# Patient Record
Sex: FEMALE | Race: WHITE | NOT HISPANIC OR LATINO
[De-identification: names, ages, dates, MRNs, and addresses within clinical notes are randomized per-mention and may not be internally consistent; named-entity substitution may affect disease eponyms.]

---

## 2017-02-27 ENCOUNTER — APPOINTMENT (OUTPATIENT)
Age: 47
End: 2017-02-27

## 2017-02-27 VITALS
SYSTOLIC BLOOD PRESSURE: 140 MMHG | DIASTOLIC BLOOD PRESSURE: 94 MMHG | RESPIRATION RATE: 14 BRPM | TEMPERATURE: 97.3 F | BODY MASS INDEX: 38.61 KG/M2 | HEIGHT: 67 IN | HEART RATE: 60 BPM | WEIGHT: 246 LBS

## 2017-02-27 LAB
ALBUMIN SERPL ELPH-MCNC: 3.2 G/DL
ALP BLD-CCNC: 36 U/L
ALT SERPL-CCNC: 17 U/L
ANION GAP SERPL CALC-SCNC: 17 MMOL/L
AST SERPL-CCNC: 20 U/L
BASOPHILS # BLD AUTO: 0.03 K/UL
BASOPHILS NFR BLD AUTO: 0.4 %
BILIRUB SERPL-MCNC: 0.4 MG/DL
BUN SERPL-MCNC: 26 MG/DL
CALCIUM SERPL-MCNC: 9.4 MG/DL
CHLORIDE SERPL-SCNC: 105 MMOL/L
CO2 SERPL-SCNC: 20 MMOL/L
CREAT SERPL-MCNC: 1.7 MG/DL
EOSINOPHIL # BLD AUTO: 0.45 K/UL
EOSINOPHIL NFR BLD AUTO: 5.5 %
HCT VFR BLD CALC: 38.5 %
HGB BLD-MCNC: 12.3 G/DL
IMM GRANULOCYTES NFR BLD AUTO: 0.2 %
LYMPHOCYTES # BLD AUTO: 2.22 K/UL
LYMPHOCYTES NFR BLD AUTO: 27 %
MAN DIFF?: NORMAL
MCHC RBC-ENTMCNC: 29.9 PG
MCHC RBC-ENTMCNC: 31.9 GM/DL
MCV RBC AUTO: 93.7 FL
MONOCYTES # BLD AUTO: 0.5 K/UL
MONOCYTES NFR BLD AUTO: 6.1 %
NEUTROPHILS # BLD AUTO: 5 K/UL
NEUTROPHILS NFR BLD AUTO: 60.8 %
PLATELET # BLD AUTO: 274 K/UL
POTASSIUM SERPL-SCNC: 4.6 MMOL/L
PROT SERPL-MCNC: 6 G/DL
RBC # BLD: 4.11 M/UL
RBC # FLD: 13.4 %
SODIUM SERPL-SCNC: 142 MMOL/L
WBC # FLD AUTO: 8.22 K/UL

## 2017-02-28 LAB
AFP-TM SERPL-MCNC: 2.5 NG/ML
HBV SURFACE AB SER QL: NONREACTIVE
HBV SURFACE AG SER QL: REACTIVE
HCV AB SER QL: NONREACTIVE
HCV S/CO RATIO: 0.18 S/CO

## 2017-03-01 LAB
HAV IGG+IGM SER QL: REACTIVE
HBV DNA # SERPL NAA+PROBE: NOT DETECTED
HBV E AB SER QL: POSITIVE
HBV E AG SER QL: NEGATIVE
HEPB DNA PCR LOG: NOT DETECTED LOGIU/ML

## 2017-03-03 LAB — HEV AB SER QL: NEGATIVE

## 2017-03-13 ENCOUNTER — APPOINTMENT (OUTPATIENT)
Age: 47
End: 2017-03-13

## 2017-03-15 ENCOUNTER — TRANSCRIPTION ENCOUNTER (OUTPATIENT)
Age: 47
End: 2017-03-15

## 2017-03-22 ENCOUNTER — FORM ENCOUNTER (OUTPATIENT)
Age: 47
End: 2017-03-22

## 2017-03-23 ENCOUNTER — APPOINTMENT (OUTPATIENT)
Dept: ULTRASOUND IMAGING | Facility: IMAGING CENTER | Age: 47
End: 2017-03-23

## 2017-03-23 ENCOUNTER — OUTPATIENT (OUTPATIENT)
Dept: OUTPATIENT SERVICES | Facility: HOSPITAL | Age: 47
LOS: 1 days | End: 2017-03-23
Payer: COMMERCIAL

## 2017-03-23 DIAGNOSIS — B18.1 CHRONIC VIRAL HEPATITIS B WITHOUT DELTA-AGENT: ICD-10-CM

## 2017-03-23 PROCEDURE — 76700 US EXAM ABDOM COMPLETE: CPT

## 2017-04-19 ENCOUNTER — APPOINTMENT (OUTPATIENT)
Dept: RADIOLOGY | Facility: IMAGING CENTER | Age: 47
End: 2017-04-19

## 2017-04-19 ENCOUNTER — OUTPATIENT (OUTPATIENT)
Dept: OUTPATIENT SERVICES | Facility: HOSPITAL | Age: 47
LOS: 1 days | End: 2017-04-19
Payer: COMMERCIAL

## 2017-04-19 ENCOUNTER — APPOINTMENT (OUTPATIENT)
Dept: MRI IMAGING | Facility: CLINIC | Age: 47
End: 2017-04-19

## 2017-04-19 DIAGNOSIS — Z00.8 ENCOUNTER FOR OTHER GENERAL EXAMINATION: ICD-10-CM

## 2017-04-19 PROCEDURE — 73721 MRI JNT OF LWR EXTRE W/O DYE: CPT

## 2017-04-19 PROCEDURE — 77080 DXA BONE DENSITY AXIAL: CPT

## 2017-06-10 ENCOUNTER — EMERGENCY (EMERGENCY)
Facility: HOSPITAL | Age: 47
LOS: 1 days | Discharge: LEFT BEFORE DISCHARGE | End: 2017-06-10
Attending: EMERGENCY MEDICINE | Admitting: EMERGENCY MEDICINE
Payer: SELF-PAY

## 2017-06-10 VITALS
RESPIRATION RATE: 17 BRPM | DIASTOLIC BLOOD PRESSURE: 87 MMHG | HEART RATE: 76 BPM | SYSTOLIC BLOOD PRESSURE: 147 MMHG | OXYGEN SATURATION: 100 % | TEMPERATURE: 99 F

## 2017-06-10 VITALS
HEART RATE: 70 BPM | SYSTOLIC BLOOD PRESSURE: 171 MMHG | DIASTOLIC BLOOD PRESSURE: 94 MMHG | TEMPERATURE: 98 F | OXYGEN SATURATION: 100 % | RESPIRATION RATE: 20 BRPM

## 2017-06-10 DIAGNOSIS — Z91.040 LATEX ALLERGY STATUS: ICD-10-CM

## 2017-06-10 DIAGNOSIS — R51 HEADACHE: ICD-10-CM

## 2017-06-10 DIAGNOSIS — I10 ESSENTIAL (PRIMARY) HYPERTENSION: ICD-10-CM

## 2017-06-10 DIAGNOSIS — K21.9 GASTRO-ESOPHAGEAL REFLUX DISEASE WITHOUT ESOPHAGITIS: ICD-10-CM

## 2017-06-10 DIAGNOSIS — Z88.0 ALLERGY STATUS TO PENICILLIN: ICD-10-CM

## 2017-06-10 DIAGNOSIS — Z79.899 OTHER LONG TERM (CURRENT) DRUG THERAPY: ICD-10-CM

## 2017-06-10 DIAGNOSIS — R06.02 SHORTNESS OF BREATH: ICD-10-CM

## 2017-06-10 DIAGNOSIS — Z98.890 OTHER SPECIFIED POSTPROCEDURAL STATES: ICD-10-CM

## 2017-06-10 LAB
ALBUMIN SERPL ELPH-MCNC: 3 G/DL — LOW (ref 3.3–5)
ALP SERPL-CCNC: 42 U/L — SIGNIFICANT CHANGE UP (ref 40–120)
ALT FLD-CCNC: 20 U/L RC — SIGNIFICANT CHANGE UP (ref 10–45)
ANION GAP SERPL CALC-SCNC: 14 MMOL/L — SIGNIFICANT CHANGE UP (ref 5–17)
APPEARANCE UR: ABNORMAL
AST SERPL-CCNC: 12 U/L — SIGNIFICANT CHANGE UP (ref 10–40)
BACTERIA # UR AUTO: ABNORMAL /HPF
BASOPHILS # BLD AUTO: 0.1 K/UL — SIGNIFICANT CHANGE UP (ref 0–0.2)
BASOPHILS NFR BLD AUTO: 0.6 % — SIGNIFICANT CHANGE UP (ref 0–2)
BILIRUB SERPL-MCNC: 0.1 MG/DL — LOW (ref 0.2–1.2)
BILIRUB UR-MCNC: NEGATIVE — SIGNIFICANT CHANGE UP
BUN SERPL-MCNC: 36 MG/DL — HIGH (ref 7–23)
CALCIUM SERPL-MCNC: 8.8 MG/DL — SIGNIFICANT CHANGE UP (ref 8.4–10.5)
CHLORIDE SERPL-SCNC: 106 MMOL/L — SIGNIFICANT CHANGE UP (ref 96–108)
CO2 SERPL-SCNC: 23 MMOL/L — SIGNIFICANT CHANGE UP (ref 22–31)
COLOR SPEC: SIGNIFICANT CHANGE UP
CREAT SERPL-MCNC: 2.1 MG/DL — HIGH (ref 0.5–1.3)
DIFF PNL FLD: ABNORMAL
EOSINOPHIL # BLD AUTO: 0.5 K/UL — SIGNIFICANT CHANGE UP (ref 0–0.5)
EOSINOPHIL NFR BLD AUTO: 4.7 % — SIGNIFICANT CHANGE UP (ref 0–6)
EPI CELLS # UR: SIGNIFICANT CHANGE UP /HPF
GLUCOSE SERPL-MCNC: 90 MG/DL — SIGNIFICANT CHANGE UP (ref 70–99)
GLUCOSE UR QL: NEGATIVE — SIGNIFICANT CHANGE UP
HCT VFR BLD CALC: 34.1 % — LOW (ref 34.5–45)
HGB BLD-MCNC: 11.7 G/DL — SIGNIFICANT CHANGE UP (ref 11.5–15.5)
KETONES UR-MCNC: NEGATIVE — SIGNIFICANT CHANGE UP
LEUKOCYTE ESTERASE UR-ACNC: NEGATIVE — SIGNIFICANT CHANGE UP
LYMPHOCYTES # BLD AUTO: 28.6 % — SIGNIFICANT CHANGE UP (ref 13–44)
LYMPHOCYTES # BLD AUTO: 3 K/UL — SIGNIFICANT CHANGE UP (ref 1–3.3)
MCHC RBC-ENTMCNC: 31.8 PG — SIGNIFICANT CHANGE UP (ref 27–34)
MCHC RBC-ENTMCNC: 34.3 GM/DL — SIGNIFICANT CHANGE UP (ref 32–36)
MCV RBC AUTO: 92.7 FL — SIGNIFICANT CHANGE UP (ref 80–100)
MONOCYTES # BLD AUTO: 0.8 K/UL — SIGNIFICANT CHANGE UP (ref 0–0.9)
MONOCYTES NFR BLD AUTO: 7.4 % — SIGNIFICANT CHANGE UP (ref 2–14)
NEUTROPHILS # BLD AUTO: 6.3 K/UL — SIGNIFICANT CHANGE UP (ref 1.8–7.4)
NEUTROPHILS NFR BLD AUTO: 58.7 % — SIGNIFICANT CHANGE UP (ref 43–77)
NITRITE UR-MCNC: NEGATIVE — SIGNIFICANT CHANGE UP
PH UR: 6 — SIGNIFICANT CHANGE UP (ref 5–8)
PLATELET # BLD AUTO: 287 K/UL — SIGNIFICANT CHANGE UP (ref 150–400)
POTASSIUM SERPL-MCNC: 3.9 MMOL/L — SIGNIFICANT CHANGE UP (ref 3.5–5.3)
POTASSIUM SERPL-SCNC: 3.9 MMOL/L — SIGNIFICANT CHANGE UP (ref 3.5–5.3)
PROT SERPL-MCNC: 5.5 G/DL — LOW (ref 6–8.3)
PROT UR-MCNC: >600 MG/DL
RBC # BLD: 3.68 M/UL — LOW (ref 3.8–5.2)
RBC # FLD: 11.2 % — SIGNIFICANT CHANGE UP (ref 10.3–14.5)
RBC CASTS # UR COMP ASSIST: ABNORMAL /HPF (ref 0–2)
SODIUM SERPL-SCNC: 143 MMOL/L — SIGNIFICANT CHANGE UP (ref 135–145)
SP GR SPEC: 1.01 — SIGNIFICANT CHANGE UP (ref 1.01–1.02)
UROBILINOGEN FLD QL: NEGATIVE — SIGNIFICANT CHANGE UP
WBC # BLD: 10.7 K/UL — HIGH (ref 3.8–10.5)
WBC # FLD AUTO: 10.7 K/UL — HIGH (ref 3.8–10.5)
WBC UR QL: ABNORMAL /HPF (ref 0–5)

## 2017-06-10 PROCEDURE — 81001 URINALYSIS AUTO W/SCOPE: CPT

## 2017-06-10 PROCEDURE — 85027 COMPLETE CBC AUTOMATED: CPT

## 2017-06-10 PROCEDURE — 80053 COMPREHEN METABOLIC PANEL: CPT

## 2017-06-10 PROCEDURE — 93005 ELECTROCARDIOGRAM TRACING: CPT

## 2017-06-10 PROCEDURE — 99284 EMERGENCY DEPT VISIT MOD MDM: CPT | Mod: 25

## 2017-06-10 PROCEDURE — 93010 ELECTROCARDIOGRAM REPORT: CPT

## 2017-06-10 PROCEDURE — 99283 EMERGENCY DEPT VISIT LOW MDM: CPT | Mod: 25

## 2017-06-10 NOTE — ED ADULT NURSE REASSESSMENT NOTE - NS ED NURSE REASSESS COMMENT FT1
Pt states that she feels as if her headache is fully gone, she is ambulating to the bathroom with a steady gait.
Pts VS stable pt denies any complaints at this time. Pt signed out MD OZIEL discussed the risks associated with leave, spouse at bedside.

## 2017-06-10 NOTE — ED PROVIDER NOTE - MEDICAL DECISION MAKING DETAILS
46 y old f morbidly obese with history of hypertension ,Nephrotic syndrome came in with headache ,SOB and headache since yesterday ,Will obtain blood work ecg ct scan othe head and on reevaluation: ZR

## 2017-06-10 NOTE — ED ADULT NURSE NOTE - OBJECTIVE STATEMENT
Pt is a 45 yo female with the co HTN at home associated with headaches x 2 days. Pt states that she took an extra dose of her BP medication and drank lemon use hoping the BP would come down. Pt states her BP at home was 180/100s, she is concerned because she is supposed to be flying Pt is a 45 yo female with the co HTN at home associated with headaches x 2 days. Pt states that she took an extra dose of her BP medication and drank lemon use hoping the BP would come down. Pt reports she feels like she is having trouble taking a full breath. She denies any CP. Pt states her BP at home was 180/100s, she is concerned because she is supposed to be flying on vacation tomorrow morning. Pt denies any N/V/D no cough fever or chills. Pt has a pmh of HTN and nephrotic syndrome.

## 2017-06-10 NOTE — ED PROVIDER NOTE - OBJECTIVE STATEMENT
46 y old [place] citizen speaking f hs of hypertension ,nephrotic syndrome diagnosed 7 y ago ,Sp hysterectomy for endometriosis ,and cholecystectomy came in complains of headache and elevated /100 Since yesterday ,She is flying tomorrow morning on vacation and wants to have some BP meds more when she gets ,Takes Losartan ,Lasix She also complains of SOB since yesterday ,no chest pain 46 y old [place] speaking f hs of hypertension ,nephrotic syndrome diagnosed 7 y ago ,Sp hysterectomy for endometriosis ,and cholecystectomy came in complains of headache and elevated /100 Since yesterday ,She is flying tomorrow morning on vacation and wants to have some BP meds more when she gets ,Takes Losartan ,Lasix She also complains of SOB since yesterday ,no chest pain.    Neris Oconnell, DO: Agree with above. Pt took 975 mg Tylenol at 7 PM. 50 mg in AM & 50 mg at 6 PM. Rx 50 mg q daily. Diffuse HA that came on gradually consistent with previous episodes of HA. +photophobia.

## 2017-06-10 NOTE — ED ADULT NURSE NOTE - PMH
Chronic Hepatitis B    Dyslipidemia    Endometriosis    GERD (gastroesophageal reflux disease)    History of Nephrotic Syndrome    HTN (hypertension)    Latex allergy    Membranous Glomerulonephropathy    Obesity, Class III, BMI 40-49.9 (morbid obesity)

## 2017-06-10 NOTE — ED PROVIDER NOTE - PROGRESS NOTE DETAILS
Neris Oconnell DO: Pt's BP now 139/88 & HA improved. Neris Oconnell DO: The patient is leaving against medical advice. The patient has the capacity to refuse further medical evaluation and care. I had a lengthy discussion with the patient with  at bedside in which appropriate further evaluation and treatment was offered to the patient, and they declined. The patient understands that as a consequence of this decision they may be at risk for clinical deterioration including permanent disability and death and verbalized this understanding. Pt states her most recent creatinine was 2.4, so Cr is improved & has hx of protein in urine given Nephrotic syndrome so UA will be unlikely to demonstrate evidence of end organ damage. Recent Hep B medications d/c ~1 week ago by nephrologist as cleared by GI & pt has had improved Cr since then but pt has been very stressed per . Clear return precautions were discussed.  The patient was urged to seek follow-up care with nephrologist - urged to call nephrologist for recommended HTN medication recommendations.

## 2017-06-10 NOTE — ED PROVIDER NOTE - CONSTITUTIONAL, MLM
normal... morbidly obese , awake, alert, oriented to person, place, time/situation and in no apparent distress.

## 2017-09-11 ENCOUNTER — APPOINTMENT (OUTPATIENT)
Age: 47
End: 2017-09-11
Payer: COMMERCIAL

## 2017-09-11 VITALS
SYSTOLIC BLOOD PRESSURE: 162 MMHG | TEMPERATURE: 98.5 F | HEIGHT: 67 IN | WEIGHT: 244 LBS | HEART RATE: 79 BPM | DIASTOLIC BLOOD PRESSURE: 117 MMHG | RESPIRATION RATE: 17 BRPM | BODY MASS INDEX: 38.3 KG/M2

## 2017-09-11 PROCEDURE — 99214 OFFICE O/P EST MOD 30 MIN: CPT

## 2017-09-11 RX ORDER — AZITHROMYCIN 250 MG/1
250 TABLET, FILM COATED ORAL
Qty: 6 | Refills: 0 | Status: DISCONTINUED | COMMUNITY
Start: 2016-10-27

## 2017-09-11 RX ORDER — ONDANSETRON 4 MG/1
4 TABLET, ORALLY DISINTEGRATING ORAL
Qty: 30 | Refills: 0 | Status: DISCONTINUED | COMMUNITY
Start: 2016-10-27

## 2017-09-11 RX ORDER — METHYLPREDNISOLONE 125 MG/2ML
125 INJECTION, POWDER, LYOPHILIZED, FOR SOLUTION INTRAMUSCULAR; INTRAVENOUS
Qty: 2 | Refills: 0 | Status: DISCONTINUED | COMMUNITY
Start: 2017-04-10

## 2017-09-12 ENCOUNTER — OTHER (OUTPATIENT)
Age: 47
End: 2017-09-12

## 2017-09-12 LAB
ALBUMIN SERPL ELPH-MCNC: 3.3 G/DL
ALP BLD-CCNC: 38 U/L
ALT SERPL-CCNC: 16 U/L
ANION GAP SERPL CALC-SCNC: 13 MMOL/L
AST SERPL-CCNC: 17 U/L
BILIRUB SERPL-MCNC: 0.2 MG/DL
BUN SERPL-MCNC: 32 MG/DL
CALCIUM SERPL-MCNC: 9.7 MG/DL
CHLORIDE SERPL-SCNC: 106 MMOL/L
CO2 SERPL-SCNC: 21 MMOL/L
CREAT SERPL-MCNC: 2.59 MG/DL
HBV SURFACE AB SER QL: NONREACTIVE
HBV SURFACE AG SER QL: REACTIVE
POTASSIUM SERPL-SCNC: 4 MMOL/L
PROT SERPL-MCNC: 6.2 G/DL
SODIUM SERPL-SCNC: 140 MMOL/L

## 2017-09-13 LAB
HBV DNA # SERPL NAA+PROBE: 189 IU/ML
HEPB DNA PCR LOG: 2.28 LOGIU/ML

## 2017-09-14 ENCOUNTER — OTHER (OUTPATIENT)
Age: 47
End: 2017-09-14

## 2017-09-25 ENCOUNTER — FORM ENCOUNTER (OUTPATIENT)
Age: 47
End: 2017-09-25

## 2017-09-26 ENCOUNTER — OUTPATIENT (OUTPATIENT)
Dept: OUTPATIENT SERVICES | Facility: HOSPITAL | Age: 47
LOS: 1 days | End: 2017-09-26
Payer: COMMERCIAL

## 2017-09-26 ENCOUNTER — APPOINTMENT (OUTPATIENT)
Dept: ULTRASOUND IMAGING | Facility: IMAGING CENTER | Age: 47
End: 2017-09-26
Payer: COMMERCIAL

## 2017-09-26 DIAGNOSIS — Z00.8 ENCOUNTER FOR OTHER GENERAL EXAMINATION: ICD-10-CM

## 2017-09-26 DIAGNOSIS — B18.1 CHRONIC VIRAL HEPATITIS B WITHOUT DELTA-AGENT: ICD-10-CM

## 2017-09-26 PROCEDURE — 76700 US EXAM ABDOM COMPLETE: CPT | Mod: 26

## 2017-09-26 PROCEDURE — 76700 US EXAM ABDOM COMPLETE: CPT

## 2017-10-24 ENCOUNTER — OTHER (OUTPATIENT)
Age: 47
End: 2017-10-24

## 2017-11-08 ENCOUNTER — EMERGENCY (EMERGENCY)
Facility: HOSPITAL | Age: 47
LOS: 1 days | Discharge: HOME | End: 2017-11-08
Attending: EMERGENCY MEDICINE
Payer: SELF-PAY

## 2017-11-08 VITALS
TEMPERATURE: 98 F | RESPIRATION RATE: 18 BRPM | SYSTOLIC BLOOD PRESSURE: 141 MMHG | DIASTOLIC BLOOD PRESSURE: 88 MMHG | HEART RATE: 66 BPM | OXYGEN SATURATION: 99 %

## 2017-11-08 VITALS
HEART RATE: 74 BPM | WEIGHT: 199.96 LBS | SYSTOLIC BLOOD PRESSURE: 154 MMHG | HEIGHT: 68 IN | TEMPERATURE: 98 F | RESPIRATION RATE: 20 BRPM | OXYGEN SATURATION: 99 % | DIASTOLIC BLOOD PRESSURE: 97 MMHG

## 2017-11-08 DIAGNOSIS — S83.92XA SPRAIN OF UNSPECIFIED SITE OF LEFT KNEE, INITIAL ENCOUNTER: ICD-10-CM

## 2017-11-08 DIAGNOSIS — Z90.49 ACQUIRED ABSENCE OF OTHER SPECIFIED PARTS OF DIGESTIVE TRACT: ICD-10-CM
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
## 2017-11-08 DIAGNOSIS — S39.012A STRAIN OF MUSCLE, FASCIA AND TENDON OF LOWER BACK, INITIAL ENCOUNTER: ICD-10-CM

## 2017-11-08 DIAGNOSIS — S16.1XXA STRAIN OF MUSCLE, FASCIA AND TENDON AT NECK LEVEL, INITIAL ENCOUNTER: ICD-10-CM

## 2017-11-08 DIAGNOSIS — E78.5 HYPERLIPIDEMIA, UNSPECIFIED: ICD-10-CM

## 2017-11-08 DIAGNOSIS — Z88.0 ALLERGY STATUS TO PENICILLIN: ICD-10-CM

## 2017-11-08 DIAGNOSIS — I10 ESSENTIAL (PRIMARY) HYPERTENSION: ICD-10-CM

## 2017-11-08 DIAGNOSIS — Z91.040 LATEX ALLERGY STATUS: ICD-10-CM

## 2017-11-08 DIAGNOSIS — M54.5 LOW BACK PAIN: ICD-10-CM

## 2017-11-08 PROCEDURE — 72100 X-RAY EXAM L-S SPINE 2/3 VWS: CPT | Mod: 26

## 2017-11-08 PROCEDURE — 70450 CT HEAD/BRAIN W/O DYE: CPT | Mod: 26

## 2017-11-08 PROCEDURE — 72125 CT NECK SPINE W/O DYE: CPT | Mod: 26

## 2017-11-08 PROCEDURE — 73590 X-RAY EXAM OF LOWER LEG: CPT

## 2017-11-08 PROCEDURE — 99284 EMERGENCY DEPT VISIT MOD MDM: CPT

## 2017-11-08 PROCEDURE — 72125 CT NECK SPINE W/O DYE: CPT

## 2017-11-08 PROCEDURE — 73590 X-RAY EXAM OF LOWER LEG: CPT | Mod: 26,LT

## 2017-11-08 PROCEDURE — 99284 EMERGENCY DEPT VISIT MOD MDM: CPT | Mod: 25

## 2017-11-08 PROCEDURE — 73562 X-RAY EXAM OF KNEE 3: CPT | Mod: 26,LT

## 2017-11-08 PROCEDURE — 70450 CT HEAD/BRAIN W/O DYE: CPT

## 2017-11-08 PROCEDURE — 73562 X-RAY EXAM OF KNEE 3: CPT

## 2017-11-08 PROCEDURE — 72100 X-RAY EXAM L-S SPINE 2/3 VWS: CPT

## 2017-11-08 RX ORDER — ACETAMINOPHEN 500 MG
650 TABLET ORAL ONCE
Qty: 0 | Refills: 0 | Status: COMPLETED | OUTPATIENT
Start: 2017-11-08 | End: 2017-11-08

## 2017-11-08 RX ADMIN — Medication 650 MILLIGRAM(S): at 11:00
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
## 2017-11-08 NOTE — ED PROVIDER NOTE - CARE PLAN
Principal Discharge DX:	Knee sprain  Secondary Diagnosis:	Back strain, initial encounter  Secondary Diagnosis:	Neck strain, initial encounter
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
## 2017-11-08 NOTE — ED PROVIDER NOTE - PROGRESS NOTE DETAILS
Py and family made aware of nonemergent Ct spine findings needing f/u, immobilizer placed to left knee, educated on care and f/u pt ortho for possible need for MRI.

## 2017-12-01 ENCOUNTER — EMERGENCY (EMERGENCY)
Facility: HOSPITAL | Age: 47
LOS: 1 days | Discharge: HOME | End: 2017-12-01
Attending: EMERGENCY MEDICINE | Admitting: EMERGENCY MEDICINE
Payer: COMMERCIAL

## 2017-12-01 VITALS
DIASTOLIC BLOOD PRESSURE: 98 MMHG | HEART RATE: 72 BPM | TEMPERATURE: 98 F | SYSTOLIC BLOOD PRESSURE: 165 MMHG | OXYGEN SATURATION: 99 % | RESPIRATION RATE: 22 BRPM

## 2017-12-01 VITALS
RESPIRATION RATE: 18 BRPM | HEART RATE: 66 BPM | SYSTOLIC BLOOD PRESSURE: 143 MMHG | OXYGEN SATURATION: 98 % | DIASTOLIC BLOOD PRESSURE: 88 MMHG

## 2017-12-01 LAB
ALBUMIN SERPL ELPH-MCNC: 3.3 G/DL — SIGNIFICANT CHANGE UP (ref 3.3–5)
ALP SERPL-CCNC: 41 U/L — SIGNIFICANT CHANGE UP (ref 40–120)
ALT FLD-CCNC: 17 U/L RC — SIGNIFICANT CHANGE UP (ref 10–45)
ANION GAP SERPL CALC-SCNC: 16 MMOL/L — SIGNIFICANT CHANGE UP (ref 5–17)
APPEARANCE UR: CLEAR — SIGNIFICANT CHANGE UP
AST SERPL-CCNC: 17 U/L — SIGNIFICANT CHANGE UP (ref 10–40)
BACTERIA # UR AUTO: ABNORMAL /HPF
BASE EXCESS BLDV CALC-SCNC: -5.2 MMOL/L — LOW (ref -2–2)
BASOPHILS # BLD AUTO: 0.1 K/UL — SIGNIFICANT CHANGE UP (ref 0–0.2)
BASOPHILS NFR BLD AUTO: 0.9 % — SIGNIFICANT CHANGE UP (ref 0–2)
BILIRUB SERPL-MCNC: 0.3 MG/DL — SIGNIFICANT CHANGE UP (ref 0.2–1.2)
BILIRUB UR-MCNC: NEGATIVE — SIGNIFICANT CHANGE UP
BUN SERPL-MCNC: 30 MG/DL — HIGH (ref 7–23)
CA-I SERPL-SCNC: 1.22 MMOL/L — SIGNIFICANT CHANGE UP (ref 1.12–1.3)
CALCIUM SERPL-MCNC: 9 MG/DL — SIGNIFICANT CHANGE UP (ref 8.4–10.5)
CHLORIDE BLDV-SCNC: 112 MMOL/L — HIGH (ref 96–108)
CHLORIDE SERPL-SCNC: 104 MMOL/L — SIGNIFICANT CHANGE UP (ref 96–108)
CO2 BLDV-SCNC: 24 MMOL/L — SIGNIFICANT CHANGE UP (ref 22–30)
CO2 SERPL-SCNC: 20 MMOL/L — LOW (ref 22–31)
COLOR SPEC: COLORLESS — SIGNIFICANT CHANGE UP
CREAT SERPL-MCNC: 3.01 MG/DL — HIGH (ref 0.5–1.3)
DIFF PNL FLD: ABNORMAL
EOSINOPHIL # BLD AUTO: 0.5 K/UL — SIGNIFICANT CHANGE UP (ref 0–0.5)
EOSINOPHIL NFR BLD AUTO: 5.4 % — SIGNIFICANT CHANGE UP (ref 0–6)
EPI CELLS # UR: ABNORMAL /HPF
GAS PNL BLDV: 135 MMOL/L — LOW (ref 136–145)
GAS PNL BLDV: SIGNIFICANT CHANGE UP
GAS PNL BLDV: SIGNIFICANT CHANGE UP
GLUCOSE BLDV-MCNC: 88 MG/DL — SIGNIFICANT CHANGE UP (ref 70–99)
GLUCOSE SERPL-MCNC: 84 MG/DL — SIGNIFICANT CHANGE UP (ref 70–99)
GLUCOSE UR QL: NEGATIVE — SIGNIFICANT CHANGE UP
HCO3 BLDV-SCNC: 22 MMOL/L — SIGNIFICANT CHANGE UP (ref 21–29)
HCT VFR BLD CALC: 35.4 % — SIGNIFICANT CHANGE UP (ref 34.5–45)
HCT VFR BLDA CALC: 61 % — HIGH (ref 39–50)
HGB BLD CALC-MCNC: 20 G/DL — CRITICAL HIGH (ref 11.5–15.5)
HGB BLD-MCNC: 12.2 G/DL — SIGNIFICANT CHANGE UP (ref 11.5–15.5)
KETONES UR-MCNC: NEGATIVE — SIGNIFICANT CHANGE UP
LACTATE BLDV-MCNC: 2.3 MMOL/L — HIGH (ref 0.7–2)
LEUKOCYTE ESTERASE UR-ACNC: NEGATIVE — SIGNIFICANT CHANGE UP
LYMPHOCYTES # BLD AUTO: 2 K/UL — SIGNIFICANT CHANGE UP (ref 1–3.3)
LYMPHOCYTES # BLD AUTO: 23.1 % — SIGNIFICANT CHANGE UP (ref 13–44)
MAGNESIUM SERPL-MCNC: 1.7 MG/DL — SIGNIFICANT CHANGE UP (ref 1.6–2.6)
MCHC RBC-ENTMCNC: 31.8 PG — SIGNIFICANT CHANGE UP (ref 27–34)
MCHC RBC-ENTMCNC: 34.5 GM/DL — SIGNIFICANT CHANGE UP (ref 32–36)
MCV RBC AUTO: 92.1 FL — SIGNIFICANT CHANGE UP (ref 80–100)
MONOCYTES # BLD AUTO: 0.5 K/UL — SIGNIFICANT CHANGE UP (ref 0–0.9)
MONOCYTES NFR BLD AUTO: 6.1 % — SIGNIFICANT CHANGE UP (ref 2–14)
NEUTROPHILS # BLD AUTO: 5.5 K/UL — SIGNIFICANT CHANGE UP (ref 1.8–7.4)
NEUTROPHILS NFR BLD AUTO: 64.5 % — SIGNIFICANT CHANGE UP (ref 43–77)
NITRITE UR-MCNC: NEGATIVE — SIGNIFICANT CHANGE UP
PCO2 BLDV: 51 MMHG — HIGH (ref 35–50)
PH BLDV: 7.27 — LOW (ref 7.35–7.45)
PH UR: 6.5 — SIGNIFICANT CHANGE UP (ref 5–8)
PHOSPHATE SERPL-MCNC: 3.5 MG/DL — SIGNIFICANT CHANGE UP (ref 2.5–4.5)
PLATELET # BLD AUTO: 269 K/UL — SIGNIFICANT CHANGE UP (ref 150–400)
PO2 BLDV: 30 MMHG — SIGNIFICANT CHANGE UP (ref 25–45)
POTASSIUM BLDV-SCNC: 3.4 MMOL/L — LOW (ref 3.5–5)
POTASSIUM SERPL-MCNC: 3.8 MMOL/L — SIGNIFICANT CHANGE UP (ref 3.5–5.3)
POTASSIUM SERPL-SCNC: 3.8 MMOL/L — SIGNIFICANT CHANGE UP (ref 3.5–5.3)
PROT SERPL-MCNC: 6 G/DL — SIGNIFICANT CHANGE UP (ref 6–8.3)
PROT UR-MCNC: 300 MG/DL
RBC # BLD: 3.84 M/UL — SIGNIFICANT CHANGE UP (ref 3.8–5.2)
RBC # FLD: 11.9 % — SIGNIFICANT CHANGE UP (ref 10.3–14.5)
RBC CASTS # UR COMP ASSIST: SIGNIFICANT CHANGE UP /HPF (ref 0–2)
SAO2 % BLDV: 50 % — LOW (ref 67–88)
SODIUM SERPL-SCNC: 140 MMOL/L — SIGNIFICANT CHANGE UP (ref 135–145)
SP GR SPEC: 1.01 — LOW (ref 1.01–1.02)
UROBILINOGEN FLD QL: NEGATIVE — SIGNIFICANT CHANGE UP
WBC # BLD: 8.6 K/UL — SIGNIFICANT CHANGE UP (ref 3.8–10.5)
WBC # FLD AUTO: 8.6 K/UL — SIGNIFICANT CHANGE UP (ref 3.8–10.5)
WBC UR QL: SIGNIFICANT CHANGE UP /HPF (ref 0–5)

## 2017-12-01 PROCEDURE — 96375 TX/PRO/DX INJ NEW DRUG ADDON: CPT

## 2017-12-01 PROCEDURE — 82803 BLOOD GASES ANY COMBINATION: CPT

## 2017-12-01 PROCEDURE — 71046 X-RAY EXAM CHEST 2 VIEWS: CPT

## 2017-12-01 PROCEDURE — 84100 ASSAY OF PHOSPHORUS: CPT

## 2017-12-01 PROCEDURE — 83605 ASSAY OF LACTIC ACID: CPT

## 2017-12-01 PROCEDURE — 84295 ASSAY OF SERUM SODIUM: CPT

## 2017-12-01 PROCEDURE — 99284 EMERGENCY DEPT VISIT MOD MDM: CPT | Mod: 25

## 2017-12-01 PROCEDURE — 80053 COMPREHEN METABOLIC PANEL: CPT

## 2017-12-01 PROCEDURE — 82330 ASSAY OF CALCIUM: CPT

## 2017-12-01 PROCEDURE — 93010 ELECTROCARDIOGRAM REPORT: CPT

## 2017-12-01 PROCEDURE — 84132 ASSAY OF SERUM POTASSIUM: CPT

## 2017-12-01 PROCEDURE — 85027 COMPLETE CBC AUTOMATED: CPT

## 2017-12-01 PROCEDURE — 83735 ASSAY OF MAGNESIUM: CPT

## 2017-12-01 PROCEDURE — 93005 ELECTROCARDIOGRAM TRACING: CPT

## 2017-12-01 PROCEDURE — 85014 HEMATOCRIT: CPT

## 2017-12-01 PROCEDURE — 82435 ASSAY OF BLOOD CHLORIDE: CPT

## 2017-12-01 PROCEDURE — 80197 ASSAY OF TACROLIMUS: CPT

## 2017-12-01 PROCEDURE — 71020: CPT | Mod: 26

## 2017-12-01 PROCEDURE — 81001 URINALYSIS AUTO W/SCOPE: CPT

## 2017-12-01 PROCEDURE — 96374 THER/PROPH/DIAG INJ IV PUSH: CPT

## 2017-12-01 PROCEDURE — 82947 ASSAY GLUCOSE BLOOD QUANT: CPT

## 2017-12-01 RX ORDER — METOCLOPRAMIDE HCL 10 MG
10 TABLET ORAL ONCE
Qty: 0 | Refills: 0 | Status: COMPLETED | OUTPATIENT
Start: 2017-12-01 | End: 2017-12-01

## 2017-12-01 RX ORDER — SODIUM CHLORIDE 9 MG/ML
1000 INJECTION INTRAMUSCULAR; INTRAVENOUS; SUBCUTANEOUS ONCE
Qty: 0 | Refills: 0 | Status: DISCONTINUED | OUTPATIENT
Start: 2017-12-01 | End: 2017-12-01

## 2017-12-01 RX ORDER — ACETAMINOPHEN 500 MG
1000 TABLET ORAL ONCE
Qty: 0 | Refills: 0 | Status: COMPLETED | OUTPATIENT
Start: 2017-12-01 | End: 2017-12-01

## 2017-12-01 RX ADMIN — Medication 400 MILLIGRAM(S): at 16:42

## 2017-12-01 RX ADMIN — Medication 10 MILLIGRAM(S): at 16:42

## 2017-12-01 NOTE — ED PROVIDER NOTE - PROGRESS NOTE DETAILS
per patient last Cr 3.5 and last UA with >8000 protein. otherwise labs unremakrable attempted to page Dr. Ya twice without call back at this time patient does not want to wait, and will leave AMA. The patient is leaving against medical advice. The patient has the capacity to refuse further medical evaluation and care. I had a lengthy discussion with the patient in which appropriate further evaluation and treatment was offered to the patient as well as acceptable alternative measures, and they declined. The patient understands that as a consequence of this decision they may be at risk for clinical deterioration including fluid overload, shortness of breath, worsening renal function, permanent disability and death. The patient understands and verbalized the risks of leaving without further evaulation and treatment. Clear return precautions were discussed. The patient was urged to seek follow-up care, with appropriate referrals made as needed. -Rain DO per patient last Cr 3.5 and last UA with >8000 protein. otherwise labs unremakrable attempted to page Dr. Ya twice without call back at this time patient does not want to wait and will Follow up with her doctor early next week. Will D/C -Rain DO spoke with Dr. Guzman covering for Dr. Ya, patient already left at this time, last Cr in october was 3.1, recommended checking [place] level, which is already pending, could explain HA and HTN. will have patient Follow up this week -Rain DO

## 2017-12-01 NOTE — ED PROVIDER NOTE - CARE PLAN
Principal Discharge DX:	Dizziness  Instructions for follow-up, activity and diet:	1. Return to ED for worsening, progressive or any other concerning symptoms   2. Follow up with your primary care doctor in 2-3days  Secondary Diagnosis:	Hypertension

## 2017-12-01 NOTE — ED PROVIDER NOTE - NS ED ROS FT
ROS: no CP/SOB. no cough. no fever. no n/v/d/c. no abd pain. no rash. no bleeding. no urinary complaints. no weakness. no vision changes. +HA. no neck/back pain. +extremity swelling. No change in mental status.

## 2017-12-01 NOTE — ED PROVIDER NOTE - OBJECTIVE STATEMENT
48yo F with h/o HTN and nephrotic syndrome on lasix/tacrolimus, intermittent tension HA x1-2 weeks worse last 2-3 days associated with dizziness, no vertigo. also with paresthesias in b/l hands/feet, no weakness. no syncope. no CP/SOB. +worsening LE edema. urination normal- no change in color. no abd pain. no fevers. no vision changes. HA mild not severe. concerned due to elevated BP last 2 days and usually worse when nephrotic syndrome relapses.     Nephro- [facility] ([facility])

## 2017-12-01 NOTE — ED PROVIDER NOTE - PLAN OF CARE
1. Return to ED for worsening, progressive or any other concerning symptoms   2. Follow up with your primary care doctor in 2-3days

## 2017-12-01 NOTE — ED PROVIDER NOTE - ATTENDING CONTRIBUTION TO CARE
I performed a history and physical exam of the patient and discussed their management with the resident. I reviewed the resident's note and agree with the documented findings and plan of care.     Patient is a 46 yo F w/ hx of nephrotic syndrome, HTN, hyperlipidemia here for evaluation of high blood pressure and tingling in b/l hands and feet. Patient also complains of headache, described as frontal and bitemporal pressure. No focal weakness, fevers, chest pain, shortness of breath, urinary symptoms. Patient c/o worsening lower extremity edema. Per patient, her pressures have been very high and she associates her headache with that.   VS noted  Gen. no acute distress, Non toxic   HEENT: EOMI, mmm, PERRL  Lungs: CTAB/L no C/ W /R   CVS: RRR   Abd; Soft non tender, non distended   Ext: b/l + 1 pitting edema  Skin: no rash  Neuro AAOx3 CN 2-12 intact, strength is 5/5 in UE/LE gait normal clear speech  a/p: headache, high blood pressure, tingling in hands/ feet - BP improving without intervention, nonfocal neuro exam, patient's headache improved after treatment with reglan and tylenol during my interview. Will check labs, u/a for electrolyte abnormalities.

## 2017-12-01 NOTE — ED ADULT NURSE NOTE - OBJECTIVE STATEMENT
48 y/o female presented to the ED c/o x2 days of elevated BP, pt stated at home BP was up to 201/120. pt takes home BP medication daily. took medication today. c/o nausea, dizziness, and heart palpitations. denies chest pain or SOB. on assessment pt is A&Ox3, Neuro intact, pt c/o numbness in lips and fingers, no notation of hypoxia, no blue around lips or fingers, fingers have cap refill ,  on room air with no signs of distress, S1S2, lungs CTAB, BS +, no pain on palpation to abdomen, skin is intact, +1 edema in lower extremities. pt undressed and assessed head to toe. call bell in hand and son next to bedside.

## 2017-12-01 NOTE — ED PROVIDER NOTE - MEDICAL DECISION MAKING DETAILS
could be exacerbation of nephrotic syndrome, lungs clear not acutely fluid overload. BP elevated, has tension HA x afew weeks, no acute neuro deficits, low suspicion for ICH. will tx HA. check labs and urine. ct head if any change in exam. may need admission for nephrotic syndrome depending on labs

## 2017-12-14 ENCOUNTER — OUTPATIENT (OUTPATIENT)
Dept: OUTPATIENT SERVICES | Facility: HOSPITAL | Age: 47
LOS: 1 days | End: 2017-12-14
Payer: COMMERCIAL

## 2017-12-14 ENCOUNTER — APPOINTMENT (OUTPATIENT)
Dept: ULTRASOUND IMAGING | Facility: IMAGING CENTER | Age: 47
End: 2017-12-14
Payer: COMMERCIAL

## 2017-12-14 DIAGNOSIS — Z00.8 ENCOUNTER FOR OTHER GENERAL EXAMINATION: ICD-10-CM

## 2017-12-14 PROCEDURE — 93970 EXTREMITY STUDY: CPT | Mod: 26

## 2017-12-14 PROCEDURE — 93970 EXTREMITY STUDY: CPT

## 2018-02-14 ENCOUNTER — APPOINTMENT (OUTPATIENT)
Dept: ENDOCRINOLOGY | Facility: CLINIC | Age: 48
End: 2018-02-14

## 2018-03-12 ENCOUNTER — OTHER (OUTPATIENT)
Age: 48
End: 2018-03-12

## 2018-03-12 ENCOUNTER — APPOINTMENT (OUTPATIENT)
Age: 48
End: 2018-03-12
Payer: COMMERCIAL

## 2018-03-12 VITALS
DIASTOLIC BLOOD PRESSURE: 103 MMHG | SYSTOLIC BLOOD PRESSURE: 150 MMHG | HEIGHT: 67 IN | BODY MASS INDEX: 38.14 KG/M2 | WEIGHT: 243 LBS | TEMPERATURE: 98.2 F | RESPIRATION RATE: 17 BRPM | HEART RATE: 74 BPM

## 2018-03-12 LAB
ALBUMIN SERPL ELPH-MCNC: 3.4 G/DL
ALP BLD-CCNC: 45 U/L
ALT SERPL-CCNC: 12 U/L
ANION GAP SERPL CALC-SCNC: 15 MMOL/L
AST SERPL-CCNC: 11 U/L
BASOPHILS # BLD AUTO: 0.02 K/UL
BASOPHILS NFR BLD AUTO: 0.3 %
BILIRUB SERPL-MCNC: 0.3 MG/DL
BUN SERPL-MCNC: 44 MG/DL
CALCIUM SERPL-MCNC: 9.5 MG/DL
CHLORIDE SERPL-SCNC: 107 MMOL/L
CO2 SERPL-SCNC: 17 MMOL/L
CREAT SERPL-MCNC: 4.31 MG/DL
EOSINOPHIL # BLD AUTO: 0.38 K/UL
EOSINOPHIL NFR BLD AUTO: 4.8 %
HCT VFR BLD CALC: 31.7 %
HGB BLD-MCNC: 10.4 G/DL
IMM GRANULOCYTES NFR BLD AUTO: 0.4 %
LYMPHOCYTES # BLD AUTO: 1.56 K/UL
LYMPHOCYTES NFR BLD AUTO: 19.7 %
MAN DIFF?: NORMAL
MCHC RBC-ENTMCNC: 29.7 PG
MCHC RBC-ENTMCNC: 32.8 GM/DL
MCV RBC AUTO: 90.6 FL
MONOCYTES # BLD AUTO: 0.53 K/UL
MONOCYTES NFR BLD AUTO: 6.7 %
NEUTROPHILS # BLD AUTO: 5.39 K/UL
NEUTROPHILS NFR BLD AUTO: 68.1 %
PLATELET # BLD AUTO: 264 K/UL
POTASSIUM SERPL-SCNC: 4.1 MMOL/L
PROT SERPL-MCNC: 6.2 G/DL
RBC # BLD: 3.5 M/UL
RBC # FLD: 13.7 %
SODIUM SERPL-SCNC: 139 MMOL/L
WBC # FLD AUTO: 7.91 K/UL

## 2018-03-12 PROCEDURE — 99214 OFFICE O/P EST MOD 30 MIN: CPT

## 2018-03-12 RX ORDER — TENOFOVIR ALAFENAMIDE 25 MG/1
25 TABLET ORAL
Qty: 90 | Refills: 3 | Status: DISCONTINUED | COMMUNITY
Start: 2017-06-06 | End: 2018-03-12

## 2018-03-13 LAB
AFP-TM SERPL-MCNC: 2.6 NG/ML
HBV SURFACE AB SER QL: NONREACTIVE
HBV SURFACE AG SER QL: REACTIVE

## 2018-03-14 LAB
HBV DNA # SERPL NAA+PROBE: NOT DETECTED
HEPB DNA PCR LOG: NOT DETECTED LOGIU/ML

## 2018-03-19 ENCOUNTER — FORM ENCOUNTER (OUTPATIENT)
Age: 48
End: 2018-03-19

## 2018-03-20 ENCOUNTER — APPOINTMENT (OUTPATIENT)
Dept: ULTRASOUND IMAGING | Facility: IMAGING CENTER | Age: 48
End: 2018-03-20
Payer: COMMERCIAL

## 2018-03-20 ENCOUNTER — OUTPATIENT (OUTPATIENT)
Dept: OUTPATIENT SERVICES | Facility: HOSPITAL | Age: 48
LOS: 1 days | End: 2018-03-20
Payer: COMMERCIAL

## 2018-03-20 DIAGNOSIS — B18.1 CHRONIC VIRAL HEPATITIS B WITHOUT DELTA-AGENT: ICD-10-CM

## 2018-03-20 PROCEDURE — 76700 US EXAM ABDOM COMPLETE: CPT

## 2018-03-20 PROCEDURE — 76700 US EXAM ABDOM COMPLETE: CPT | Mod: 26

## 2018-08-11 ENCOUNTER — INPATIENT (INPATIENT)
Facility: HOSPITAL | Age: 48
LOS: 2 days | Discharge: HOME | DRG: 305 | End: 2018-08-14
Attending: INTERNAL MEDICINE | Admitting: INTERNAL MEDICINE
Payer: COMMERCIAL

## 2018-08-11 VITALS
SYSTOLIC BLOOD PRESSURE: 194 MMHG | OXYGEN SATURATION: 100 % | DIASTOLIC BLOOD PRESSURE: 104 MMHG | HEART RATE: 63 BPM | WEIGHT: 229.94 LBS | RESPIRATION RATE: 18 BRPM | TEMPERATURE: 98 F

## 2018-08-11 DIAGNOSIS — I10 ESSENTIAL (PRIMARY) HYPERTENSION: ICD-10-CM

## 2018-08-11 DIAGNOSIS — R06.09 OTHER FORMS OF DYSPNEA: ICD-10-CM

## 2018-08-11 DIAGNOSIS — N18.9 CHRONIC KIDNEY DISEASE, UNSPECIFIED: ICD-10-CM

## 2018-08-11 DIAGNOSIS — B18.1 CHRONIC VIRAL HEPATITIS B WITHOUT DELTA-AGENT: ICD-10-CM

## 2018-08-11 DIAGNOSIS — N04.9 NEPHROTIC SYNDROME WITH UNSPECIFIED MORPHOLOGIC CHANGES: ICD-10-CM

## 2018-08-11 DIAGNOSIS — R07.9 CHEST PAIN, UNSPECIFIED: ICD-10-CM

## 2018-08-11 DIAGNOSIS — I16.0 HYPERTENSIVE URGENCY: ICD-10-CM

## 2018-08-11 LAB
ALBUMIN SERPL ELPH-MCNC: 3.4 G/DL — SIGNIFICANT CHANGE UP (ref 3.3–5)
ALP SERPL-CCNC: 46 U/L — SIGNIFICANT CHANGE UP (ref 40–120)
ALT FLD-CCNC: 15 U/L — SIGNIFICANT CHANGE UP (ref 10–45)
ANION GAP SERPL CALC-SCNC: 13 MMOL/L — SIGNIFICANT CHANGE UP (ref 5–17)
APPEARANCE UR: ABNORMAL
APTT BLD: 30.4 SEC — SIGNIFICANT CHANGE UP (ref 27.5–37.4)
AST SERPL-CCNC: 15 U/L — SIGNIFICANT CHANGE UP (ref 10–40)
BACTERIA # UR AUTO: ABNORMAL /HPF
BASOPHILS # BLD AUTO: 0.1 K/UL — SIGNIFICANT CHANGE UP (ref 0–0.2)
BASOPHILS NFR BLD AUTO: 0.9 % — SIGNIFICANT CHANGE UP (ref 0–2)
BILIRUB SERPL-MCNC: 0.3 MG/DL — SIGNIFICANT CHANGE UP (ref 0.2–1.2)
BILIRUB UR-MCNC: NEGATIVE — SIGNIFICANT CHANGE UP
BUN SERPL-MCNC: 61 MG/DL — HIGH (ref 7–23)
CALCIUM SERPL-MCNC: 9.1 MG/DL — SIGNIFICANT CHANGE UP (ref 8.4–10.5)
CHLORIDE SERPL-SCNC: 107 MMOL/L — SIGNIFICANT CHANGE UP (ref 96–108)
CO2 SERPL-SCNC: 21 MMOL/L — LOW (ref 22–31)
COLOR SPEC: COLORLESS — SIGNIFICANT CHANGE UP
CREAT SERPL-MCNC: 6.13 MG/DL — HIGH (ref 0.5–1.3)
DIFF PNL FLD: ABNORMAL
EOSINOPHIL # BLD AUTO: 0.5 K/UL — SIGNIFICANT CHANGE UP (ref 0–0.5)
EOSINOPHIL NFR BLD AUTO: 7.9 % — HIGH (ref 0–6)
EPI CELLS # UR: SIGNIFICANT CHANGE UP /HPF
GLUCOSE SERPL-MCNC: 93 MG/DL — SIGNIFICANT CHANGE UP (ref 70–99)
GLUCOSE UR QL: ABNORMAL
HCT VFR BLD CALC: 27.2 % — LOW (ref 34.5–45)
HGB BLD-MCNC: 9.2 G/DL — LOW (ref 11.5–15.5)
INR BLD: 0.96 RATIO — SIGNIFICANT CHANGE UP (ref 0.88–1.16)
KETONES UR-MCNC: NEGATIVE — SIGNIFICANT CHANGE UP
LEUKOCYTE ESTERASE UR-ACNC: NEGATIVE — SIGNIFICANT CHANGE UP
LYMPHOCYTES # BLD AUTO: 1.7 K/UL — SIGNIFICANT CHANGE UP (ref 1–3.3)
LYMPHOCYTES # BLD AUTO: 24.4 % — SIGNIFICANT CHANGE UP (ref 13–44)
MCHC RBC-ENTMCNC: 30.5 PG — SIGNIFICANT CHANGE UP (ref 27–34)
MCHC RBC-ENTMCNC: 34 GM/DL — SIGNIFICANT CHANGE UP (ref 32–36)
MCV RBC AUTO: 89.7 FL — SIGNIFICANT CHANGE UP (ref 80–100)
MONOCYTES # BLD AUTO: 0.5 K/UL — SIGNIFICANT CHANGE UP (ref 0–0.9)
MONOCYTES NFR BLD AUTO: 6.9 % — SIGNIFICANT CHANGE UP (ref 2–14)
NEUTROPHILS # BLD AUTO: 4.1 K/UL — SIGNIFICANT CHANGE UP (ref 1.8–7.4)
NEUTROPHILS NFR BLD AUTO: 59.9 % — SIGNIFICANT CHANGE UP (ref 43–77)
NITRITE UR-MCNC: NEGATIVE — SIGNIFICANT CHANGE UP
PH UR: 7 — SIGNIFICANT CHANGE UP (ref 5–8)
PLATELET # BLD AUTO: 221 K/UL — SIGNIFICANT CHANGE UP (ref 150–400)
POTASSIUM SERPL-MCNC: 4.7 MMOL/L — SIGNIFICANT CHANGE UP (ref 3.5–5.3)
POTASSIUM SERPL-SCNC: 4.7 MMOL/L — SIGNIFICANT CHANGE UP (ref 3.5–5.3)
PROT SERPL-MCNC: 6.4 G/DL — SIGNIFICANT CHANGE UP (ref 6–8.3)
PROT UR-MCNC: 300 MG/DL
PROTHROM AB SERPL-ACNC: 10.4 SEC — SIGNIFICANT CHANGE UP (ref 9.8–12.7)
RBC # BLD: 3.03 M/UL — LOW (ref 3.8–5.2)
RBC # FLD: 11.3 % — SIGNIFICANT CHANGE UP (ref 10.3–14.5)
RBC CASTS # UR COMP ASSIST: SIGNIFICANT CHANGE UP /HPF (ref 0–2)
SODIUM SERPL-SCNC: 141 MMOL/L — SIGNIFICANT CHANGE UP (ref 135–145)
SP GR SPEC: 1.01 — LOW (ref 1.01–1.02)
TROPONIN T, HIGH SENSITIVITY RESULT: 13 NG/L — SIGNIFICANT CHANGE UP (ref 0–51)
UROBILINOGEN FLD QL: NEGATIVE — SIGNIFICANT CHANGE UP
WBC # BLD: 6.9 K/UL — SIGNIFICANT CHANGE UP (ref 3.8–10.5)
WBC # FLD AUTO: 6.9 K/UL — SIGNIFICANT CHANGE UP (ref 3.8–10.5)
WBC UR QL: SIGNIFICANT CHANGE UP /HPF (ref 0–5)

## 2018-08-11 PROCEDURE — 70450 CT HEAD/BRAIN W/O DYE: CPT | Mod: 26

## 2018-08-11 PROCEDURE — 99285 EMERGENCY DEPT VISIT HI MDM: CPT

## 2018-08-11 PROCEDURE — 71250 CT THORAX DX C-: CPT | Mod: 26

## 2018-08-11 PROCEDURE — 74176 CT ABD & PELVIS W/O CONTRAST: CPT | Mod: 26

## 2018-08-11 PROCEDURE — 71046 X-RAY EXAM CHEST 2 VIEWS: CPT | Mod: 26

## 2018-08-11 RX ORDER — ACETAMINOPHEN 500 MG
650 TABLET ORAL ONCE
Qty: 0 | Refills: 0 | Status: COMPLETED | OUTPATIENT
Start: 2018-08-11 | End: 2018-08-11

## 2018-08-11 RX ORDER — HYDRALAZINE HCL 50 MG
10 TABLET ORAL ONCE
Qty: 0 | Refills: 0 | Status: COMPLETED | OUTPATIENT
Start: 2018-08-11 | End: 2018-08-11

## 2018-08-11 RX ORDER — ONDANSETRON 8 MG/1
4 TABLET, FILM COATED ORAL ONCE
Qty: 0 | Refills: 0 | Status: COMPLETED | OUTPATIENT
Start: 2018-08-11 | End: 2018-08-11

## 2018-08-11 RX ORDER — ASPIRIN/CALCIUM CARB/MAGNESIUM 324 MG
81 TABLET ORAL DAILY
Qty: 0 | Refills: 0 | Status: DISCONTINUED | OUTPATIENT
Start: 2018-08-11 | End: 2018-08-14

## 2018-08-11 RX ORDER — LABETALOL HCL 100 MG
300 TABLET ORAL
Qty: 0 | Refills: 0 | Status: DISCONTINUED | OUTPATIENT
Start: 2018-08-11 | End: 2018-08-11

## 2018-08-11 RX ORDER — ACETAMINOPHEN 500 MG
1000 TABLET ORAL ONCE
Qty: 0 | Refills: 0 | Status: DISCONTINUED | OUTPATIENT
Start: 2018-08-11 | End: 2018-08-11

## 2018-08-11 RX ORDER — ACETAMINOPHEN 500 MG
1000 TABLET ORAL ONCE
Qty: 0 | Refills: 0 | Status: COMPLETED | OUTPATIENT
Start: 2018-08-11 | End: 2018-08-11

## 2018-08-11 RX ORDER — HEPARIN SODIUM 5000 [USP'U]/ML
5000 INJECTION INTRAVENOUS; SUBCUTANEOUS EVERY 12 HOURS
Qty: 0 | Refills: 0 | Status: DISCONTINUED | OUTPATIENT
Start: 2018-08-11 | End: 2018-08-14

## 2018-08-11 RX ORDER — LABETALOL HCL 100 MG
300 TABLET ORAL
Qty: 0 | Refills: 0 | Status: DISCONTINUED | OUTPATIENT
Start: 2018-08-11 | End: 2018-08-14

## 2018-08-11 RX ADMIN — Medication 650 MILLIGRAM(S): at 14:24

## 2018-08-11 RX ADMIN — Medication 300 MILLIGRAM(S): at 20:41

## 2018-08-11 RX ADMIN — Medication 10 MILLIGRAM(S): at 15:00

## 2018-08-11 RX ADMIN — Medication 1000 MILLIGRAM(S): at 20:57

## 2018-08-11 RX ADMIN — Medication 400 MILLIGRAM(S): at 20:43

## 2018-08-11 RX ADMIN — ONDANSETRON 4 MILLIGRAM(S): 8 TABLET, FILM COATED ORAL at 14:24

## 2018-08-11 NOTE — H&P ADULT - ASSESSMENT
48F with hx of nephrotic syndrome Dxed with Kidney Bx in 2010 and off Immunosuppressant meds since 3 months ,Hepatitis B S/P treatment and  HTN on Labetalol 200 mg BID here for L sided chest pain since this AM, non-exertional, non-pleuritic. +nausea without vomiting. +TAMEZ yesterday with 1 block. Her BP since last week has been running quite high . No fevers, cough, shortness of breath. No recent travel, no surgery, hx of DVT/PE, no hx of CA, no exogenous hormone use. No family hx of cardiac disease at young age.

## 2018-08-11 NOTE — ED PROVIDER NOTE - PHYSICAL EXAMINATION
Gen: Well appearing, NAD  Head: NCAT  HEENT: MMM, Normal conjunctiva  Lung: CTAB, no rales, rhonchi or wheezing  CV: RRR, no murmurs, rubs or gallops. Pulses equal b/l, BP same in both arms  Abd: soft, NTND, no rebound or guarding  MSK: No CVA tenderness. 2+ pitting edema LE b/l  Neuro: No focal neurologic deficits.   Skin: Warm and dry, no evidence of rash  Psych: normal mood and affect, A&Ox3

## 2018-08-11 NOTE — ED ADULT NURSE REASSESSMENT NOTE - NS ED NURSE REASSESS COMMENT FT1
As per report from [facility], pt was not scheduled to take anymore blood pressure medications.  Admitting team came down to ask about medication labetolol that was ordered.  Pharmacy had not sent down any medications for labetolol.  pharmacy called again to get medication that is not readily available on the unit.

## 2018-08-11 NOTE — H&P ADULT - PROBLEM SELECTOR PLAN 2
R/O ACS . CT chest and Abdomen with no contrast is negative. I don't want any stress test as didn't like it in past. R/O ACS . CT chest and Abdomen with no contrast is negative. I don't want any stress test as didn't like it in past. Very low suspicion for PE as has been ambulating and going to work regularly . Will order Echocardiogram and further work up per cardiology.

## 2018-08-11 NOTE — ED ADULT NURSE NOTE - OBJECTIVE STATEMENT
48 yr old female to ed c/o l sided chest pain rad to back. Has h/o nephrotic syndrome. C/o cp, nonpleuritic or nonexertional pain. Afebrile Resp even and nonlab. Stop smoking [id number] ago Denies fever or chills C/o nausea denies vomiting

## 2018-08-11 NOTE — H&P ADULT - PROBLEM SELECTOR PLAN 6
Off Immunosuppressants since 3 months proteinuria had improved. F/OU with Dr Ya at [facility].

## 2018-08-11 NOTE — H&P ADULT - PROBLEM SELECTOR PLAN 1
Said only Labetalol was prescribed by my Nephrologist as others caused problem with Kidneys . S/P IV Hydralazine in ed . Increased dose of labetalol to 300mg bid.

## 2018-08-11 NOTE — H&P ADULT - RS GEN PE MLT RESP DETAILS PC
no chest wall tenderness/normal/airway patent/breath sounds equal/clear to auscultation bilaterally/good air movement/respirations non-labored

## 2018-08-11 NOTE — ED PROVIDER NOTE - PROGRESS NOTE DETAILS
Neris Oconnell DO: Patient complaining of severe HA with nausea, remains neuro intact. repeat pressure 192/97. Discussed with CT and CT expedited. Patient s/p hysterectomy, Upreg should not delay care. Attending Jules Aguilera DO: Pt feeling better at this time. Pt reluctant to be admitted, however agrees. Pt with sourav on ckd, CT scans unremarkable, bp improved but still remains elevated. Will admit for further management.

## 2018-08-11 NOTE — ED ADULT NURSE REASSESSMENT NOTE - NS ED NURSE REASSESS COMMENT FT1
Pt states " I feel a little better." Spoke to Dr Aguilera in regards to admission Denies chest pain or sob

## 2018-08-11 NOTE — H&P ADULT - PROBLEM SELECTOR PLAN 4
her Nephrologist planning repeat Bx but going to [facility] to attend wedding first.

## 2018-08-11 NOTE — H&P ADULT - GASTROINTESTINAL DETAILS
no bruit/no guarding/no distention/normal/no rebound tenderness/no rigidity/soft/nontender/no masses palpable/bowel sounds normal

## 2018-08-11 NOTE — ED PROVIDER NOTE - ATTENDING CONTRIBUTION TO CARE
49 yo female hx of nephrotic syndrome, HTN on labetalol presents with left sided chest and shoulder pain, radiating to the back x 1 day. pressure type pain. Also endorses exertional dyspnea yesterday. States similar symptoms in the past when her BP is high. Pain is mild, not positional, not reproducible. No fevers, no chills, no cough, no weakness/numbness/tingling. PE: well appearing, NAD, resting comfortably, lungs clear, RRR, ab soft nt/nd, 2+ pitting edema of LE b/l, no rashes. A/P: 49 yo female presents with chest pain x 1 day, exertional SOB yesterday. Concern for ACS as cause. PE unlikely. aortic dissection unlikely given nature of pain. Will obtain labs, cxr, ekg, reevaluate.

## 2018-08-11 NOTE — ED PROVIDER NOTE - OBJECTIVE STATEMENT
Neris Oconnell, DO: 48F with hx of nephrotic syndrome c/b HTN on Labetalol 100 mg BID here for L sided chest pain since this AM, non-exertional, non-pleuritic. +nausea without vomiting. +TAMEZ yesterday with 1 block. No fevers, cough, shortness of breath. No recent travel, no surgery, hx of DVT/PE, no hx of CA, no exogenous hormone use. No family hx of cardiac disease at young age. Nreis Oconnell, DO: 48F with hx of nephrotic syndrome c/b HTN on Labetalol 100 mg BID here for L sided chest pain since this AM, non-exertional, non-pleuritic. +nausea without vomiting. +TAMEZ yesterday with 1 block. No fevers, cough, shortness of breath. No recent travel, no surgery, hx of DVT/PE, no hx of CA, no exogenous hormone use. No family hx of cardiac disease at young age.    PCP - none  [facility]

## 2018-08-11 NOTE — ED ADULT NURSE REASSESSMENT NOTE - NS ED NURSE REASSESS COMMENT FT1
Pt pending bed upstairs, neuro intact, has partial relief of onset of headache.  Safety and comfort maintained. Will continue to monitor.

## 2018-08-12 LAB
ANION GAP SERPL CALC-SCNC: 13 MMOL/L — SIGNIFICANT CHANGE UP (ref 5–17)
BUN SERPL-MCNC: 58 MG/DL — HIGH (ref 7–23)
CALCIUM SERPL-MCNC: 8.6 MG/DL — SIGNIFICANT CHANGE UP (ref 8.4–10.5)
CALCIUM SERPL-MCNC: 8.9 MG/DL — SIGNIFICANT CHANGE UP (ref 8.4–10.5)
CHLORIDE SERPL-SCNC: 107 MMOL/L — SIGNIFICANT CHANGE UP (ref 96–108)
CHOLEST SERPL-MCNC: 257 MG/DL — HIGH (ref 10–199)
CO2 SERPL-SCNC: 19 MMOL/L — LOW (ref 22–31)
CREAT ?TM UR-MCNC: 51 MG/DL — SIGNIFICANT CHANGE UP
CREAT SERPL-MCNC: 5.93 MG/DL — HIGH (ref 0.5–1.3)
FERRITIN SERPL-MCNC: 152 NG/ML — HIGH (ref 15–150)
FERRITIN SERPL-MCNC: 164 NG/ML — HIGH (ref 15–150)
FOLATE SERPL-MCNC: 8.6 NG/ML — SIGNIFICANT CHANGE UP
GLUCOSE SERPL-MCNC: 89 MG/DL — SIGNIFICANT CHANGE UP (ref 70–99)
HAPTOGLOB SERPL-MCNC: 96 MG/DL — SIGNIFICANT CHANGE UP (ref 34–200)
HBA1C BLD-MCNC: 5 % — SIGNIFICANT CHANGE UP (ref 4–5.6)
HBV CORE AB SER-ACNC: REACTIVE
HBV SURFACE AB SER-ACNC: SIGNIFICANT CHANGE UP
HBV SURFACE AG SER-ACNC: REACTIVE
HCT VFR BLD CALC: 29.4 % — LOW (ref 34.5–45)
HCV AB S/CO SERPL IA: 0.15 S/CO — SIGNIFICANT CHANGE UP
HCV AB SERPL-IMP: SIGNIFICANT CHANGE UP
HDLC SERPL-MCNC: 32 MG/DL — LOW (ref 40–125)
HGB BLD-MCNC: 9.4 G/DL — LOW (ref 11.5–15.5)
IRON SATN MFR SERPL: 21 % — SIGNIFICANT CHANGE UP (ref 14–50)
IRON SATN MFR SERPL: 22 % — SIGNIFICANT CHANGE UP (ref 14–50)
IRON SATN MFR SERPL: 53 UG/DL — SIGNIFICANT CHANGE UP (ref 30–160)
IRON SATN MFR SERPL: 54 UG/DL — SIGNIFICANT CHANGE UP (ref 30–160)
LIPID PNL WITH DIRECT LDL SERPL: 170 MG/DL — HIGH
MCHC RBC-ENTMCNC: 29.1 PG — SIGNIFICANT CHANGE UP (ref 27–34)
MCHC RBC-ENTMCNC: 32.1 GM/DL — SIGNIFICANT CHANGE UP (ref 32–36)
MCV RBC AUTO: 90.7 FL — SIGNIFICANT CHANGE UP (ref 80–100)
PHOSPHATE SERPL-MCNC: 4.7 MG/DL — HIGH (ref 2.5–4.5)
PLATELET # BLD AUTO: 241 K/UL — SIGNIFICANT CHANGE UP (ref 150–400)
POTASSIUM SERPL-MCNC: 4.8 MMOL/L — SIGNIFICANT CHANGE UP (ref 3.5–5.3)
POTASSIUM SERPL-SCNC: 4.8 MMOL/L — SIGNIFICANT CHANGE UP (ref 3.5–5.3)
PROT ?TM UR-MCNC: 264 MG/DL — HIGH (ref 0–12)
PROT/CREAT UR-RTO: 5.1 RATIO — HIGH (ref 0–0.2)
PTH-INTACT FLD-MCNC: 281 PG/ML — HIGH (ref 15–65)
RBC # BLD: 3.24 M/UL — LOW (ref 3.8–5.2)
RBC # FLD: 12.2 % — SIGNIFICANT CHANGE UP (ref 10.3–14.5)
SODIUM SERPL-SCNC: 139 MMOL/L — SIGNIFICANT CHANGE UP (ref 135–145)
TIBC SERPL-MCNC: 244 UG/DL — SIGNIFICANT CHANGE UP (ref 220–430)
TIBC SERPL-MCNC: 261 UG/DL — SIGNIFICANT CHANGE UP (ref 220–430)
TOTAL CHOLESTEROL/HDL RATIO MEASUREMENT: 8 RATIO — HIGH (ref 3.3–7.1)
TRIGL SERPL-MCNC: 276 MG/DL — HIGH (ref 10–149)
TROPONIN T, HIGH SENSITIVITY RESULT: 13 NG/L — SIGNIFICANT CHANGE UP (ref 0–51)
TSH SERPL-MCNC: 7.12 UIU/ML — HIGH (ref 0.27–4.2)
TSH SERPL-MCNC: 8.2 UIU/ML — HIGH (ref 0.27–4.2)
UIBC SERPL-MCNC: 191 UG/DL — SIGNIFICANT CHANGE UP (ref 110–370)
UIBC SERPL-MCNC: 207 UG/DL — SIGNIFICANT CHANGE UP (ref 110–370)
VIT B12 SERPL-MCNC: 473 PG/ML — SIGNIFICANT CHANGE UP (ref 232–1245)
WBC # BLD: 6.9 K/UL — SIGNIFICANT CHANGE UP (ref 3.8–10.5)
WBC # FLD AUTO: 6.9 K/UL — SIGNIFICANT CHANGE UP (ref 3.8–10.5)

## 2018-08-12 PROCEDURE — 99254 IP/OBS CNSLTJ NEW/EST MOD 60: CPT

## 2018-08-12 RX ORDER — ACETAMINOPHEN 500 MG
650 TABLET ORAL ONCE
Qty: 0 | Refills: 0 | Status: COMPLETED | OUTPATIENT
Start: 2018-08-12 | End: 2018-08-12

## 2018-08-12 RX ORDER — HYDROMORPHONE HYDROCHLORIDE 2 MG/ML
0.5 INJECTION INTRAMUSCULAR; INTRAVENOUS; SUBCUTANEOUS ONCE
Qty: 0 | Refills: 0 | Status: DISCONTINUED | OUTPATIENT
Start: 2018-08-12 | End: 2018-08-12

## 2018-08-12 RX ORDER — HYDRALAZINE HCL 50 MG
10 TABLET ORAL ONCE
Qty: 0 | Refills: 0 | Status: COMPLETED | OUTPATIENT
Start: 2018-08-12 | End: 2018-08-12

## 2018-08-12 RX ORDER — METOCLOPRAMIDE HCL 10 MG
10 TABLET ORAL ONCE
Qty: 0 | Refills: 0 | Status: COMPLETED | OUTPATIENT
Start: 2018-08-12 | End: 2018-08-12

## 2018-08-12 RX ADMIN — Medication 300 MILLIGRAM(S): at 17:10

## 2018-08-12 RX ADMIN — Medication 10 MILLIGRAM(S): at 10:25

## 2018-08-12 RX ADMIN — HYDROMORPHONE HYDROCHLORIDE 0.5 MILLIGRAM(S): 2 INJECTION INTRAMUSCULAR; INTRAVENOUS; SUBCUTANEOUS at 13:10

## 2018-08-12 RX ADMIN — Medication 81 MILLIGRAM(S): at 12:01

## 2018-08-12 RX ADMIN — Medication 650 MILLIGRAM(S): at 08:40

## 2018-08-12 RX ADMIN — Medication 300 MILLIGRAM(S): at 06:11

## 2018-08-12 RX ADMIN — HYDROMORPHONE HYDROCHLORIDE 0.5 MILLIGRAM(S): 2 INJECTION INTRAMUSCULAR; INTRAVENOUS; SUBCUTANEOUS at 12:01

## 2018-08-12 RX ADMIN — Medication 650 MILLIGRAM(S): at 07:37

## 2018-08-12 NOTE — CONSULT NOTE ADULT - ASSESSMENT
48 year old woman with PMH of Nephrotic Syndrome , HBV, HTN presenting with chest pain, headacghe and nausea in the setting of hypertensive urgency    1. Chest Pain  -Symptoms have resolved with improved BP control  -recommend stress test which the pt has refused citing her symptoms have resolved which she attributes to her BP  -ECHO    2. HTN  -BP improved  -cont current meds    3. Nephrotic Syndrome  -Cr rising, ANEL  -renal follow up 48 year old woman with PMH of Nephrotic Syndrome , HBV, HTN presenting with chest pain, headacghe and nausea in the setting of hypertensive urgency    1. Chest Pain  -Symptoms have resolved with improved BP control  -No evidence of ACS  -recommend stress test which the pt has refused citing her symptoms have resolved which she attributes to her BP  -ECHO    2. HTN  -BP improved  -cont current meds    3. Nephrotic Syndrome  -Cr rising, ANEL  -renal follow up

## 2018-08-12 NOTE — CONSULT NOTE ADULT - SUBJECTIVE AND OBJECTIVE BOX
HPI:  48F with hx of nephrotic syndrome ,Hepatitis B S/P treatment and  HTN on Labetalol 200 mg BID here for L sided chest pain since this AM, non-exertional, non-pleuritic. +nausea without vomiting. +TAMEZ yesterday with 1 block. Her BP since last week has been running quite high . No fevers, cough, shortness of breath. No recent travel, no surgery, hx of DVT/PE, no hx of CA, no exogenous hormone use. No family hx of cardiac disease at young age. (11 Aug 2018 19:23)  Nephrology consulted for furher management.   The patient has hx of membranous disease diagnosed on a kidney biopsy in . She was treated with steroids and CNI, that was not effective so she was treated with Rituximab. Last dose was about a year ago. She also has hx of Hep B and treated successfully a year ago. She was Dr. Price who requested a repeat bipsy next month because of worsening kidney function. According to the patient there is no connection between the Hep B and the membranous disease and she also said that kidney function has been getting worse since she took the Rituximab. No urinary sx, no LE edema or SOb at this time. She does not take NSAIDs.      PAST MEDICAL & SURGICAL HISTORY:  GERD (gastroesophageal reflux disease)  Latex allergy  Obesity, Class III, BMI 40-49.9 (morbid obesity)  Endometriosis  HTN (hypertension)  Dyslipidemia  Membranous Glomerulonephropathy  Chronic Hepatitis B  History of Nephrotic Syndrome  Hx of tonsillectomy: as a child  Hx of cholecystectomy  H/O  section x2: ,       MEDICATIONS  (STANDING):  aspirin enteric coated 81 milliGRAM(s) Oral daily  heparin  Injectable 5000 Unit(s) SubCutaneous every 12 hours  HYDROmorphone  Injectable 0.5 milliGRAM(s) IV Push once  labetalol 300 milliGRAM(s) Oral two times a day      Allergies    latex (Unknown)  penicillin (Unknown)    Intolerances        SOCIAL HISTORY:  Denies ETOh,Smoking,     FAMILY HISTORY:  No pertinent family history in first degree relatives      REVIEW OF SYSTEMS:    As per HPI  All other review of systems is negative unless indicated above.    VITAL:  T(C): , Max: 37.1 (18 @ 06:09)  T(F): , Max: 98.8 (18 @ 11:40)  HR: 89 (18 @ 11:40)  BP: 123/79 (18 @ 11:40)  BP(mean): --  RR: 17 (18 @ 11:40)  SpO2: 96% (18 @ 11:40)  Wt(kg): --    I and O's:    Height (cm): 172.72 ( @ 21:19)  Weight (kg): 104.3 ( @ 12:22)  BMI (kg/m2): 35 ( @ 21:19)  BSA (m2): 2.17 ( @ 21:19)    PHYSICAL EXAM:    Constitutional: NAD  HEENT: PERRLA, EOMI,  MMM  Neck: No LAD, No JVD  Respiratory: CTAB  Cardiovascular: S1 and S2  Gastrointestinal: BS+, soft, NT/ND  Extremities: No peripheral edema  Neurological: A/O x 3, no focal deficits  Psychiatric: Normal mood, normal affect  : No Jay  Skin: No rashes  Access: Not applicable    LABS:                        9.4    6.9   )-----------( 241      ( 12 Aug 2018 07:10 )             29.4     08-12    139  |  107  |  58<H>  ----------------------------<  89  4.8   |  19<L>  |  5.93<H>    Ca    8.9      12 Aug 2018 07:08  Mg     1.9     -    TPro  6.4  /  Alb  3.4  /  TBili  0.3  /  DBili  x   /  AST  15  /  ALT  15  /  AlkPhos  46  08-11      Urine Studies:  Urinalysis Basic - ( 11 Aug 2018 18:10 )    Color: Colorless / Appearance: SL Turbid / S.008 / pH: x  Gluc: x / Ketone: Negative  / Bili: Negative / Urobili: Negative   Blood: x / Protein: 300 mg/dL / Nitrite: Negative   Leuk Esterase: Negative / RBC: 3-5 /HPF / WBC 0-2 /HPF   Sq Epi: x / Non Sq Epi: OCC /HPF / Bacteria: Few /HPF            RADIOLOGY & ADDITIONAL STUDIES:        ASSESSMENT:  HPI:  48F with hx of membranous disease diagnosed on a kidney biopsy in . She was treated with steroids and CNI, that was not effective so she was treated with Rituximab. Last dose was about a year ago. She also has hx of Hep B and treated successfully a year ago. She was Dr. Price who requested a repeat bipsy next month because of worsening kidney function. According to the patient there is no connection between the Hep B and the membranous disease and she also said that kidney function has been getting worse since she took the Rituximab.     - CKD Stage III/IV with baseline serum creatinine of  1.5 mg/dl likely dt renovascular disease/membranous nephropathy although less likely    - ANEL DD: pre-renal 2ry to renal vasospasm dt uncontrolled HTN vs ?renal venin thrombosis- UA showed protein, no RBC or WBC  - Hypertension uncontrolled  - Volume status- euvoelmic  - Electrolytes- controlled  - Anemia      PLAN:   - Hep B and C serologies, GUME, SPEP and IF  - HGBA1C  - Urine Protein, Urine Creatinine  - defer diuretics and IVF  - Patient refusing to get a biopsy done at this time because she has a trip to [facility] in 10 days. She wants to follow with Dr. Price when she comes back  - Iron studies  - Phosphorus, PTH, 25OH vitamin D  - Renal duplex  - Add hydralazine 10 mg po TID if SBP > 140  - Renal dosing of meds to creatinine clearance of 10-15ml/min  - Avoid nephrotoxins as able  - No indication for renal replacement therapy at this time      Thank you for the courtesy of the referral    Lalit Huggins MD  [facility]   [phone]

## 2018-08-12 NOTE — CONSULT NOTE ADULT - ATTENDING COMMENTS
Patient seen and examined. currently headache 0/10. /89. Headache over last 2 days, no other neurological symptom except nausea. no visual disturbances. BP running high since last week. neuro exam non focal.  adamantly refuses brain MRI/MRV. refuses spinal tap. discussed various differentials and possible neurological side effects if not treated such as visual loss, stroke etc. She will think about it, but declines any further work up for now.

## 2018-08-12 NOTE — PROVIDER CONTACT NOTE (CRITICAL VALUE NOTIFICATION) - SITUATION
MYAH Pt received critical lab value from [employer]  Natalie Ocasio. Critical lab value: Positive Hep B surface antigen.   Rebel Roland NP notified.

## 2018-08-12 NOTE — CONSULT NOTE ADULT - ASSESSMENT
48F with hx of nephrotic syndrome Dxed with Kidney Bx in 2010 and off Immunosuppressant meds since 3 months ,Hepatitis B S/P treatment and  HTN on Labetalol 200 mg BID here for L sided chest pain since this AM, non-exertional, non-pleuritic. +nausea without vomiting. +TAMEZ yesterday with 1 block. Her BP since last week has been running quite high . No fevers, cough, shortness of breath. No recent travel, no surgery, hx of DVT/PE, no hx of CA, no exogenous hormone use. No family hx of cardiac disease at young age.  Neurology consulted for headaches and CT findings for possible pseudotumor cerebri  As per patient, she has had headaches in the past when her blood pressure has been elevated. Currently, reports her headache on admission was described as intermittent "pressure-like" 8/10 in severity without radiation. Patient admitted to associated nausea but no vomiting. Denies any pain with extraocular movements, numbness, tingling, unilateral weakness, visual changes, speech changes. Patient tried tylenol without relief. Patient currently received Dilaudid for pain control and now rates the headache 2/10.   Denies any history of vision loss. Neurological exam non-focal.     Impression: Tension headaches associated with elevated blood pressure vs less likely Idiopathic Intracranial Hypertension    Recommendations:  [] Optimize BP control   [] If Pseudotumor Cerebri suspected, patient would need Lumbar Puncture (patient, however, is refusing the procedure)  [] Rest Management per Primary Team    Plan to be discussed with Neurology Attending, further recs will be added if required. 48F with hx of nephrotic syndrome Dxed with Kidney Bx in 2010 and off Immunosuppressant meds since 3 months ,Hepatitis B S/P treatment and  HTN on Labetalol 200 mg BID here for L sided chest pain since this AM, non-exertional, non-pleuritic. +nausea without vomiting. +TAMEZ yesterday with 1 block. Her BP since last week has been running quite high . No fevers, cough, shortness of breath. No recent travel, no surgery, hx of DVT/PE, no hx of CA, no exogenous hormone use. No family hx of cardiac disease at young age.  Neurology consulted for headaches and CT findings for possible pseudotumor cerebri  As per patient, she has had headaches in the past when her blood pressure has been elevated. Currently, reports her headache on admission was described as intermittent "pressure-like" 8/10 in severity without radiation. Patient admitted to associated nausea but no vomiting. Denies any pain with extraocular movements, numbness, tingling, unilateral weakness, visual changes, speech changes. Patient tried tylenol without relief. Patient currently received Dilaudid for pain control and now rates the headache 2/10.   Denies any history of vision loss. Neurological exam non-focal.     Impression: Headaches associated with elevated blood pressure vs less likely Idiopathic Intracranial Hypertension    Recommendations:  [] Optimize BP control   [] If Pseudotumor Cerebri suspected, patient would need Lumbar Puncture (patient, however, is refusing the procedure)  [] Rest Management per Primary Team    Plan to be discussed with Neurology Attending, further recs will be added if required. 48F with hx of nephrotic syndrome Dxed with Kidney Bx in 2010 and off Immunosuppressant meds since 3 months ,Hepatitis B S/P treatment and  HTN on Labetalol 200 mg BID here for L sided chest pain since this AM, non-exertional, non-pleuritic. +nausea without vomiting. +TAMEZ yesterday with 1 block. Her BP since last week has been running quite high . No fevers, cough, shortness of breath. No recent travel, no surgery, hx of DVT/PE, no hx of CA, no exogenous hormone use. No family hx of cardiac disease at young age.  Neurology consulted for headaches and CT findings for possible pseudotumor cerebri  As per patient, she has had headaches in the past when her blood pressure has been elevated. Currently, reports her headache on admission was described as intermittent "pressure-like" 8/10 in severity without radiation. Patient admitted to associated nausea but no vomiting. Denies any pain with extraocular movements, numbness, tingling, unilateral weakness, visual changes, speech changes. Patient tried tylenol without relief. Patient currently received Dilaudid for pain control and now rates the headache 2/10.   Denies any history of vision loss. Neurological exam non-focal.     Impression: Headaches associated with elevated blood pressure vs less likely Idiopathic Intracranial Hypertension    Recommendations:  [] Optimize BP control   [] MRI brain w/o contrast  [] MRV  [] Ophthalmology consult for dilated eye exam  [] If Pseudotumor Cerebri suspected, patient would need Lumbar Puncture (patient, however, is refusing the procedure)  [] Rest Management per Primary Team  [] Neurology to follow    Plan discussed with Neurology Attending.

## 2018-08-12 NOTE — CONSULT NOTE ADULT - SUBJECTIVE AND OBJECTIVE BOX
ROSEMARY GUSTAFSONFJSHWZD05lWvzyaiDylrjij is a 48y old  Female who presents with a chief complaint of Uncontrolled BP and chest pain (11 Aug 2018 19:23)      HPI:  "48F with hx of nephrotic syndrome Dxed with Kidney Bx in  and off Immunosuppressant meds since 3 months ,Hepatitis B S/P treatment and  HTN on Labetalol 200 mg BID here for L sided chest pain since this AM, non-exertional, non-pleuritic. +nausea without vomiting. +TAMEZ yesterday with 1 block. Her BP since last week has been running quite high . No fevers, cough, shortness of breath. No recent travel, no surgery, hx of DVT/PE, no hx of CA, no exogenous hormone use. No family hx of cardiac disease at young age. (11 Aug 2018 19:23)"    Neurology consulted for headaches and CT findings for possible pseudotumor cerebri    As per patient, she has had headaches in the past when her blood pressure has been elevated. Currently, reports her headache on admission was described as intermittent "pressure-like" 8/10 in severity without radiation. Patient admitted to associated nausea but no vomiting. Denies any pain with extraocular movements, numbness, tingling, unilateral weakness, visual changes, speech changes. Patient tried tylenol without relief. Patient currently received Dilaudid for pain control and now rates the headache 2/10.   Denies any history of vision loss.       MEDICATIONS  (STANDING):  aspirin enteric coated 81 milliGRAM(s) Oral daily  heparin  Injectable 5000 Unit(s) SubCutaneous every 12 hours  labetalol 300 milliGRAM(s) Oral two times a day    MEDICATIONS  (PRN):    PAST MEDICAL & SURGICAL HISTORY:  GERD (gastroesophageal reflux disease)  Latex allergy  Obesity, Class III, BMI 40-49.9 (morbid obesity)  Endometriosis  HTN (hypertension)  Dyslipidemia  Membranous Glomerulonephropathy  Chronic Hepatitis B  History of Nephrotic Syndrome  Hx of tonsillectomy: as a child  Hx of cholecystectomy  H/O  section x2: ,     FAMILY HISTORY:  No pertinent family history in first degree relatives    Allergies    latex (Unknown)  penicillin (Unknown)    Intolerances        SHx - No smoking, No ETOH, No drug abuse      Review of Systems:  As per HPI      Vital Signs Last 24 Hrs  T(C): 37.1 (12 Aug 2018 11:40), Max: 37.1 (12 Aug 2018 06:09)  T(F): 98.8 (12 Aug 2018 11:40), Max: 98.8 (12 Aug 2018 11:40)  HR: 89 (12 Aug 2018 11:40) (56 - 89)  BP: 123/79 (12 Aug 2018 11:40) (123/79 - 190/92)  BP(mean): --  RR: 17 (12 Aug 2018 11:40) (16 - 18)  SpO2: 96% (12 Aug 2018 11:40) (95% - 100%)    General Exam:   General appearance: No acute distress    Eyes: Pupils 2mm bilaterally. No blurring of optic disc margins on fundoscopic exam.               Neurological Exam:  Mental Status: Orientated to self, date and place.  Attention intact.  No dysarthria. Speech fluent.  Cranial Nerves:   PERRL, EOMI, VFF, no nystagmus.    CN V1-3 intact to light touch .  No facial asymmetry.  Hearing intact to finger rub bilaterally.  Tongue, uvula and palate midline.  Sternocleidomastoid and Trapezius intact bilaterally.    Motor:   Tone: normal.                  Strength:     [] Upper extremity                      Delt       Bicep    Tricep                                                  R         5/5        5/5        5/5       5/5                                               L          5/5        5/5        5/5       5/5  [] Lower extremity                       HF          KE          KF        DF         PF                                               R        5/5        5/5        5/5       5/5       5/5                                               L         5/5        5/5       5/5       5/5        5/5  Pronator drift: none                 Dysmetria: None to finger-nose-finger.    Tremor: No resting, postural or action tremor.  No myoclonus.    Sensation: intact to light touch.     Deep Tendon Reflexes:     Biceps          Triceps      BR        Patellar        Ankle         Babinski                                         R       2+                   2+           2+            2+               2+           downgoing                                         L        2+                  2+           2+            2+               2+           downgoing    Gait: Deferred by patient (felt "slow" after receiving dilaudid)    Other:        139  |  107  |  58<H>  ----------------------------<  89  4.8   |  19<L>  |  5.93<H>    Ca    8.9      12 Aug 2018 07:08  Mg     1.9         TPro  6.4  /  Alb  3.4  /  TBili  0.3  /  DBili  x   /  AST  15  /  ALT  15  /  AlkPhos  46                              9.4    6.9   )-----------( 241      ( 12 Aug 2018 07:10 )             29.4       Radiology    CT: < from: CT Head No Cont (18 @ 16:02) >    No change from 2017, there are few nonspecific foci of white matter   decreased attenuation, likely minimal microvascular disease or sequela of   migraines. There is no acute hemorrhage or midline shift.    Partially empty sella with slightly prominent optic nerve sheaths, this   can be associated with pseudotumor cerebri. If symptoms persist consider   follow up CT or MRI if no contraindications.      < end of copied text >

## 2018-08-12 NOTE — CONSULT NOTE ADULT - SUBJECTIVE AND OBJECTIVE BOX
[facility] Ophthalmology Consult Note    HPI: 49 y/o F PMH nephrotic syndrome, hep B s/p treatment, HTN, obesity presenting with left sided chest pain and headache x 2 days. Given headache and CT findings concerning for pseudotumor, neurology recommended ophthalmology consult.    Patient denies eye pain, visual changes, diplopia, flashes/floaters, photophobia, redness, and irritation. She wears contact lenses. Her headache has improved after administration of percoset.       PMH: as above  POcHx (including surgeries/lasers/trauma):  None  Drops: None  FamHx: None  Social Hx: None  Allergies: latex, penicillin    ROS:  General (neg), Vision (per HPI), Head and Neck (neg), Pulm (neg), CV (neg), GI (neg),  (neg), Musculoskeletal (neg), Skin/Integ (neg), Neuro (neg), Endocrine (neg), Heme (neg), All/Immuno (neg)    Mood and Affect Appropriate ( x ),  Oriented to Time, Place, and Person x 3 ( x )    Ophthalmology Exam    Visual acuity (sc): 20/20 OU  Pupils: PERRL OU, no APD  Ttono: 13, 14  Extraocular movements (EOMs): Full OU, no pain, no diplopia  Confrontational Visual Field (CVF):  Full OU  Color Plates: 12/12 OU    Slit Lamp Exam (SLE)  External:  Flat OU  Lids/Lashes/Lacrimal Ducts: Flat OU    Sclera/Conjunctiva:  W+Q OU  Cornea: Cl OU  Anterior Chamber: D+Q OU   Iris:  Flat OU  Lens:  Cl OU    Fundus Exam: dilated with 1% tropicamide and 2.5% phenylephrine  Approval obtained from primary team for dilation  Patient aware that pupils can remained dilated for at least 4-6 hours  Exam performed with 20D lens    Vitreous: wnl OU  Disc, cup/disc: sharp and pink, 0.4 OU  Macula:  wnl OU  Vessels:  wnl OU  Periphery: wnl OU    Diagnostic Testing:    Assessment:      Plan:      Follow-Up:  Patient should follow up his/her ophthalmologist or in the [facility] Ophthalmology Practice within 1 week of discharge  [address] [place] [address]  [phone]    S/D/W Dr Harris (attending) [facility] Ophthalmology Consult Note    HPI: 49 y/o F PMH nephrotic syndrome, hep B s/p treatment, HTN, obesity presenting with left sided chest pain and headache x 2 days. Given headache and CT findings concerning for pseudotumor, neurology recommended ophthalmology consult.    Patient denies eye pain, visual changes, diplopia, flashes/floaters, photophobia, redness, and irritation. She wears contact lenses. Her headache has improved after administration of percoset.       PMH: as above  POcHx (including surgeries/lasers/trauma):  None  Drops: None  FamHx: None  Social Hx: None  Allergies: latex, penicillin    ROS:  General (neg), Vision (per HPI), Head and Neck (neg), Pulm (neg), CV (neg), GI (neg),  (neg), Musculoskeletal (neg), Skin/Integ (neg), Neuro (neg), Endocrine (neg), Heme (neg), All/Immuno (neg)    Mood and Affect Appropriate ( x ),  Oriented to Time, Place, and Person x 3 ( x )    Ophthalmology Exam    Visual acuity (sc): 20/20 OU  Pupils: PERRL OU, no APD  Ttono: 13, 14  Extraocular movements (EOMs): Full OU, no pain, no diplopia  Confrontational Visual Field (CVF):  Full OU  Color Plates: 12/12 OU    Slit Lamp Exam (SLE)  External:  Flat OU  Lids/Lashes/Lacrimal Ducts: Flat OU    Sclera/Conjunctiva:  W+Q OU  Cornea: Cl OU  Anterior Chamber: D+Q OU   Iris:  Flat OU  Lens:  Cl OU    Fundus Exam: dilated with 1% tropicamide and 2.5% phenylephrine  Approval obtained from primary team for dilation  Patient aware that pupils can remained dilated for at least 4-6 hours  Exam performed with 20D lens    Vitreous: wnl OU  Disc, cup/disc: sharp and pink, 0.3 OU, no disc edema, edges clear, no venous tortuosity or hemorrhage  Macula:  wnl OU  Vessels:  wnl OU  Periphery: wnl OU    Diagnostic Testing:    Assessment: 49 y/o F w/ above PMH presenting with headache x 2 days, no papilledema      Plan:  1) Headache associated with elevated blood pressure, less likely IIH  - management per primary team    Follow-Up:  Patient should follow up his/her ophthalmologist or in the [facility] Ophthalmology Practice within 1 week of discharge  [address] [place] [address].  [place] [address]  [phone] [facility] Ophthalmology Consult Note    HPI: 49 y/o F PMH nephrotic syndrome, hep B s/p treatment, HTN, obesity presenting with left sided chest pain and headache x 2 days. Given headache and CT findings concerning for pseudotumor, neurology recommended ophthalmology consult.    Patient denies eye pain, visual changes, diplopia, flashes/floaters, photophobia, redness, and irritation. Occasional transient visual obscurations, nausea,  She wears contact lenses. Her headache has improved after administration of percoset.       PMH: as above  POcHx (including surgeries/lasers/trauma):  None  Drops: None  FamHx: None  Social Hx: None  Allergies: latex, penicillin    ROS:  General (neg), Vision (per HPI), Head and Neck (neg), Pulm (neg), CV (neg), GI (neg),  (neg), Musculoskeletal (neg), Skin/Integ (neg), Neuro (neg), Endocrine (neg), Heme (neg), All/Immuno (neg)    Mood and Affect Appropriate ( x ),  Oriented to Time, Place, and Person x 3 ( x )    Ophthalmology Exam    Visual acuity (sc): 20/20 OU  Pupils: PERRL OU, no APD  Ttono: 13, 14  Extraocular movements (EOMs): Full OU, no pain, no diplopia  Confrontational Visual Field (CVF):  Full OU  Color Plates: 12/12 OU    Slit Lamp Exam (SLE)  External:  Flat OU  Lids/Lashes/Lacrimal Ducts: Flat OU    Sclera/Conjunctiva:  W+Q OU  Cornea: Cl OU  Anterior Chamber: D+Q OU   Iris:  Flat OU  Lens:  Cl OU    Fundus Exam: dilated with 1% tropicamide and 2.5% phenylephrine  Approval obtained from primary team for dilation  Patient aware that pupils can remained dilated for at least 4-6 hours  Exam performed with 20D lens    Vitreous: wnl OU  Disc, cup/disc: ssuspicious nerve left eye with slight disc edema, right eye sharp and pink,, edges clear, no venous tortuosity or hemorrhage  Macula:  wnl OU  Vessels:  wnl OU  Periphery: wnl OU    Diagnostic Testing:    A/P: 49 y/o F w/ above PMH presenting with headache x 2 days. Disc slightly elevated in the left eye. Given patient's symptoms, occasional transient visual obscurations and pulsatile tinnitus, and partially empty sella on CT, would recommend further workup including MRI/MRV and LP.   - MRI/MRV  - LP      Follow-Up:  Patient should follow up his/her ophthalmologist or in the [facility] Ophthalmology Practice within 1 week of discharge  [address] [facility] [place] [address] [phone]

## 2018-08-12 NOTE — PROVIDER CONTACT NOTE (MEDICATION) - ACTION/TREATMENT ORDERED:
provider aware. pt educated on importance of VTE prophylaxis and continues to refuse. will continue to monitor pt

## 2018-08-12 NOTE — CONSULT NOTE ADULT - SUBJECTIVE AND OBJECTIVE BOX
CHIEF COMPLAINT:    HPI:  48F with hx of nephrotic syndrome Dxed with Kidney Bx in  and off Immunosuppressant meds since 3 months ,Hepatitis B S/P treatment and  HTN on Labetalol 200 mg BID here for L sided chest pain since this AM, non-exertional, non-pleuritic. +nausea without vomiting. +TAMEZ yeswith 1 block. Her BP since last week has been running quite high . No fevers, cough, shortness of breath. No recent travel, no surgery, hx of DVT/PE, no hx of CA, no exogenous hormone use. No family hx of cardiac disease at young age.       PAST MEDICAL & SURGICAL HISTORY:  GERD (gastroesophageal reflux disease)  Latex allergy  Obesity, Class III, BMI 40-49.9 (morbid obesity)  Endometriosis  HTN (hypertension)  Dyslipidemia  Membranous Glomerulonephropathy  Chronic Hepatitis B  History of Nephrotic Syndrome  Hx of tonsillectomy: as a child  Hx of cholecystectomy  H/O  section x2: ,           PREVIOUS DIAGNOSTIC TESTING:    [ ] Echocardiogram:  [ ]  Catheterization:  [ ] Stress Test:  	    MEDICATIONS:  MEDICATIONS  (STANDING):  acetaminophen   Tablet. 650 milliGRAM(s) Oral once  aspirin enteric coated 81 milliGRAM(s) Oral daily  heparin  Injectable 5000 Unit(s) SubCutaneous every 12 hours  labetalol 300 milliGRAM(s) Oral two times a day      FAMILY HISTORY:  No pertinent family history in first degree relatives      SOCIAL HISTORY:    [ ] Non-smoker  [ ] Smoker  [ ] Alcohol    Allergies    latex (Unknown)  penicillin (Unknown)    Intolerances    	    REVIEW OF SYSTEMS:  CONSTITUTIONAL: No fever, weight loss, or fatigue  EYES: No eye pain, visual disturbances, or discharge  ENMT:  No difficulty hearing, tinnitus, vertigo; No sinus or throat pain  NECK: No pain or stiffness  RESPIRATORY: No cough, wheezing, chills or hemoptysis; No Shortness of Breath  CARDIOVASCULAR: see HPI  GASTROINTESTINAL: No abdominal or epigastric pain. No nausea, vomiting, or hematemesis; No diarrhea or constipation. No melena or hematochezia.  GENITOURINARY: No dysuria, frequency, hematuria, or incontinence  NEUROLOGICAL: + headaches, memory loss, loss of strength, numbness, or tremors  SKIN: No itching, burning, rashes, or lesions   	    [ ] All others negative	  [ ] Unable to obtain    PHYSICAL EXAM:  T(C): 37.1 (18 @ 06:09), Max: 37.1 (18 @ 06:09)  HR: 71 (18 @ 06:09) (58 - 71)  BP: 132/77 (18 @ 06:09) (132/77 - 194/104)  RR: 18 (18 @ 06:09) (16 - 18)  SpO2: 95% (18 @ 06:09) (95% - 100%)  Wt(kg): --  I&O's Summary      Appearance: Normal	  Psychiatry: A & O x 3, Mood & affect appropriate  HEENT:   Normal oral mucosa, PERRL, EOMI	  Lymphatic: No lymphadenopathy  Cardiovascular: Normal S1 S2,RRR, No JVD, No murmurs  Respiratory: Lungs clear to auscultation	  Gastrointestinal:  Soft, Non-tender, + BS	  Skin: No rashes, No ecchymoses, No cyanosis	  Neurologic: Non-focal  Extremities: Normal range of motion, No clubbing, cyanosis or edema  Vascular: Peripheral pulses palpable 2+ bilaterally    TELEMETRY: 	    ECG:  	NSR  RADIOLOGY:  OTHER: 	  	  LABS:	 	    CARDIAC MARKERS:                                  9.2    6.9   )-----------( 221      ( 11 Aug 2018 14:11 )             27.2         141  |  107  |  61<H>  ----------------------------<  93  4.7   |  21<L>  |  6.13<H>    Ca    9.1      11 Aug 2018 14:11  Mg     1.9         TPro  6.4  /  Alb  3.4  /  TBili  0.3  /  DBili  x   /  AST  15  /  ALT  15  /  AlkPhos  46  0811    PT/INR - ( 11 Aug 2018 14:11 )   PT: 10.4 sec;   INR: 0.96 ratio         PTT - ( 11 Aug 2018 14:11 )  PTT:30.4 sec  proBNP: Serum Pro-Brain Natriuretic Peptide: 708 pg/mL ( @ 14:11)    Lipid Profile:   HgA1c:   TSH:     ASSESSMENT/PLAN:

## 2018-08-12 NOTE — CHART NOTE - NSCHARTNOTEFT_GEN_A_CORE
ROSEMARY GUSTAFSON  48y Female  Patient is a 48y old  Female who presents with a chief complaint of Uncontrolled BP and chest pain (11 Aug 2018 19:23)     Event Summary:  47 y/o F PMH nephrotic syndrome, hep B s/p treatment, HTN, obesity presenting with left sided chest pain and headache x 2 days. Given headache and CT findings concerning for pseudotumor, pt seen by Neurology, and recommended for MRI and MRV.   -Pt aware of recommendations of neuro-imaging. Pt refusing MRI at this time, reports she had one last December, and finds imaging unnecessary at this time. Risks and benefits discussed with patient at length, with pt verbalizing understanding-continues to refuse at this time.  -Imaging discontinued at this time. Plan to continue treating conservatively and will continue with neuro checks.      Nathaniel Saintus [facility]  #[phone]

## 2018-08-13 ENCOUNTER — TRANSCRIPTION ENCOUNTER (OUTPATIENT)
Age: 48
End: 2018-08-13

## 2018-08-13 LAB
24R-OH-CALCIDIOL SERPL-MCNC: 12.3 NG/ML — LOW (ref 30–80)
ANION GAP SERPL CALC-SCNC: 13 MMOL/L — SIGNIFICANT CHANGE UP (ref 5–17)
BUN SERPL-MCNC: 55 MG/DL — HIGH (ref 7–23)
CALCIUM SERPL-MCNC: 8.9 MG/DL — SIGNIFICANT CHANGE UP (ref 8.4–10.5)
CHLORIDE SERPL-SCNC: 104 MMOL/L — SIGNIFICANT CHANGE UP (ref 96–108)
CO2 SERPL-SCNC: 19 MMOL/L — LOW (ref 22–31)
CREAT SERPL-MCNC: 6.02 MG/DL — HIGH (ref 0.5–1.3)
GLUCOSE SERPL-MCNC: 84 MG/DL — SIGNIFICANT CHANGE UP (ref 70–99)
HCT VFR BLD CALC: 27.7 % — LOW (ref 34.5–45)
HGB BLD-MCNC: 9.1 G/DL — LOW (ref 11.5–15.5)
MAGNESIUM SERPL-MCNC: 1.9 MG/DL — SIGNIFICANT CHANGE UP (ref 1.6–2.6)
MCHC RBC-ENTMCNC: 29.8 PG — SIGNIFICANT CHANGE UP (ref 27–34)
MCHC RBC-ENTMCNC: 32.9 GM/DL — SIGNIFICANT CHANGE UP (ref 32–36)
MCV RBC AUTO: 90.6 FL — SIGNIFICANT CHANGE UP (ref 80–100)
PHOSPHATE SERPL-MCNC: 5.5 MG/DL — HIGH (ref 2.5–4.5)
PLATELET # BLD AUTO: 234 K/UL — SIGNIFICANT CHANGE UP (ref 150–400)
POTASSIUM SERPL-MCNC: 4.8 MMOL/L — SIGNIFICANT CHANGE UP (ref 3.5–5.3)
POTASSIUM SERPL-SCNC: 4.8 MMOL/L — SIGNIFICANT CHANGE UP (ref 3.5–5.3)
RBC # BLD: 3.06 M/UL — LOW (ref 3.8–5.2)
RBC # FLD: 12 % — SIGNIFICANT CHANGE UP (ref 10.3–14.5)
SODIUM SERPL-SCNC: 136 MMOL/L — SIGNIFICANT CHANGE UP (ref 135–145)
T3 SERPL-MCNC: 97 NG/DL — SIGNIFICANT CHANGE UP (ref 80–200)
T4 AB SER-ACNC: 6.2 UG/DL — SIGNIFICANT CHANGE UP (ref 4.6–12)
T4 FREE SERPL-MCNC: 1.1 NG/DL — SIGNIFICANT CHANGE UP (ref 0.9–1.8)
TSH SERPL-MCNC: 8.52 UIU/ML — HIGH (ref 0.27–4.2)
WBC # BLD: 7.5 K/UL — SIGNIFICANT CHANGE UP (ref 3.8–10.5)
WBC # FLD AUTO: 7.5 K/UL — SIGNIFICANT CHANGE UP (ref 3.8–10.5)

## 2018-08-13 PROCEDURE — 99233 SBSQ HOSP IP/OBS HIGH 50: CPT

## 2018-08-13 PROCEDURE — 93975 VASCULAR STUDY: CPT | Mod: 26

## 2018-08-13 RX ORDER — ASPIRIN/CALCIUM CARB/MAGNESIUM 324 MG
1 TABLET ORAL
Qty: 30 | Refills: 0 | OUTPATIENT
Start: 2018-08-13 | End: 2018-09-11

## 2018-08-13 RX ORDER — LABETALOL HCL 100 MG
1 TABLET ORAL
Qty: 60 | Refills: 0 | OUTPATIENT
Start: 2018-08-13 | End: 2018-09-11

## 2018-08-13 RX ORDER — LOSARTAN POTASSIUM 100 MG/1
1 TABLET, FILM COATED ORAL
Qty: 0 | Refills: 0 | COMMUNITY

## 2018-08-13 RX ORDER — HYDRALAZINE HCL 50 MG
10 TABLET ORAL EVERY 8 HOURS
Qty: 0 | Refills: 0 | Status: DISCONTINUED | OUTPATIENT
Start: 2018-08-13 | End: 2018-08-14

## 2018-08-13 RX ADMIN — Medication 300 MILLIGRAM(S): at 05:32

## 2018-08-13 RX ADMIN — Medication 10 MILLIGRAM(S): at 21:46

## 2018-08-13 RX ADMIN — Medication 300 MILLIGRAM(S): at 17:10

## 2018-08-13 NOTE — DISCHARGE NOTE ADULT - ADDITIONAL INSTRUCTIONS
Follow up with EITHER your ophthalmologist or at [facility] Practice within 4 days of discharge, sooner if symptoms worsen or change.   Neuro-ophthalmology  Dr. Maynard  [address] [place] [address].  [place] [address]  [phone]    Also f/u with your Nephrologist-Dr. Ya in 1 week

## 2018-08-13 NOTE — DISCHARGE NOTE ADULT - PATIENT PORTAL LINK FT
You can access the [facility] Portal, offered by [facility], by registering with the following website: [email or web address]

## 2018-08-13 NOTE — DISCHARGE NOTE ADULT - MEDICATION SUMMARY - MEDICATIONS TO STOP TAKING
I will STOP taking the medications listed below when I get home from the hospital:    Viread  --  by mouth    tacrolimus  --  by mouth    furosemide  --  by mouth    losartan 50 mg oral tablet  -- 1 tab(s) by mouth once a day

## 2018-08-13 NOTE — DISCHARGE NOTE ADULT - CARE PROVIDER_API CALL
Naveen DUKES, Dr. Bhakti HORTA MD  Nephrologist in [place], [place]   Address: [address] [place] [address]  Phone: [phone]  Fax: (   )    -    Nelson Maynard (MD), Ophthalmology  [address] [place] [address]  Phone: [phone]  Fax: [phone]

## 2018-08-13 NOTE — DISCHARGE NOTE ADULT - HOSPITAL COURSE
48F with hx of nephrotic syndrome Dxed with Kidney Bx in 2010 and off Immunosuppressant meds since 3 months, Hepatitis B S/P treatment and HTN, presented for uncontrolled BP,  L sided non-exertional, non-pleuritic CP. +nausea without vomiting. +TAMEZ yesterday with 1 block, admitted for Hypertensive Urgency. Pt presented with /104, requiring IVP Hydralazine for initial BP control, also c/o HA, CT findings for possible pseudotumor cerebri, was consulted by Neurology, recommended for MRI brain w/o contrast, MRV, and consideration for LP if Pseudotumor Cerebri was truly suspected. Pt aware of recommendations, refusing MRI w/u as well as LP, with pt able to verbalized risks of testing. Pt also seen by Ophthalomology to eval for papilledema, exam suspicious for slight disc edema of left eye nerve recommended for further outpatient w/u. Pt seen by Cards, recommended for stress test which pt has refused citing her symptoms have resolved which she attributes to her BP (risks discussed and pt verbalized understanding), w/u otherwise negative for ischemia. Seen by Renal, recommended for Renal biopsy, pt requesting to defer to Dr. Price when she comes back from trip to [facility]. Renal duplex performed r/o renal vein thrombosis, BP better controlled on current regimen, pt with non-focal neuro exam, asymptomatic, deemed clinically stable for discharge home with close f/u with Dr. Ya from [facility] for further management.

## 2018-08-13 NOTE — DISCHARGE NOTE ADULT - PROVIDER TOKENS
FREE:[LAST:[Naveen DUKES],FIRST:[Dr. Ya],PHONE:[phone]],FAX:[phone]    -],ADDRESS:[Bhakti HORTA MD  Nephrologist in [place], [place]   Address: [address] [place] [address]]],TOKEN:'[id number]'

## 2018-08-13 NOTE — DISCHARGE NOTE ADULT - MEDICATION SUMMARY - MEDICATIONS TO TAKE
I will START or STAY ON the medications listed below when I get home from the hospital:    aspirin 81 mg oral delayed release tablet  -- 1 tab(s) by mouth once a day  -- Indication: For Chest pain    labetalol 300 mg oral tablet  -- 1 tab(s) by mouth 2 times a day  -- Indication: For Hypertensive urgency I will START or STAY ON the medications listed below when I get home from the hospital:    Please excuse Ms. Aranza Wang from work from 8/11/18 through 8/14/18 for hospital admission. She is now medically clear to resume work  -- Indication: For work    aspirin 81 mg oral delayed release tablet  -- 1 tab(s) by mouth once a day  -- Indication: For Chest pain    labetalol 300 mg oral tablet  -- 1 tab(s) by mouth 2 times a day  -- Indication: For Hypertensive urgency    hydrALAZINE 10 mg oral tablet  -- 1 tab(s) by mouth every 8 hours  -- Indication: For Hypertensive urgency

## 2018-08-13 NOTE — DISCHARGE NOTE ADULT - CARE PLAN
Principal Discharge DX:	Hypertensive urgency  Goal:	BP control  Assessment and plan of treatment:	Low salt diet  Activity as tolerated.  Take all medication as prescribed.  Follow up with your medical doctor for routine blood pressure monitoring at your next visit.  Notify your doctor if you have any of the following symptoms:   Dizziness, Lightheadedness, Blurry vision, Headache, Chest pain, Shortness of breath  Secondary Diagnosis:	Nephrotic syndrome  Assessment and plan of treatment:	- CKD Stage IV/V with baseline serum creatinine of 5 in May 2018    - UA showed protein, no RBC or WBC, Renal US negative for thombosis  - Hypertension controlled  - Volume status- euvolemic  - Electrolytes- controlled  Avoid taking (NSAIDs) - (ex: Ibuprofen, Advil, Celebrex, Naprosyn)  Avoid taking any nephrotoxic agents (can harm kidneys) - Intravenous contrast for diagnostic testing, combination cold medications.  Have all medications adjusted for your renal function by your Health Care Provider.  Blood pressure control is important.  Take all medication as prescribed.   FOLLOW-UP WITH DR. PATTON WITHIN 1 WEEK  Secondary Diagnosis:	Optic nerve disease, left  Assessment and plan of treatment:	-exam suspicious for slight disc edema of left eye nerve  -[facility] within 4 days of discharge, sooner if symptoms worsen or change.  Secondary Diagnosis:	Chest pain at rest  Assessment and plan of treatment:	-Symptoms have resolved with improved BP control  -No evidence of ACS  -recommended for stress test which the pt has refused citing her symptoms have resolved which she attributes to her BP  -Follow-up outpatient for echocardiogram and consideration for a stress test

## 2018-08-13 NOTE — PROVIDER CONTACT NOTE (OTHER) - BACKGROUND
Pt admitted for essential HTN w/ history of nephrotic syndrome, endometriosis, GERD, HTN, and obesity.

## 2018-08-13 NOTE — DISCHARGE NOTE ADULT - PLAN OF CARE
BP control Low salt diet  Activity as tolerated.  Take all medication as prescribed.  Follow up with your medical doctor for routine blood pressure monitoring at your next visit.  Notify your doctor if you have any of the following symptoms:   Dizziness, Lightheadedness, Blurry vision, Headache, Chest pain, Shortness of breath - CKD Stage IV/V with baseline serum creatinine of 5 in May 2018    - UA showed protein, no RBC or WBC, Renal US negative for thombosis  - Hypertension controlled  - Volume status- euvolemic  - Electrolytes- controlled  Avoid taking (NSAIDs) - (ex: Ibuprofen, Advil, Celebrex, Naprosyn)  Avoid taking any nephrotoxic agents (can harm kidneys) - Intravenous contrast for diagnostic testing, combination cold medications.  Have all medications adjusted for your renal function by your Health Care Provider.  Blood pressure control is important.  Take all medication as prescribed.   FOLLOW-UP WITH DR. PATTON WITHIN 1 WEEK -exam suspicious for slight disc edema of left eye nerve  -[facility] within 4 days of discharge, sooner if symptoms worsen or change. -Symptoms have resolved with improved BP control  -No evidence of ACS  -recommended for stress test which the pt has refused citing her symptoms have resolved which she attributes to her BP  -Follow-up outpatient for echocardiogram and consideration for a stress test

## 2018-08-13 NOTE — PROGRESS NOTE ADULT - ATTENDING COMMENTS
Patient seen and examined.  Agree with above NP note.  cv stable  resolved chest pain  await echo   refused stress  d/c plan per med  bp tx as ordered

## 2018-08-14 VITALS
OXYGEN SATURATION: 98 % | DIASTOLIC BLOOD PRESSURE: 98 MMHG | HEART RATE: 54 BPM | RESPIRATION RATE: 17 BRPM | TEMPERATURE: 98 F | SYSTOLIC BLOOD PRESSURE: 160 MMHG

## 2018-08-14 LAB
ANION GAP SERPL CALC-SCNC: 15 MMOL/L — SIGNIFICANT CHANGE UP (ref 5–17)
BUN SERPL-MCNC: 57 MG/DL — HIGH (ref 7–23)
CALCIUM SERPL-MCNC: 8.9 MG/DL — SIGNIFICANT CHANGE UP (ref 8.4–10.5)
CHLORIDE SERPL-SCNC: 104 MMOL/L — SIGNIFICANT CHANGE UP (ref 96–108)
CO2 SERPL-SCNC: 19 MMOL/L — LOW (ref 22–31)
CREAT SERPL-MCNC: 6.02 MG/DL — HIGH (ref 0.5–1.3)
GLUCOSE SERPL-MCNC: 81 MG/DL — SIGNIFICANT CHANGE UP (ref 70–99)
HCT VFR BLD CALC: 27.4 % — LOW (ref 34.5–45)
HGB BLD-MCNC: 9.3 G/DL — LOW (ref 11.5–15.5)
MAGNESIUM SERPL-MCNC: 2 MG/DL — SIGNIFICANT CHANGE UP (ref 1.6–2.6)
MCHC RBC-ENTMCNC: 30.7 PG — SIGNIFICANT CHANGE UP (ref 27–34)
MCHC RBC-ENTMCNC: 33.9 GM/DL — SIGNIFICANT CHANGE UP (ref 32–36)
MCV RBC AUTO: 90.5 FL — SIGNIFICANT CHANGE UP (ref 80–100)
PHOSPHATE SERPL-MCNC: 5.9 MG/DL — HIGH (ref 2.5–4.5)
PLATELET # BLD AUTO: 225 K/UL — SIGNIFICANT CHANGE UP (ref 150–400)
POTASSIUM SERPL-MCNC: 5.3 MMOL/L — SIGNIFICANT CHANGE UP (ref 3.5–5.3)
POTASSIUM SERPL-SCNC: 5.3 MMOL/L — SIGNIFICANT CHANGE UP (ref 3.5–5.3)
RBC # BLD: 3.03 M/UL — LOW (ref 3.8–5.2)
RBC # FLD: 11.8 % — SIGNIFICANT CHANGE UP (ref 10.3–14.5)
SODIUM SERPL-SCNC: 138 MMOL/L — SIGNIFICANT CHANGE UP (ref 135–145)
WBC # BLD: 7.2 K/UL — SIGNIFICANT CHANGE UP (ref 3.8–10.5)
WBC # FLD AUTO: 7.2 K/UL — SIGNIFICANT CHANGE UP (ref 3.8–10.5)

## 2018-08-14 PROCEDURE — 87350 HEPATITIS BE AG IA: CPT

## 2018-08-14 PROCEDURE — 86706 HEP B SURFACE ANTIBODY: CPT

## 2018-08-14 PROCEDURE — 84484 ASSAY OF TROPONIN QUANT: CPT

## 2018-08-14 PROCEDURE — 87340 HEPATITIS B SURFACE AG IA: CPT

## 2018-08-14 PROCEDURE — 86704 HEP B CORE ANTIBODY TOTAL: CPT

## 2018-08-14 PROCEDURE — 80061 LIPID PANEL: CPT

## 2018-08-14 PROCEDURE — 84443 ASSAY THYROID STIM HORMONE: CPT

## 2018-08-14 PROCEDURE — 85730 THROMBOPLASTIN TIME PARTIAL: CPT

## 2018-08-14 PROCEDURE — 82310 ASSAY OF CALCIUM: CPT

## 2018-08-14 PROCEDURE — 82746 ASSAY OF FOLIC ACID SERUM: CPT

## 2018-08-14 PROCEDURE — 86707 HEPATITIS BE ANTIBODY: CPT

## 2018-08-14 PROCEDURE — 83880 ASSAY OF NATRIURETIC PEPTIDE: CPT

## 2018-08-14 PROCEDURE — 81001 URINALYSIS AUTO W/SCOPE: CPT

## 2018-08-14 PROCEDURE — 83550 IRON BINDING TEST: CPT

## 2018-08-14 PROCEDURE — 99285 EMERGENCY DEPT VISIT HI MDM: CPT | Mod: 25

## 2018-08-14 PROCEDURE — 83036 HEMOGLOBIN GLYCOSYLATED A1C: CPT

## 2018-08-14 PROCEDURE — 83010 ASSAY OF HAPTOGLOBIN QUANT: CPT

## 2018-08-14 PROCEDURE — 80048 BASIC METABOLIC PNL TOTAL CA: CPT

## 2018-08-14 PROCEDURE — 96374 THER/PROPH/DIAG INJ IV PUSH: CPT

## 2018-08-14 PROCEDURE — 85610 PROTHROMBIN TIME: CPT

## 2018-08-14 PROCEDURE — 86803 HEPATITIS C AB TEST: CPT

## 2018-08-14 PROCEDURE — 86038 ANTINUCLEAR ANTIBODIES: CPT

## 2018-08-14 PROCEDURE — 84480 ASSAY TRIIODOTHYRONINE (T3): CPT

## 2018-08-14 PROCEDURE — 93975 VASCULAR STUDY: CPT

## 2018-08-14 PROCEDURE — 82607 VITAMIN B-12: CPT

## 2018-08-14 PROCEDURE — 84436 ASSAY OF TOTAL THYROXINE: CPT

## 2018-08-14 PROCEDURE — 96375 TX/PRO/DX INJ NEW DRUG ADDON: CPT

## 2018-08-14 PROCEDURE — 74176 CT ABD & PELVIS W/O CONTRAST: CPT

## 2018-08-14 PROCEDURE — 83970 ASSAY OF PARATHORMONE: CPT

## 2018-08-14 PROCEDURE — 84100 ASSAY OF PHOSPHORUS: CPT

## 2018-08-14 PROCEDURE — 85027 COMPLETE CBC AUTOMATED: CPT

## 2018-08-14 PROCEDURE — 70450 CT HEAD/BRAIN W/O DYE: CPT

## 2018-08-14 PROCEDURE — 82728 ASSAY OF FERRITIN: CPT

## 2018-08-14 PROCEDURE — 84439 ASSAY OF FREE THYROXINE: CPT

## 2018-08-14 PROCEDURE — 84156 ASSAY OF PROTEIN URINE: CPT

## 2018-08-14 PROCEDURE — 82306 VITAMIN D 25 HYDROXY: CPT

## 2018-08-14 PROCEDURE — 83735 ASSAY OF MAGNESIUM: CPT

## 2018-08-14 PROCEDURE — 71250 CT THORAX DX C-: CPT

## 2018-08-14 PROCEDURE — 71046 X-RAY EXAM CHEST 2 VIEWS: CPT

## 2018-08-14 PROCEDURE — 80053 COMPREHEN METABOLIC PANEL: CPT

## 2018-08-14 RX ORDER — HYDRALAZINE HCL 50 MG
1 TABLET ORAL
Qty: 90 | Refills: 0 | OUTPATIENT
Start: 2018-08-14

## 2018-08-14 RX ADMIN — Medication 300 MILLIGRAM(S): at 06:10

## 2018-08-14 RX ADMIN — Medication 10 MILLIGRAM(S): at 09:02

## 2018-08-14 NOTE — PROGRESS NOTE ADULT - ASSESSMENT
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 She was treated with steroids and CNI, that was not effective so she was treated with Rituximab. Last dose was about a year ago.  . She was Dr. Price who requested a repeat bipsy next month because of worsening kidney function to be done in Sept(after her trip to [facility]).      - CKD Stage IV/V with baseline serum creatinine of  5 in May 2018    -  renal venin thrombosis- UA showed protein, no RBC or WBC  - Hypertension controlled  - Volume status- euvolemic  - Electrolytes- controlled  - Anemia  -Nephrotic 5 grams of proteinuria    PLAN:      - defer diuretics and IVF  - Patient refusing to get a biopsy done at this time because she has a trip to [place] in 10 days. She wants to follow with Dr. Price when she comes back   -  Renal duplex to RO Renal vein thrombosis(If possible would like to get done before dc...In theory can be done as outpt and has an outpt nephrologist who follows her closely)  - Add hydralazine 10 mg po TID if SBP > 140  - Renal dosing of meds to creatinine clearance of 10-15ml/min  - Avoid nephrotoxins as able  - No indication for renal replacement therapy at this time      Thank you for the courtesy of the referral       [facility]  [phone]
48F with hx of nephrotic syndrome Dxed with Kidney Bx in 2010 and off Immunosuppressant meds since 3 months ,Hepatitis B S/P treatment and  HTN on Labetalol 200 mg BID here for L sided chest pain since this AM, non-exertional, non-pleuritic. +nausea without vomiting. +TAMEZ yesterday with 1 block. Her BP since last week has been running quite high . No fevers, cough, shortness of breath. No recent travel, no surgery, hx of DVT/PE, no hx of CA, no exogenous hormone use. No family hx of cardiac disease at young age.     Problem/Plan - 1:  ·  Problem: Hypertensive urgency.  Plan: BP readings much better but still has some high readings . On Labetalol and hydralazine . Renal hep appreciated. Said will adjust BP medication outpt.      Problem/Plan - 2:  ·  Problem: Chest pain.  Plan: No more CP . Trop neg . Refusing further work up. . CT chest and Abdomen with no contrast is negative. Cardiology consult noted.  TTE pending and refusing Stress test.      Problem/Plan - 3:  ·  Problem: TAMEZ (dyspnea on exertion).  Plan: R/O ACS and cardiology consulted. Refusing Cardiac work up.      Problem/Plan - 4:  ·  Problem:  CKD (chronic kidney disease) stage 4 to 5 , GFR 15-29 ml/min.  Plan: Her Nephrologist planning repeat kidney Bx but going to [facility] to attend wedding first. Renal helping and will follow up with her nephrologist.      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  Plan: S/P CT head ..< from: CT Head No Cont (08.11.18 @ 16:02) >    IMPRESSION:    No change from 11/8/2017, there are few nonspecific foci of white matter   decreased attenuation, likely minimal microvascular disease or sequela of   migraines. There is no acute hemorrhage or midline shift.    Partially empty sella with slightly prominent optic nerve sheaths, this   can be associated with pseudotumor cerebri. If symptoms persist consider   follow up CT or MRI if no contraindications.    < end of copied text >  Neurology consult  noted . Refusing MRI ,MRV and LP . Optho help appreciated . Wants to follow up outpt. Risks explained in detail and verbalized understanding.    Problem/Plan - 6:  Problem: Nephrotic syndrome. Plan: Off Immunosuppressants since 3 months proteinuria had improved. F/OU with Dr Ya at [facility].     Problem/Plan - 7:  ·  Problem: Anemia due to chronic kidney disease.  Plan:HH stable . Chronic likely from CKD. Outpt follow up.      Problem/Plan - 8:  ·  Problem: Elevated TSH .  Plan: T4 and T3 okay . Needs outpt TFT with endocrinology follow up . Refusing to see one here.
48 year old woman with PMH of Nephrotic Syndrome , HBV, HTN presenting with chest pain, headacghe and nausea in the setting of hypertensive urgency    1. Chest Pain  -Symptoms have resolved with improved BP control  -No evidence of ACS  -recommend stress test which the pt has refused citing her symptoms have resolved which she attributes to her BP  -pending ECHO    2. HTN  -BP improved  -cont labetalol   -add hydral 10mg TID if bp >140 per renal     3. Nephrotic Syndrome  -Cr rising, ANEL  -renal u/s pending   -renal f/u     dvt ppx
48F with hx of nephrotic syndrome Dxed with Kidney Bx in 2010 and off Immunosuppressant meds since 3 months ,Hepatitis B S/P treatment and  HTN on Labetalol 200 mg BID here for L sided chest pain since this AM, non-exertional, non-pleuritic. +nausea without vomiting. +TAMEZ yesterday with 1 block. Her BP since last week has been running quite high . No fevers, cough, shortness of breath. No recent travel, no surgery, hx of DVT/PE, no hx of CA, no exogenous hormone use. No family hx of cardiac disease at young age.     Problem/Plan - 1:  ·  Problem: Hypertensive urgency.  Plan: BP readings much better . On Labetalol and hydralazine . Renal hep appreciated.      Problem/Plan - 2:  ·  Problem: Chest pain.  Plan: No more CP . Trop neg . Refusing further work up. . CT chest and Abdomen with no contrast is negative. Cardiology consult noted.  TTE pending and refusing Stress test.      Problem/Plan - 3:  ·  Problem: TAMEZ (dyspnea on exertion).  Plan: R/O ACS and cardiology consulted. Refusing Cardiac work up.      Problem/Plan - 4:  ·  Problem:  CKD (chronic kidney disease) stage 4 to 5 , GFR 15-29 ml/min.  Plan: Her Nephrologist planning repeat kidney Bx but going to [facility] to attend wedding first. Renal helping and will follow up with her nephrologist.      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  Plan: S/P CT head ..< from: CT Head No Cont (08.11.18 @ 16:02) >    IMPRESSION:    No change from 11/8/2017, there are few nonspecific foci of white matter   decreased attenuation, likely minimal microvascular disease or sequela of   migraines. There is no acute hemorrhage or midline shift.    Partially empty sella with slightly prominent optic nerve sheaths, this   can be associated with pseudotumor cerebri. If symptoms persist consider   follow up CT or MRI if no contraindications.    < end of copied text >  Neurology consult  noted . Refusing MRI ,MRV and LP . Optho help appreciated . Wants to follow up outpt. Risks explained in detail and verbalized understanding.    Problem/Plan - 6:  Problem: Nephrotic syndrome. Plan: Off Immunosuppressants since 3 months proteinuria had improved. F/OU with Dr Ya at [facility].     Problem/Plan - 7:  ·  Problem: Anemia due to chronic kidney disease.  Plan:HH stable . Chronic likely from CKD     Problem/Plan - 8:  ·  Problem: Elevated TSH .  Plan: T4 and T# okay . needs outpt TFT with endocrinology follow up . Refusing to see one here.
48F with hx of nephrotic syndrome Dxed with Kidney Bx in 2010 and off Immunosuppressant meds since 3 months ,Hepatitis B S/P treatment and  HTN on Labetalol 200 mg BID here for L sided chest pain since this AM, non-exertional, non-pleuritic. +nausea without vomiting. +TAMEZ yesterday with 1 block. Her BP since last week has been running quite high . No fevers, cough, shortness of breath. No recent travel, no surgery, hx of DVT/PE, no hx of CA, no exogenous hormone use. No family hx of cardiac disease at young age.     Problem/Plan - 1:  ·  Problem: Hypertensive urgency.  Plan: Increased Labetalol to 300mg BID from 200 mg bid but BP still going high .  wants her on Diovan HCTZ so called her Nephrologist Dr Ya and left message twice for call back. Renal also consulted here.      Problem/Plan - 2:  ·  Problem: Chest pain.  Plan: No more CP . Trop neg . Refusing further work up. . CT chest and Abdomen with no contrast is negative. Cardiology consult noted.  Will order Echocardiogram and further work up per cardiology.     Problem/Plan - 3:  ·  Problem: TAMEZ (dyspnea on exertion).  Plan: R/O ACS and cardiology consulted. Refusing Cardiac work up.      Problem/Plan - 4:  ·  Problem:  CKD (chronic kidney disease) stage 4, GFR 15-29 ml/min.  Plan: Her Nephrologist planning repeat kidney Bx but going to [facility] to attend wedding first. Renal consulted and also placed a call out to her Nephrologist twice.      Problem/Plan - 5:  ·  Problem: Headache .  Plan: S/P CT head ..< from: CT Head No Cont (08.11.18 @ 16:02) >    IMPRESSION:    No change from 11/8/2017, there are few nonspecific foci of white matter   decreased attenuation, likely minimal microvascular disease or sequela of   migraines. There is no acute hemorrhage or midline shift.    Partially empty sella with slightly prominent optic nerve sheaths, this   can be associated with pseudotumor cerebri. If symptoms persist consider   follow up CT or MRI if no contraindications.    < end of copied text >  Neurology consulted.    Problem/Plan - 6:  Problem: Nephrotic syndrome. Plan: Off Immunosuppressants since 3 months proteinuria had improved. F/OU with Dr Ya at [facility].     Problem/Plan - 7:  ·  Problem: Anemia due to chronic kidney disease.  Plan: Will monitor. Chronic likely from CKD     Problem/Plan - 8:  ·  Problem: Elevated TSH .  Plan: Will check full TFT in AM .m
Headache now improved - likely secondary to change in blood pressure    Exam normal and no need for further imaging at this time.    Would have her follow up with outpatient neurology [phone]

## 2018-08-14 NOTE — PROGRESS NOTE ADULT - SUBJECTIVE AND OBJECTIVE BOX
INTERVAL HPI/OVERNIGHT EVENTS: I just want to go home. My car will be pulling in front in 1 hour.   Vital Signs Last 24 Hrs  T(C): 36.5 (14 Aug 2018 08:51), Max: 37.2 (13 Aug 2018 14:20)  T(F): 97.7 (14 Aug 2018 08:51), Max: 98.9 (13 Aug 2018 14:20)  HR: 54 (14 Aug 2018 08:51) (54 - 69)  BP: 160/98 (14 Aug 2018 08:51) (122/77 - 164/97)  BP(mean): --  RR: 17 (14 Aug 2018 08:51) (14 - 18)  SpO2: 98% (14 Aug 2018 08:51) (96% - 99%)  I&O's Summary    13 Aug 2018 07:01  -  14 Aug 2018 07:00  --------------------------------------------------------  IN: 1020 mL / OUT: 0 mL / NET: 1020 mL      MEDICATIONS  (STANDING):  aspirin enteric coated 81 milliGRAM(s) Oral daily  bisacodyl 10 milliGRAM(s) Oral once  heparin  Injectable 5000 Unit(s) SubCutaneous every 12 hours  hydrALAZINE 10 milliGRAM(s) Oral every 8 hours  labetalol 300 milliGRAM(s) Oral two times a day    MEDICATIONS  (PRN):    LABS:                        9.3    7.2   )-----------( 225      ( 14 Aug 2018 05:53 )             27.4     08-14    138  |  104  |  57<H>  ----------------------------<  81  5.3   |  19<L>  |  6.02<H>    Ca    8.9      14 Aug 2018 05:53  Phos  5.9     08-14  Mg     2.0     08-14          CAPILLARY BLOOD GLUCOSE              REVIEW OF SYSTEMS:  CONSTITUTIONAL: No fever, weight loss, or fatigue  EYES: No eye pain, visual disturbances, or discharge  ENMT:  No difficulty hearing, tinnitus, vertigo; No sinus or throat pain  NECK: No pain or stiffness  BREASTS: No pain, masses, or nipple discharge  RESPIRATORY: No cough, wheezing, chills or hemoptysis; No shortness of breath  CARDIOVASCULAR: No chest pain, palpitations, dizziness, or leg swelling  GASTROINTESTINAL: No abdominal or epigastric pain. No nausea, vomiting, or hematemesis; No diarrhea or constipation. No melena or hematochezia.  GENITOURINARY: No dysuria, frequency, hematuria, or incontinence  NEUROLOGICAL: No headaches, memory loss, loss of strength, numbness, or tremors  SKIN: No itching, burning, rashes, or lesions       Consultant(s) Notes Reviewed:  [x ] YES  [ ] NO    PHYSICAL EXAM:  GENERAL: NAD, Obese ,not in any distress ,  HEAD:  Atraumatic, Normocephalic  EYES: EOMI, PERRLA, conjunctiva and sclera clear  ENMT: No tonsillar erythema, exudates, or enlargement; Moist mucous membranes, Good dentition, No lesions  NECK: Supple, No JVD, Normal thyroid  NERVOUS SYSTEM:  Alert & Oriented X3, No focal deficit   CHEST/LUNG: Good air entry bilateral with no  rales, rhonchi, wheezing, or rubs  HEART: Regular rate and rhythm; No murmurs, rubs, or gallops  ABDOMEN: Soft, Nontender, Nondistended; Bowel sounds present  EXTREMITIES:  2+ Peripheral Pulses, No clubbing, cyanosis, or edema  SKIN: No rashes or lesions    Care Discussed with Consultants/Other Providers [ x] YES  [ ] NO
CARDIOLOGY FOLLOW UP - Dr. Joshi    CC no cp/sob       PHYSICAL EXAM:  T(C): 36.7 (08-13-18 @ 10:41), Max: 37 (08-13-18 @ 01:10)  HR: 61 (08-13-18 @ 10:41) (58 - 84)  BP: 159/98 (08-13-18 @ 10:41) (125/83 - 168/86)  RR: 18 (08-13-18 @ 10:41) (1 - 18)  SpO2: 97% (08-13-18 @ 10:41) (96% - 100%)  Wt(kg): --  I&O's Summary    12 Aug 2018 07:01  -  13 Aug 2018 07:00  --------------------------------------------------------  IN: 640 mL / OUT: 0 mL / NET: 640 mL    13 Aug 2018 07:01  -  13 Aug 2018 13:21  --------------------------------------------------------  IN: 240 mL / OUT: 0 mL / NET: 240 mL        Appearance: Normal	  Cardiovascular: Normal S1 S2,RRR, No JVD, No murmurs  Respiratory: Lungs clear to auscultation	  Gastrointestinal:  Soft, Non-tender, + BS	  Extremities: Normal range of motion, No clubbing, cyanosis or edema        MEDICATIONS  (STANDING):  aspirin enteric coated 81 milliGRAM(s) Oral daily  heparin  Injectable 5000 Unit(s) SubCutaneous every 12 hours  labetalol 300 milliGRAM(s) Oral two times a day      TELEMETRY: 	    ECG:  	  RADIOLOGY:   DIAGNOSTIC TESTING:  [ ] Echocardiogram:  [ ]  Catheterization:  [ ] Stress Test:    OTHER: 	    LABS:	 	                                9.1    7.5   )-----------( 234      ( 13 Aug 2018 06:58 )             27.7     08-13    136  |  104  |  55<H>  ----------------------------<  84  4.8   |  19<L>  |  6.02<H>    Ca    8.9      13 Aug 2018 06:58  Phos  5.5     08-13  Mg     1.9     08-13    TPro  6.4  /  Alb  3.4  /  TBili  0.3  /  DBili  x   /  AST  15  /  ALT  15  /  AlkPhos  46  08-11    PT/INR - ( 11 Aug 2018 14:11 )   PT: 10.4 sec;   INR: 0.96 ratio         PTT - ( 11 Aug 2018 14:11 )  PTT:30.4 sec
INTERVAL HPI/OVERNIGHT EVENTS: I am having Headache again . I slept okay . Son in room and spoke to her  also over speaker phone. No more CP.   Vital Signs Last 24 Hrs  T(C): 36.7 (12 Aug 2018 10:00), Max: 37.1 (12 Aug 2018 06:09)  T(F): 98 (12 Aug 2018 10:00), Max: 98.7 (12 Aug 2018 06:09)  HR: 56 (12 Aug 2018 10:00) (56 - 71)  BP: 170/90 (12 Aug 2018 10:01) (132/77 - 194/104)  BP(mean): --  RR: 16 (12 Aug 2018 10:00) (16 - 18)  SpO2: 97% (12 Aug 2018 10:00) (95% - 100%)  I&O's Summary    MEDICATIONS  (STANDING):  aspirin enteric coated 81 milliGRAM(s) Oral daily  heparin  Injectable 5000 Unit(s) SubCutaneous every 12 hours  HYDROmorphone  Injectable 0.5 milliGRAM(s) IV Push once  labetalol 300 milliGRAM(s) Oral two times a day    MEDICATIONS  (PRN):    LABS:                        9.4    6.9   )-----------( 241      ( 12 Aug 2018 07:10 )             29.4     08-12    139  |  107  |  58<H>  ----------------------------<  89  4.8   |  19<L>  |  5.93<H>    Ca    8.9      12 Aug 2018 07:08  Mg     1.9     08-11    TPro  6.4  /  Alb  3.4  /  TBili  0.3  /  DBili  x   /  AST  15  /  ALT  15  /  AlkPhos  46  08-11    PT/INR - ( 11 Aug 2018 14:11 )   PT: 10.4 sec;   INR: 0.96 ratio         PTT - ( 11 Aug 2018 14:11 )  PTT:30.4 sec  Urinalysis Basic - ( 11 Aug 2018 18:10 )    Color: Colorless / Appearance: SL Turbid / S.008 / pH: x  Gluc: x / Ketone: Negative  / Bili: Negative / Urobili: Negative   Blood: x / Protein: 300 mg/dL / Nitrite: Negative   Leuk Esterase: Negative / RBC: 3-5 /HPF / WBC 0-2 /HPF   Sq Epi: x / Non Sq Epi: OCC /HPF / Bacteria: Few /HPF      CAPILLARY BLOOD GLUCOSE            Urinalysis Basic - ( 11 Aug 2018 18:10 )    Color: Colorless / Appearance: SL Turbid / S.008 / pH: x  Gluc: x / Ketone: Negative  / Bili: Negative / Urobili: Negative   Blood: x / Protein: 300 mg/dL / Nitrite: Negative   Leuk Esterase: Negative / RBC: 3-5 /HPF / WBC 0-2 /HPF   Sq Epi: x / Non Sq Epi: OCC /HPF / Bacteria: Few /HPF      REVIEW OF SYSTEMS:  CONSTITUTIONAL: No fever, weight loss, or fatigue  EYES: No eye pain, visual disturbances, or discharge  ENMT:  No difficulty hearing, tinnitus, vertigo; No sinus or throat pain  NECK: No pain or stiffness  RESPIRATORY: No cough, wheezing, chills or hemoptysis; No shortness of breath  CARDIOVASCULAR: No chest pain, palpitations, dizziness, or leg swelling  GASTROINTESTINAL: No abdominal or epigastric pain. No nausea, vomiting, or hematemesis; No diarrhea or constipation. No melena or hematochezia.  GENITOURINARY: No dysuria, frequency, hematuria, or incontinence  NEUROLOGICAL:  headaches,but no  memory loss, loss of strength, numbness, or tremors      Consultant(s) Notes Reviewed:  [x ] YES  [ ] NO    PHYSICAL EXAM:  GENERAL: NAD, Obese ,not in any distress ,  HEAD:  Atraumatic, Normocephalic  EYES: EOMI, PERRLA, conjunctiva and sclera clear  ENMT: No tonsillar erythema, exudates, or enlargement; Moist mucous membranes, Good dentition, No lesions  NECK: Supple, No JVD, Normal thyroid  NERVOUS SYSTEM:  Alert & Oriented X3, No focal deficit , no meningeal signs   CHEST/LUNG: Good air entry bilateral with no  rales, rhonchi, wheezing, or rubs  HEART: Regular rate and rhythm; No murmurs, rubs, or gallops  ABDOMEN: Soft, Nontender, Nondistended; Bowel sounds present  EXTREMITIES:  2+ Peripheral Pulses, No clubbing, cyanosis, or edema  SKIN: No rashes or lesions    Care Discussed with Consultants/Other Providers [ x] YES  [ ] NO
INTERVAL HPI/OVERNIGHT EVENTS: I feel better. I want to go home after Renal Doppler   Vital Signs Last 24 Hrs  T(C): 36.7 (13 Aug 2018 10:41), Max: 37 (13 Aug 2018 01:10)  T(F): 98.1 (13 Aug 2018 10:41), Max: 98.6 (13 Aug 2018 01:10)  HR: 61 (13 Aug 2018 10:41) (58 - 84)  BP: 159/98 (13 Aug 2018 10:41) (125/83 - 168/86)  BP(mean): --  RR: 18 (13 Aug 2018 10:41) (1 - 18)  SpO2: 97% (13 Aug 2018 10:41) (96% - 100%)  I&O's Summary    12 Aug 2018 07:  -  13 Aug 2018 07:00  --------------------------------------------------------  IN: 640 mL / OUT: 0 mL / NET: 640 mL    13 Aug 2018 07:01  -  13 Aug 2018 13:58  --------------------------------------------------------  IN: 240 mL / OUT: 0 mL / NET: 240 mL      MEDICATIONS  (STANDING):  aspirin enteric coated 81 milliGRAM(s) Oral daily  heparin  Injectable 5000 Unit(s) SubCutaneous every 12 hours  labetalol 300 milliGRAM(s) Oral two times a day    MEDICATIONS  (PRN):    LABS:                        9.1    7.5   )-----------( 234      ( 13 Aug 2018 06:58 )             27.7     08-13    136  |  104  |  55<H>  ----------------------------<  84  4.8   |  19<L>  |  6.02<H>    Ca    8.9      13 Aug 2018 06:58  Phos  5.5     08-13  Mg     1.9     13    TPro  6.4  /  Alb  3.4  /  TBili  0.3  /  DBili  x   /  AST  15  /  ALT  15  /  AlkPhos  46  08-11    PT/INR - ( 11 Aug 2018 14:11 )   PT: 10.4 sec;   INR: 0.96 ratio         PTT - ( 11 Aug 2018 14:11 )  PTT:30.4 sec  Urinalysis Basic - ( 11 Aug 2018 18:10 )    Color: Colorless / Appearance: SL Turbid / S.008 / pH: x  Gluc: x / Ketone: Negative  / Bili: Negative / Urobili: Negative   Blood: x / Protein: 300 mg/dL / Nitrite: Negative   Leuk Esterase: Negative / RBC: 3-5 /HPF / WBC 0-2 /HPF   Sq Epi: x / Non Sq Epi: OCC /HPF / Bacteria: Few /HPF      CAPILLARY BLOOD GLUCOSE            Urinalysis Basic - ( 11 Aug 2018 18:10 )    Color: Colorless / Appearance: SL Turbid / S.008 / pH: x  Gluc: x / Ketone: Negative  / Bili: Negative / Urobili: Negative   Blood: x / Protein: 300 mg/dL / Nitrite: Negative   Leuk Esterase: Negative / RBC: 3-5 /HPF / WBC 0-2 /HPF   Sq Epi: x / Non Sq Epi: OCC /HPF / Bacteria: Few /HPF      REVIEW OF SYSTEMS:  CONSTITUTIONAL: No fever, weight loss, or fatigue  EYES: No eye pain, visual disturbances, or discharge  ENMT:  No difficulty hearing, tinnitus, vertigo; No sinus or throat pain  NECK: No pain or stiffness  BREASTS: No pain, masses, or nipple discharge  RESPIRATORY: No cough, wheezing, chills or hemoptysis; No shortness of breath  CARDIOVASCULAR: No chest pain, palpitations, dizziness, or leg swelling  GASTROINTESTINAL: No abdominal or epigastric pain. No nausea, vomiting, or hematemesis; No diarrhea or constipation. No melena or hematochezia.  GENITOURINARY: No dysuria, frequency, hematuria, or incontinence  NEUROLOGICAL: No headaches, memory loss, loss of strength, numbness, or tremors  SKIN: No itching, burning, rashes, or lesions   LYMPH NODES: No enlarged glands  ENDOCRINE: No heat or cold intolerance; No hair loss  MUSCULOSKELETAL: No joint pain or swelling; No muscle, back, or extremity pain      Consultant(s) Notes Reviewed:  [x ] YES  [ ] NO    PHYSICAL EXAM:  GENERAL: NAD, Obese , not in any distress ,  HEAD:  Atraumatic, Normocephalic  EYES: EOMI, PERRLA, conjunctiva and sclera clear  ENMT: No tonsillar erythema, exudates, or enlargement; Moist mucous membranes, Good dentition, No lesions  NECK: Supple, No JVD, Normal thyroid  NERVOUS SYSTEM:  Alert & Oriented X3, No focal deficit   CHEST/LUNG: Good air entry bilateral with no  rales, rhonchi, wheezing, or rubs  HEART: Regular rate and rhythm; No murmurs, rubs, or gallops  ABDOMEN: Soft, Nontender, Nondistended; Bowel sounds present  EXTREMITIES:  2+ Peripheral Pulses, No clubbing, cyanosis, or edema  SKIN: No rashes or lesions    Care Discussed with Consultants/Other Providers [ x] YES  [ ] NO
Ophthalmology Follow Up Note    49 y/o F w/ PMH nephrotic syndrome, hep B s/p treatment, HTN, obesity presenting with headache x 2 days. Ophthalmology exam notable for disc slightly elevated in the left eye. Given patient's symptoms, occasional transient visual obscuration and pulsatile tinnitus, and partially empty sella on CT, recommend further workup including MRI/MRV and LP.     Patient examined bedside this AM. Patient reports no current visual symptoms. Patient reports refusing MRI/MRV and LP. Discussed with patient r/b/a of proper workup for disc edema in the left eye. Patient expressed an understanding.   - Outpatient follow up stressed to patient; rec f/u in a neuro-ophthalmologist office within 4 days from discharge.       Follow-Up:  Patient should follow up his/her ophthalmologist or in the [facility] within 4 days of discharge, sooner if symptoms worsen or change.     Neuro-ophthalmology  Dr. Maynard  [address] [place] [address].  [place] [address] [phone]    Please confirm a time and date of appointment prior to discharge (call the physicians office)
S: Patient doing well with no headache at this time; symptoms resolved.    MEDICATIONS  (STANDING):  aspirin enteric coated 81 milliGRAM(s) Oral daily  heparin  Injectable 5000 Unit(s) SubCutaneous every 12 hours  labetalol 300 milliGRAM(s) Oral two times a day    MEDICATIONS  (PRN):    Vital Signs Last 24 Hrs  T(C): 36.7 (13 Aug 2018 10:41), Max: 37 (13 Aug 2018 01:10)  T(F): 98.1 (13 Aug 2018 10:41), Max: 98.6 (13 Aug 2018 01:10)  HR: 61 (13 Aug 2018 10:41) (58 - 84)  BP: 159/98 (13 Aug 2018 10:41) (125/83 - 168/86)  BP(mean): --  RR: 18 (13 Aug 2018 10:41) (1 - 18)  SpO2: 97% (13 Aug 2018 10:41) (96% - 100%)    PERRL, EOMI, VI-V3 intact, No facial droop, Hearing intact b/l  5/5 b/l with no drift  FNF intact b/l  Sensory intact b/l
NEPHROLOGY-[facility] [phone]        Patient seen and examined in bed.  She was not SOB this am and had no chest pain        MEDICATIONS  (STANDING):  aspirin enteric coated 81 milliGRAM(s) Oral daily  heparin  Injectable 5000 Unit(s) SubCutaneous every 12 hours  labetalol 300 milliGRAM(s) Oral two times a day      VITAL:  T(C): , Max: 37.1 (18 @ 11:40)  T(F): , Max: 98.8 (18 @ 11:40)  HR: 62 (18 @ 05:30)  BP: 138/84 (18 @ 05:30)  BP(mean): --  RR: 18 (18 @ 05:09)  SpO2: 100% (18 @ 05:09)  Wt(kg): --    I and O's:     @ 07:01  -   @ 07:00  --------------------------------------------------------  IN: 640 mL / OUT: 0 mL / NET: 640 mL          PHYSICAL EXAM:    Constitutional: obese  HEENT: PERRLA    Neck:  No JVD  Respiratory: CTAB/L  Cardiovascular: S1 and S2  Gastrointestinal: BS+, soft, NT/ND  Extremities: No peripheral edema  Neurological: A/O x 3, no focal deficits  Psychiatric: Normal mood, normal affect  : No Jay  Skin: No rashes  Access: Not applicable    LABS:                        9.1    7.5   )-----------( 234      ( 13 Aug 2018 06:58 )             27.7     08-    136  |  104  |  55<H>  ----------------------------<  84  4.8   |  19<L>  |  6.02<H>    Ca    8.9      13 Aug 2018 06:58  Phos  5.5       Mg     1.9         TPro  6.4  /  Alb  3.4  /  TBili  0.3  /  DBili  x   /  AST  15  /  ALT  15  /  AlkPhos  46  08-          Urine Studies:  Urinalysis Basic - ( 11 Aug 2018 18:10 )    Color: Colorless / Appearance: SL Turbid / S.008 / pH: x  Gluc: x / Ketone: Negative  / Bili: Negative / Urobili: Negative   Blood: x / Protein: 300 mg/dL / Nitrite: Negative   Leuk Esterase: Negative / RBC: 3-5 /HPF / WBC 0-2 /HPF   Sq Epi: x / Non Sq Epi: OCC /HPF / Bacteria: Few /HPF      Creatinine, Random Urine: 51 mg/dL ( @ 20:58)  Protein/Creatinine Ratio Calculation: 5.1 Ratio ( @ 20:58)        RADIOLOGY & ADDITIONAL STUDIES:

## 2018-08-15 LAB
ANA TITR SER: NEGATIVE — SIGNIFICANT CHANGE UP
HBV E AB SER-ACNC: POSITIVE
HBV E AG SER-ACNC: NEGATIVE — SIGNIFICANT CHANGE UP

## 2018-09-12 ENCOUNTER — EMERGENCY (EMERGENCY)
Facility: HOSPITAL | Age: 48
LOS: 1 days | Discharge: HOME | End: 2018-09-12
Payer: COMMERCIAL

## 2018-09-12 VITALS
RESPIRATION RATE: 24 BRPM | OXYGEN SATURATION: 98 % | SYSTOLIC BLOOD PRESSURE: 201 MMHG | TEMPERATURE: 98 F | HEART RATE: 68 BPM | DIASTOLIC BLOOD PRESSURE: 109 MMHG

## 2018-09-12 VITALS
TEMPERATURE: 98 F | OXYGEN SATURATION: 97 % | SYSTOLIC BLOOD PRESSURE: 170 MMHG | HEART RATE: 72 BPM | DIASTOLIC BLOOD PRESSURE: 117 MMHG | RESPIRATION RATE: 20 BRPM

## 2018-09-12 PROCEDURE — 94640 AIRWAY INHALATION TREATMENT: CPT

## 2018-09-12 PROCEDURE — 99284 EMERGENCY DEPT VISIT MOD MDM: CPT | Mod: 25

## 2018-09-12 PROCEDURE — 99283 EMERGENCY DEPT VISIT LOW MDM: CPT | Mod: 25

## 2018-09-12 RX ORDER — IPRATROPIUM/ALBUTEROL SULFATE 18-103MCG
3 AEROSOL WITH ADAPTER (GRAM) INHALATION ONCE
Qty: 0 | Refills: 0 | Status: COMPLETED | OUTPATIENT
Start: 2018-09-12 | End: 2018-09-12

## 2018-09-12 RX ORDER — EPINEPHRINE 0.3 MG/.3ML
3 INJECTION INTRAMUSCULAR; SUBCUTANEOUS
Qty: 30 | Refills: 0 | OUTPATIENT
Start: 2018-09-12 | End: 2018-09-25

## 2018-09-12 RX ADMIN — Medication 3 MILLILITER(S): at 01:31

## 2018-09-12 NOTE — ED PROVIDER NOTE - NS ED ROS FT
Patient denied nausea, vomiting, odynophagia, abdominal pain, constipation, diarrhea, suad-rectal pain, rash, fatigue, headache, depression

## 2018-09-12 NOTE — ED PROVIDER NOTE - PHYSICAL EXAMINATION
· Constitutional        Lying in bed comfortably. No acute distress  · HEENT	               PERRL; EOMI; conjunctiva clear, rhinorrhea present, no TTP on facial palpation   · Neck	               Supple; no JVD  · Back	                ROM intact  · Respiratory            good air movement; no rales; no rhonchi; no wheezes  · Cardiovascular       S1 & S2 with RRR; no murmurs, rales or JVD  · Gastrointestinal     soft; no distention; bowel sounds normal; no rigidity  · Extremities             no pedal edema  · Vascular	               Radial Pulse: right normal; left normal  · Neurological           alert and oriented x 3; responds to verbal commands; sensation intact; cranial nerves intact; strength normal  · Skin	               warm and dry  · Musculoskeletal    no calf tenderness; diminished strength  · Psychiatric              normal mood and affect

## 2018-09-12 NOTE — ED ADULT NURSE REASSESSMENT NOTE - NS ED NURSE REASSESS COMMENT FT1
Patient declines chest XRAY.  Patient states "I already know its due to my bronchitis, there is no need". Patient declines chest XRAY.  Patient states "I already know its due to my bronchitis, there is no need".  ED MD Sandy aware of this.

## 2018-09-12 NOTE — ED PROVIDER NOTE - ATTENDING CONTRIBUTION TO CARE
47 yo F pmh nephrotic syndrome, htn recently tx for URI with zpack presents for sob x1 day. no cp, cough, palpitations fever or chills. occasion at rest. no new swelling. no recent sx, immobilization.nasal congestion.  not hypoxic.    GEN: no acute respiratory distress. nontoxic, speaking comfortably in full sentences, ambulating with steady gait.  HEENT: NCAT. face symmetrical. PERRL 4mm, EOMI,  MMM, oropharynx wnl.  Neck: no JVD, trachea midline, no LAD  CV: RRR. +S1S2, no murmur. 2+ pulses in 4 extremities,   Chest: CTA B/l. no wheezing, rales, rhonchi. no retractions. good air movement. no tenderness. no rash or ecchymosis  ABD: +BS, soft, non distended, non tender.   MSK: No clubbing, cyanosis, edema. FROM of all extremities.   Neuro: AOOX3.  CN 2-12 intact; Sensation intact, motor 5/5 throughout. no ataxia    sob. suspect URI. pt feeling better after RITA. pt declining additional w/u and states will f/u with pcp. elevated BP in typical range for her per pt and without any symptoms. she agrees to f/u with pcp for bp control as well. Return precautions were discussed with patient and  at bedside and patient expressed understanding.

## 2018-09-12 NOTE — ED ADULT NURSE REASSESSMENT NOTE - NS ED NURSE REASSESS COMMENT FT1
Patient states she missed a dose of home labetolol.  ED Attending Navya aware of patient's manual BP taken.  Patient denies CP, numbness, tingling or HA.  Patient states she feels fine.  Patient oked to be discharged by ED Attending Navya.

## 2018-09-12 NOTE — ED PROVIDER NOTE - MEDICAL DECISION MAKING DETAILS
48F with hx of nephrotic syndrome presenting with SOB, likely URI, patient declining CXRY improved with nebulizer, rx for albuterol nebulizer

## 2018-09-12 NOTE — ED ADULT NURSE NOTE - OBJECTIVE STATEMENT
Patient with history of HTN (labetolol) presents with c/o SOB with recent completion of z-maki for bronchitis.  +sick contacts at home, patient states she has nebulizer at home but did not have any neb treatments at home.  Patient denies CP, lightheadedness, dizziness.  She does not appear to be in acute distress, arrives on the unit drinking tea.  Steady gait noted, has declined radiology stating "There is no point, I know what it is".  Patient does not have pulmonologist.

## 2018-09-12 NOTE — ED ADULT NURSE NOTE - NSIMPLEMENTINTERV_GEN_ALL_ED
Implemented All Universal Safety Interventions:  [place] to call system. Call bell, personal items and telephone within reach. Instruct patient to call for assistance. Room bathroom lighting operational. Non-slip footwear when patient is off stretcher. Physically safe environment: no spills, clutter or unnecessary equipment. Stretcher in lowest position, wheels locked, appropriate side rails in place.

## 2018-09-12 NOTE — ED PROVIDER NOTE - OBJECTIVE STATEMENT
48F with hx of nephrotic syndrome (not on steroids), Hepatitis B S/P treatment and HTN, presenting with shortness of breath x 1 day. Patient reports it initially started with rhinorrhea, facial fullness, throat pain x 10 days. She was prescribed a Z-pack by her PCP and completed x 5 days however with minimal relief. Patient now endorsing difficulty breathing, pain upon deep inspiration, and shortness of breath. Patient denies chest pain, abdominal pain.  No travel hx. No sick contacts. Patient denies fevers, shakes, chills. Home meds include labetolol 300 BID. Of note, former smoker 20PPD

## 2018-09-13 ENCOUNTER — APPOINTMENT (OUTPATIENT)
Dept: ULTRASOUND IMAGING | Facility: IMAGING CENTER | Age: 48
End: 2018-09-13
Payer: COMMERCIAL

## 2018-09-13 ENCOUNTER — APPOINTMENT (OUTPATIENT)
Dept: HEPATOLOGY | Facility: CLINIC | Age: 48
End: 2018-09-13

## 2018-09-13 ENCOUNTER — OUTPATIENT (OUTPATIENT)
Dept: OUTPATIENT SERVICES | Facility: HOSPITAL | Age: 48
LOS: 1 days | End: 2018-09-13
Payer: COMMERCIAL

## 2018-09-13 DIAGNOSIS — Z00.8 ENCOUNTER FOR OTHER GENERAL EXAMINATION: ICD-10-CM

## 2018-09-13 PROCEDURE — 76770 US EXAM ABDO BACK WALL COMP: CPT | Mod: 26

## 2018-09-13 PROCEDURE — 76770 US EXAM ABDO BACK WALL COMP: CPT

## 2018-10-12 ENCOUNTER — APPOINTMENT (OUTPATIENT)
Dept: NEPHROLOGY | Facility: CLINIC | Age: 48
End: 2018-10-12
Payer: COMMERCIAL

## 2018-10-12 VITALS
BODY MASS INDEX: 35.99 KG/M2 | HEART RATE: 67 BPM | WEIGHT: 229.28 LBS | OXYGEN SATURATION: 98 % | HEIGHT: 67 IN | DIASTOLIC BLOOD PRESSURE: 98 MMHG | SYSTOLIC BLOOD PRESSURE: 158 MMHG

## 2018-10-12 PROCEDURE — 99245 OFF/OP CONSLTJ NEW/EST HI 55: CPT

## 2018-10-15 ENCOUNTER — INPATIENT (INPATIENT)
Facility: HOSPITAL | Age: 48
LOS: 4 days | Discharge: HOME | DRG: 674 | End: 2018-10-20
Attending: INTERNAL MEDICINE | Admitting: INTERNAL MEDICINE
Payer: COMMERCIAL

## 2018-10-15 VITALS
SYSTOLIC BLOOD PRESSURE: 150 MMHG | RESPIRATION RATE: 18 BRPM | WEIGHT: 229.06 LBS | OXYGEN SATURATION: 96 % | HEART RATE: 64 BPM | TEMPERATURE: 98 F | HEIGHT: 67 IN | DIASTOLIC BLOOD PRESSURE: 97 MMHG

## 2018-10-15 DIAGNOSIS — I10 ESSENTIAL (PRIMARY) HYPERTENSION: ICD-10-CM

## 2018-10-15 DIAGNOSIS — N19 UNSPECIFIED KIDNEY FAILURE: ICD-10-CM

## 2018-10-15 DIAGNOSIS — N18.9 CHRONIC KIDNEY DISEASE, UNSPECIFIED: ICD-10-CM

## 2018-10-15 DIAGNOSIS — N18.6 END STAGE RENAL DISEASE: ICD-10-CM

## 2018-10-15 LAB
ALBUMIN SERPL ELPH-MCNC: 3.5 G/DL — SIGNIFICANT CHANGE UP (ref 3.3–5)
ALP SERPL-CCNC: 50 U/L — SIGNIFICANT CHANGE UP (ref 40–120)
ALT FLD-CCNC: 12 U/L — SIGNIFICANT CHANGE UP (ref 10–45)
ANION GAP SERPL CALC-SCNC: 15 MMOL/L — SIGNIFICANT CHANGE UP (ref 5–17)
APPEARANCE UR: CLEAR — SIGNIFICANT CHANGE UP
APTT BLD: 26.9 SEC — LOW (ref 27.5–37.4)
AST SERPL-CCNC: 15 U/L — SIGNIFICANT CHANGE UP (ref 10–40)
BACTERIA # UR AUTO: ABNORMAL
BASOPHILS # BLD AUTO: 0.1 K/UL — SIGNIFICANT CHANGE UP (ref 0–0.2)
BASOPHILS NFR BLD AUTO: 0.9 % — SIGNIFICANT CHANGE UP (ref 0–2)
BILIRUB SERPL-MCNC: 0.4 MG/DL — SIGNIFICANT CHANGE UP (ref 0.2–1.2)
BILIRUB UR-MCNC: NEGATIVE — SIGNIFICANT CHANGE UP
BUN SERPL-MCNC: 67 MG/DL — HIGH (ref 7–23)
CALCIUM SERPL-MCNC: 8.9 MG/DL — SIGNIFICANT CHANGE UP (ref 8.4–10.5)
CHLORIDE SERPL-SCNC: 107 MMOL/L — SIGNIFICANT CHANGE UP (ref 96–108)
CO2 SERPL-SCNC: 15 MMOL/L — LOW (ref 22–31)
COLOR SPEC: SIGNIFICANT CHANGE UP
CREAT ?TM UR-MCNC: 64 MG/DL — SIGNIFICANT CHANGE UP
CREAT SERPL-MCNC: 6.93 MG/DL — HIGH (ref 0.5–1.3)
DIFF PNL FLD: ABNORMAL
EOSINOPHIL # BLD AUTO: 0.7 K/UL — HIGH (ref 0–0.5)
EOSINOPHIL NFR BLD AUTO: 9.9 % — HIGH (ref 0–6)
EPI CELLS # UR: 3 /HPF — SIGNIFICANT CHANGE UP
GLUCOSE SERPL-MCNC: 82 MG/DL — SIGNIFICANT CHANGE UP (ref 70–99)
GLUCOSE UR QL: ABNORMAL
HCG SERPL-ACNC: <2 MIU/ML — SIGNIFICANT CHANGE UP
HCT VFR BLD CALC: 28.5 % — LOW (ref 34.5–45)
HGB BLD-MCNC: 9.8 G/DL — LOW (ref 11.5–15.5)
HYALINE CASTS # UR AUTO: 3 /LPF — HIGH (ref 0–2)
INR BLD: 0.95 RATIO — SIGNIFICANT CHANGE UP (ref 0.88–1.16)
KETONES UR-MCNC: NEGATIVE — SIGNIFICANT CHANGE UP
LEUKOCYTE ESTERASE UR-ACNC: NEGATIVE — SIGNIFICANT CHANGE UP
LYMPHOCYTES # BLD AUTO: 1.7 K/UL — SIGNIFICANT CHANGE UP (ref 1–3.3)
LYMPHOCYTES # BLD AUTO: 24.6 % — SIGNIFICANT CHANGE UP (ref 13–44)
MAGNESIUM SERPL-MCNC: 1.9 MG/DL — SIGNIFICANT CHANGE UP (ref 1.6–2.6)
MCHC RBC-ENTMCNC: 30.5 PG — SIGNIFICANT CHANGE UP (ref 27–34)
MCHC RBC-ENTMCNC: 34.2 GM/DL — SIGNIFICANT CHANGE UP (ref 32–36)
MCV RBC AUTO: 89 FL — SIGNIFICANT CHANGE UP (ref 80–100)
MONOCYTES # BLD AUTO: 0.4 K/UL — SIGNIFICANT CHANGE UP (ref 0–0.9)
MONOCYTES NFR BLD AUTO: 6.1 % — SIGNIFICANT CHANGE UP (ref 2–14)
NEUTROPHILS # BLD AUTO: 4 K/UL — SIGNIFICANT CHANGE UP (ref 1.8–7.4)
NEUTROPHILS NFR BLD AUTO: 58.5 % — SIGNIFICANT CHANGE UP (ref 43–77)
NITRITE UR-MCNC: NEGATIVE — SIGNIFICANT CHANGE UP
OSMOLALITY UR: 331 MOS/KG — SIGNIFICANT CHANGE UP (ref 300–900)
PH UR: 6.5 — SIGNIFICANT CHANGE UP (ref 5–8)
PHOSPHATE SERPL-MCNC: 6.2 MG/DL — HIGH (ref 2.5–4.5)
PLATELET # BLD AUTO: 245 K/UL — SIGNIFICANT CHANGE UP (ref 150–400)
POTASSIUM SERPL-MCNC: 5.2 MMOL/L — SIGNIFICANT CHANGE UP (ref 3.5–5.3)
POTASSIUM SERPL-SCNC: 5.2 MMOL/L — SIGNIFICANT CHANGE UP (ref 3.5–5.3)
PROT ?TM UR-MCNC: 344 MG/DL — HIGH (ref 0–12)
PROT SERPL-MCNC: 7.1 G/DL — SIGNIFICANT CHANGE UP (ref 6–8.3)
PROT UR-MCNC: ABNORMAL
PROT/CREAT UR-RTO: 5.3 RATIO — HIGH (ref 0–0.2)
PROTHROM AB SERPL-ACNC: 10.2 SEC — SIGNIFICANT CHANGE UP (ref 9.8–12.7)
RBC # BLD: 3.2 M/UL — LOW (ref 3.8–5.2)
RBC # FLD: 12.1 % — SIGNIFICANT CHANGE UP (ref 10.3–14.5)
RBC CASTS # UR COMP ASSIST: 6 /HPF — HIGH (ref 0–4)
SODIUM SERPL-SCNC: 137 MMOL/L — SIGNIFICANT CHANGE UP (ref 135–145)
SODIUM UR-SCNC: 62 MMOL/L — SIGNIFICANT CHANGE UP
SP GR SPEC: 1.01 — SIGNIFICANT CHANGE UP (ref 1.01–1.02)
UROBILINOGEN FLD QL: NEGATIVE — SIGNIFICANT CHANGE UP
WBC # BLD: 6.8 K/UL — SIGNIFICANT CHANGE UP (ref 3.8–10.5)
WBC # FLD AUTO: 6.8 K/UL — SIGNIFICANT CHANGE UP (ref 3.8–10.5)
WBC UR QL: 3 /HPF — SIGNIFICANT CHANGE UP (ref 0–5)

## 2018-10-15 PROCEDURE — 93010 ELECTROCARDIOGRAM REPORT: CPT

## 2018-10-15 PROCEDURE — 99285 EMERGENCY DEPT VISIT HI MDM: CPT | Mod: 25

## 2018-10-15 PROCEDURE — 99254 IP/OBS CNSLTJ NEW/EST MOD 60: CPT | Mod: GC

## 2018-10-15 RX ORDER — SODIUM BICARBONATE 1 MEQ/ML
650 SYRINGE (ML) INTRAVENOUS THREE TIMES A DAY
Qty: 0 | Refills: 0 | Status: DISCONTINUED | OUTPATIENT
Start: 2018-10-15 | End: 2018-10-17

## 2018-10-15 RX ORDER — LABETALOL HCL 100 MG
300 TABLET ORAL
Qty: 0 | Refills: 0 | Status: DISCONTINUED | OUTPATIENT
Start: 2018-10-15 | End: 2018-10-17

## 2018-10-15 RX ORDER — INFLUENZA VIRUS VACCINE 15; 15; 15; 15 UG/.5ML; UG/.5ML; UG/.5ML; UG/.5ML
0.5 SUSPENSION INTRAMUSCULAR ONCE
Qty: 0 | Refills: 0 | Status: DISCONTINUED | OUTPATIENT
Start: 2018-10-15 | End: 2018-10-20

## 2018-10-15 RX ORDER — SEVELAMER CARBONATE 2400 MG/1
800 POWDER, FOR SUSPENSION ORAL
Qty: 0 | Refills: 0 | Status: DISCONTINUED | OUTPATIENT
Start: 2018-10-15 | End: 2018-10-17

## 2018-10-15 RX ADMIN — Medication 300 MILLIGRAM(S): at 17:07

## 2018-10-15 NOTE — ED ADULT NURSE REASSESSMENT NOTE - NS ED NURSE REASSESS COMMENT FT1
Code flight called on patient s/p elopement with IV in place. As per Md, pt stated she wanted to get food from [employer].

## 2018-10-15 NOTE — H&P ADULT - ASSESSMENT
48F with PMH of nephrotic syndrome (not on steroids), Hepatitis B S/P treatment and HTN, sent in by her nephrologist for dialysis catheter placement. Patient states she has renal failure 2/2 nephrotic syndrome with last visit to her nephrologist last week with Cr around 7. Was told she could go to the ED that day on Friday but things may be delayed over the weekend thus she presented to ED today. No report of fever, chills, cp, sob, n/v/d, dizziness.    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    anemia  - monitor hgb    hyperphosphatemia  - start phosphate binders    HTN  - cw labetalol

## 2018-10-15 NOTE — ED PROVIDER NOTE - MEDICAL DECISION MAKING DETAILS
48F presenting for nephro eval and admission for dialysis cath placement. Will consult nephro Baldev 48F presenting for nephro eval and admission for dialysis cath placement. Will consult nephro

## 2018-10-15 NOTE — CONSULT NOTE ADULT - SUBJECTIVE AND OBJECTIVE BOX
[facility] DIVISION OF KIDNEY DISEASES AND HYPERTENSION -- INITIAL CONSULT NOTE  --------------------------------------------------------------------------------  HPI: 47 yo F with PMHx of nephrotic syndrome 2/2 membranous nephropathy, hepatitis B s/p treatment, HTN, is admitted for ESRD and initiation of dialysis, hence nephrology consulted.     Patient seen and examined at bedside in the ER, in the company of her . Patient was first diagnosed with membranous nephropathy (PLA2R negative) in  which was confirmed with kidney biopsy. Patient's previous nephrologist was Dr. Price, and is now being followed by Dr. Nunn as an outpatient. Patient was seen by Dr. Nunn on 10/12/18, where the patient was educated on dialysis and the need to commence treatment. Patient was told to come to the ER for admission and further work up. Upon review of [facility], patient's first known Scr was normal on 09, with first elevation in Scr found to be 1.65 on . Patient was found to have an elevated Scr of 2.10 on 6/10/17 and 3.01 on 17. Patient was recently admitted to [facility] on 18, where Scr had increased rapidly to 6.13. Scr on this admission has worsened to 6.93. Patient had duplex renal US on 18 which showed patent IVC and renal veins. Renal US on 18 showed echogenic kidneys.     Patient states that she feels mildly fatigued. She continues to produce urine. Patient is anxious about starting dialysis, and is still undecided on modality (HD vs. PD). Patient denies CP, SOB, N/V/F/C, confusion, limb tremors.    PAST HISTORY  --------------------------------------------------------------------------------  PAST MEDICAL & SURGICAL HISTORY:  GERD (gastroesophageal reflux disease)  Latex allergy  Obesity, Class III, BMI 40-49.9 (morbid obesity)  Endometriosis  HTN (hypertension)  Dyslipidemia  Membranous Glomerulonephropathy  Chronic Hepatitis B  History of Nephrotic Syndrome  Hx of tonsillectomy: as a child  Hx of cholecystectomy  H/O  section x2: ,     FAMILY HISTORY:  No pertinent family history in first degree relatives    PAST SOCIAL HISTORY:    ALLERGIES & MEDICATIONS  --------------------------------------------------------------------------------  Allergies    latex (Unknown)  penicillin (Unknown)    Intolerances      Standing Inpatient Medications    PRN Inpatient Medications      REVIEW OF SYSTEMS  --------------------------------------------------------------------------------  Gen: + lethargy, + fatigue  Respiratory: No dyspnea  CV: No chest pain  GI: No abdominal pain  MSK: No LE edema  Neuro: No dizziness  Heme: No bleeding    All other systems were reviewed and are negative, except as noted.    VITALS/PHYSICAL EXAM  --------------------------------------------------------------------------------  T(C): 36.8 (10-15-18 @ 09:13), Max: 36.8 (10-15-18 @ 09:13)  HR: 67 (10-15-18 @ 09:49) (64 - 67)  BP: 167/92 (10-15-18 @ 09:49) (150/97 - 167/92)  RR: 18 (10-15-18 @ 09:49) (18 - 18)  SpO2: 99% (10-15-18 @ 09:49) (96% - 99%)  Wt(kg): --  Height (cm): 170.18 (10-15-18 @ 09:13)  Weight (kg): 103.9 (10-15-18 @ 09:13)  BMI (kg/m2): 35.9 (10-15-18 @ 09:13)  BSA (m2): 2.14 (10-15-18 @ 09:13)    Physical Exam:  	Gen: NAD, well-appearing  	HEENT: Supple neck  	Pulm: CTA B/L  	CV: RRR, S1S2; no rub  	Abd: +BS, soft, nontender/nondistended  	: No suprapubic tenderness  	LE: No edema  	Skin: Warm, without rashes  	Vascular access: none    LABS/STUDIES  --------------------------------------------------------------------------------              9.8    6.8   >-----------<  245      [10-15-18 @ 10:43]              28.5     137  |  107  |  67  ----------------------------<  82      [10-15-18 @ 10:43]  5.2   |  15  |  6.93        Ca     8.9     [10-15-18 @ 10:43]      Mg     1.9     [10-15-18 @ 10:43]      Phos  6.2     [10-15-18 @ 10:43]    TPro  7.1  /  Alb  3.5  /  TBili  0.4  /  DBili  x   /  AST  15  /  ALT  12  /  AlkPhos  50  [10-15-18 @ 10:43]    PT/INR: PT 10.2 , INR 0.95       [10-15-18 @ 10:43]  PTT: 26.9       [10-15-18 @ 10:43]      Creatinine Trend:  SCr 6.93 [10-15 @ 10:43]    Urinalysis - [10-15-18 @ 12:28]      Color Light Yellow / Appearance Clear / SG 1.012 / pH 6.5      Gluc Trace / Ketone Negative  / Bili Negative / Urobili Negative       Blood Small / Protein 300 mg/dL / Leuk Est Negative / Nitrite Negative      RBC 6 / WBC 3 / Hyaline 3 / Gran  / Sq Epi  / Non Sq Epi 3 / Bacteria Few    Urine Sodium 62      [10-15-18 @ 12:28]  Urine Osmolality 331      [10-15-18 @ 12:28]

## 2018-10-15 NOTE — ED ADULT NURSE NOTE - OBJECTIVE STATEMENT
48 year old female alert and oriented x 4 came to the ED accompanied by  for perma cath placement.  Patient has nephrotic syndrome and was told she needs to start dialysis and her nephrologist sent her to the ED.  Patient denies; chest pain, shortness of breath, N/V, fevers, swelling in the extremities, abdominal distention, pain, burning or frequency with urination.  Patient said she is urinating well.  Bed in lowest position, blankets and socks provided.

## 2018-10-15 NOTE — CONSULT NOTE ADULT - PROBLEM SELECTOR RECOMMENDATION 2
BP above acceptable range. Uptitrate dosages of BP medications as needed to control BP. Monitor BP on current BP medication. Low salt diet BP above acceptable range. Up-titrate dosages of BP medications as needed to control BP. Monitor BP on current BP medication. Low salt diet

## 2018-10-15 NOTE — CONSULT NOTE ADULT - PROBLEM SELECTOR RECOMMENDATION 4
Serum phosphorus elevated at 6.2. Start patient on phosphate binders with meals. Low phosphorus diet. Monitor serum phosphorus

## 2018-10-15 NOTE — CONSULT NOTE ADULT - ASSESSMENT
ASSESSMENT: Patient is a 48F w/ PMHx as noted, vascular surgery consulted for AVF placement this admission, currently stable    PLAN:   -   -   -   -   - Plan discussed with Attending, Dr. Octavio Carrillo PGY-2  Vascular [id number]    **FULL NOTE TO COME** ASSESSMENT: Patient is a 48F w/ PMHx as noted, vascular surgery consulted for AVF placement this admission, currently stable    PLAN:   - LUE precautions, no IVs, BP cuff, or blood draws  - Please document Medical and cardiac clearance  - Currently on schedule for this Wed, 10/17/18, with Dr. Cunningham  - Plan discussed with Attending, Dr. Cunningham  - will continue to follow    MATTHIAS Carrillo PGY-2  Vascular [id number] ASSESSMENT: Patient is a 48F w/ PMHx as noted, vascular surgery consulted for AVF placement this admission, currently stable    PLAN:   - f/u bilateral vein mapping   - LUE precautions, no IVs, BP cuff, or blood draws  - Please document Medical and cardiac clearance  - Currently on schedule for this Wed, 10/17/18, with Dr. Cunningham  - Plan discussed with Attending, Dr. Cunningham  - will continue to follow    MATTHIAS Carrillo PGY-2  Vascular [phone]

## 2018-10-15 NOTE — CONSULT NOTE ADULT - ASSESSMENT
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

## 2018-10-15 NOTE — ED ADULT TRIAGE NOTE - CHIEF COMPLAINT QUOTE
Pt has idiopathic kidney failure. Sent in by Renal MD Nunn to see MD Davies to discuss starting Dialysis.  Pt c/o worsening TAMEZ x 1 week.  No fevers, chills.

## 2018-10-15 NOTE — ED PROVIDER NOTE - OBJECTIVE STATEMENT
48F with PMH of nephrotic syndrome (not on steroids), Hepatitis B S/P treatment and HTN, sent in by her nephrologist for dialysis catheter placement. Patient states she has renal failure 2/2 nephrotic syndrome with last visit to her nephrologist last week with Cr around 7. Was told she could go to the ED that day on Friday but things may be delayed over the weekend thus she presented to ED today. No report of fever, chills, cp, sob, n/v/d, dizziness. 48F with PMH of nephrotic syndrome (not on steroids), Hepatitis B S/P treatment and HTN, sent in by her nephrologist for dialysis catheter placement. Patient states she has renal failure 2/2 nephrotic syndrome with last visit to her nephrologist last week with Cr around 7. Was told she could go to the ED that day on Friday but things may be delayed over the weekend thus she presented to ED today. No report of fever, chills, cp, sob, n/v/d, dizziness.  Nephrologist: Dr. Nunn (referred patient to ED to be seen by Dr. YAN Davies)

## 2018-10-15 NOTE — CONSULT NOTE ADULT - SUBJECTIVE AND OBJECTIVE BOX
**FULL NOTE TO COME**    VASCULAR SURGERY SERVICE ([facility] - #[phone]) - CONSULT NOTE  --------------------------------------------------------------------------------------------    Patient is a 48y old  Female who presents with a chief complaint of uremia (15 Oct 2018 14:10)      HPI:   48F with PMH of nephrotic syndrome (not on steroids), Hepatitis B S/P treatment and HTN, sent in by her nephrologist for dialysis catheter placement. Patient states she has renal failure 2/2 nephrotic syndrome with last visit to her nephrologist last week with Cr around 7. Was told she could go to the ED that day on Friday but things may be delayed over the weekend thus she presented to ED today. ( 15 Oct 2018 14:43)              ROS: 10-system review is otherwise negative except HPI above.      PAST MEDICAL & SURGICAL HISTORY:  GERD (gastroesophageal reflux disease)  Latex allergy  Obesity, Class III, BMI 40-49.9 (morbid obesity)  Endometriosis  HTN (hypertension)  Dyslipidemia  Membranous Glomerulonephropathy  Chronic Hepatitis B  History of Nephrotic Syndrome  Hx of tonsillectomy: as a child  Hx of cholecystectomy  H/O  section x2: 2003    FAMILY HISTORY:  No pertinent family history in first degree relatives        SOCIAL HISTORY:      ALLERGIES: latex (Unknown)  penicillin (Unknown)      HOME MEDICATIONS:       CURRENT MEDICATIONS  MEDICATIONS (STANDING): labetalol 300 milliGRAM(s) Oral two times a day  sevelamer hydrochloride 800 milliGRAM(s) Oral three times a day with meals  sodium bicarbonate 650 milliGRAM(s) Oral three times a day    MEDICATIONS (PRN):  --------------------------------------------------------------------------------------------    Vitals:   T(C): 36.8 (10-15-18 @ 09:13), Max: 36.8 (10-15-18 @ 09:13)  HR: 67 (10-15-18 @ 09:49) (64 - 67)  BP: 167/92 (10-15-18 @ 09:49) (150/97 - 167/92)  RR: 18 (10-15-18 @ 09:49) (18 - 18)  SpO2: 99% (10-15-18 @ 09:49) (96% - 99%)  CAPILLARY BLOOD GLUCOSE        CAPILLARY BLOOD GLUCOSE          Height (cm): 170.18 (10-15 @ 09:13)  Weight (kg): 103.9 (10-15 @ 09:13)  BMI (kg/m2): 35.9 (10-15 @ 09:)  BSA (m2): 2.14 (10-15 @ 09:)    PHYSICAL EXAM:   General: NAD  Cardiac: RRR on monitor   Respiratory: Breathing comfortably on RA  Abdomen: Soft, non-distended, non-tender   Ext: palp radial b/l UE, b/l DP palp in Lower Extrem.       --------------------------------------------------------------------------------------------    LABS  CBC (10-15 @ 10:43)                              9.8<L>                         6.8     )----------------(  245        58.5  % Neutrophils, 24.6  % Lymphocytes, ANC: 4.0                                 28.5<L>    BMP (10-15 @ 10:43)             137     |  107     |  67<H> 		Ca++ --      Ca 8.9                ---------------------------------( 82    		Mg 1.9                5.2     |  15<L>   |  6.93<H>			Ph 6.2<H>    LFTs (10-15 @ 10:43)      TPro 7.1 / Alb 3.5 / TBili 0.4 / DBili -- / AST 15 / ALT 12 / AlkPhos 50    Coags (10-15 @ 10:43)  aPTT 26.9<L> / INR 0.95 / PT 10.2          --------------------------------------------------------------------------------------------    MICROBIOLOGY  Urinalysis (10-15 @ 12:28):     Color: Light Yellow / Appearance: Clear / S.012 / pH: 6.5 / Gluc: Trace<!> / Ketones: Negative / Bili: Negative / Urobili: Negative / Protein :300 mg/dL<!> / Nitrites: Negative / Leuk.Est: Negative / RBC: 6<H> / WBC: 3 / Sq Epi:  / Non Sq Epi: 3 / Bacteria Few<!>         --------------------------------------------------------------------------------------------    IMAGING VASCULAR SURGERY SERVICE ([facility] - #[phone]) - CONSULT NOTE  --------------------------------------------------------------------------------------------  Patient is a 48y old  Female who presents with a chief complaint of uremia (15 Oct 2018 14:10)      HPI:   48F with PMH of nephrotic syndrome (not on steroids), Hepatitis B S/P treatment and HTN, sent in by her nephrologist for dialysis catheter placement. Patient states she has renal failure 2/2 nephrotic syndrome with last visit to her nephrologist last week with Cr around 7. Was told she could go to the ED that day on Friday but things may be delayed over the weekend thus she presented to ED today. ( 15 Oct 2018 14:43)    Pt seen and examined in emergency room. Sitting upright otherwise comfortable , states her doctor told her to come into the hospital so she presented today. Otherwise, denies any feeling of fever, chills, CP , SOB or uncontrolled pain. Pt states she is right hand dominant. Vascular surgery consulted for AVF placement given admission for renal failure, patient known to Dr. Cunningham.     ROS: 10-system review is otherwise negative except HPI above.      PAST MEDICAL & SURGICAL HISTORY:  GERD (gastroesophageal reflux disease)  Latex allergy  Obesity, Class III, BMI 40-49.9 (morbid obesity)  Endometriosis  HTN (hypertension)  Dyslipidemia  Membranous Glomerulonephropathy  Chronic Hepatitis B  History of Nephrotic Syndrome  Hx of tonsillectomy: as a child  Hx of cholecystectomy  H/O  section x2: ,     FAMILY HISTORY:  No pertinent family history in first degree relatives        SOCIAL HISTORY:      ALLERGIES: latex (Unknown)  penicillin (Unknown)      HOME MEDICATIONS:       CURRENT MEDICATIONS  MEDICATIONS (STANDING): labetalol 300 milliGRAM(s) Oral two times a day  sevelamer hydrochloride 800 milliGRAM(s) Oral three times a day with meals  sodium bicarbonate 650 milliGRAM(s) Oral three times a day    MEDICATIONS (PRN):  --------------------------------------------------------------------------------------------    Vitals:   T(C): 36.8 (10-15-18 @ 09:13), Max: 36.8 (10-15-18 @ 09:13)  HR: 67 (10-15-18 @ 09:49) (64 - 67)  BP: 167/92 (10-15-18 @ 09:49) (150/97 - 167/92)  RR: 18 (10-15-18 @ 09:49) (18 - 18)  SpO2: 99% (10-15-18 @ 09:49) (96% - 99%)  CAPILLARY BLOOD GLUCOSE        CAPILLARY BLOOD GLUCOSE          Height (cm): 170.18 (10-15 @ 09:13)  Weight (kg): 103.9 (10-15 @ 09:13)  BMI (kg/m2): 35.9 (10-15 @ 09:13)  BSA (m2): 2.14 (10-15 @ 09:13)    PHYSICAL EXAM:   General: NAD  Respiratory: Breathing comfortably on RA  Abdomen: Soft, non-distended, non-tender   Ext: palp radial b/l UE, b/l DP palp in Lower Extrem.       --------------------------------------------------------------------------------------------    LABS  CBC (10-15 @ 10:43)                              9.8<L>                         6.8     )----------------(  245        58.5  % Neutrophils, 24.6  % Lymphocytes, ANC: 4.0                                 28.5<L>    BMP (10-15 @ 10:43)             137     |  107     |  67<H> 		Ca++ --      Ca 8.9                ---------------------------------( 82    		Mg 1.9                5.2     |  15<L>   |  6.93<H>			Ph 6.2<H>    LFTs (10-15 @ 10:43)      TPro 7.1 / Alb 3.5 / TBili 0.4 / DBili -- / AST 15 / ALT 12 / AlkPhos 50    Coags (10-15 @ 10:43)  aPTT 26.9<L> / INR 0.95 / PT 10.2          --------------------------------------------------------------------------------------------    MICROBIOLOGY  Urinalysis (10-15 @ 12:28):     Color: Light Yellow / Appearance: Clear / S.012 / pH: 6.5 / Gluc: Trace<!> / Ketones: Negative / Bili: Negative / Urobili: Negative / Protein :300 mg/dL<!> / Nitrites: Negative / Leuk.Est: Negative / RBC: 6<H> / WBC: 3 / Sq Epi:  / Non Sq Epi: 3 / Bacteria Few<!>         --------------------------------------------------------------------------------------------    IMAGING

## 2018-10-15 NOTE — H&P ADULT - HISTORY OF PRESENT ILLNESS
48F with PMH of nephrotic syndrome (not on steroids), Hepatitis B S/P treatment and HTN, sent in by her nephrologist for dialysis catheter placement. Patient states she has renal failure 2/2 nephrotic syndrome with last visit to her nephrologist last week with Cr around 7. Was told she could go to the ED that day on Friday but things may be delayed over the weekend thus she presented to ED today. No report of fever, chills, cp, sob, n/v/d, dizziness.

## 2018-10-15 NOTE — H&P ADULT - NSHPLABSRESULTS_GEN_ALL_CORE
LABS:                        9.8    6.8   )-----------( 245      ( 15 Oct 2018 10:43 )             28.5     10-15    137  |  107  |  67<H>  ----------------------------<  82  5.2   |  15<L>  |  6.93<H>    Ca    8.9      15 Oct 2018 10:43  Phos  6.2     10-15  Mg     1.9     10-15    TPro  7.1  /  Alb  3.5  /  TBili  0.4  /  DBili  x   /  AST  15  /  ALT  12  /  AlkPhos  50  10-15    PT/INR - ( 15 Oct 2018 10:43 )   PT: 10.2 sec;   INR: 0.95 ratio         PTT - ( 15 Oct 2018 10:43 )  PTT:26.9 sec  Urinalysis Basic - ( 15 Oct 2018 12:28 )    Color: Light Yellow / Appearance: Clear / S.012 / pH: x  Gluc: x / Ketone: Negative  / Bili: Negative / Urobili: Negative   Blood: x / Protein: 300 mg/dL / Nitrite: Negative   Leuk Esterase: Negative / RBC: 6 /hpf / WBC 3 /hpf   Sq Epi: x / Non Sq Epi: 3 /hpf / Bacteria: Few            RADIOLOGY & ADDITIONAL TESTS:

## 2018-10-15 NOTE — ED ADULT NURSE NOTE - NSIMPLEMENTINTERV_GEN_ALL_ED
Implemented All Universal Safety Interventions:  Tram to call system. Call bell, personal items and telephone within reach. Instruct patient to call for assistance. Room bathroom lighting operational. Non-slip footwear when patient is off stretcher. Physically safe environment: no spills, clutter or unnecessary equipment. Stretcher in lowest position, wheels locked, appropriate side rails in place.

## 2018-10-15 NOTE — CONSULT NOTE ADULT - PROBLEM SELECTOR RECOMMENDATION 9
Patient with ESRD in the setting of membranous nephropathy. Patient will eventually need to undergo RRT. Patient requesting further information on dialysis modalities. Labs reviewed. Pt. clinically stable. Continue to monitor renal function and BMP. If renal function continues to worsen or sequelae of worsening renal function arise, patient may require placement on non-tunneled HD catheter for RRT. Patient is tentatively scheduled for IJ tunneled HD catheter to be placed, and creation of AVF, with vascular on 10/17/18

## 2018-10-16 ENCOUNTER — TRANSCRIPTION ENCOUNTER (OUTPATIENT)
Age: 48
End: 2018-10-16

## 2018-10-16 DIAGNOSIS — E87.5 HYPERKALEMIA: ICD-10-CM

## 2018-10-16 DIAGNOSIS — E87.2 ACIDOSIS: ICD-10-CM

## 2018-10-16 LAB
ANION GAP SERPL CALC-SCNC: 13 MMOL/L — SIGNIFICANT CHANGE UP (ref 5–17)
APTT BLD: 30.6 SEC — SIGNIFICANT CHANGE UP (ref 27.5–37.4)
BLD GP AB SCN SERPL QL: NEGATIVE — SIGNIFICANT CHANGE UP
BUN SERPL-MCNC: 66 MG/DL — HIGH (ref 7–23)
CALCIUM SERPL-MCNC: 8.9 MG/DL — SIGNIFICANT CHANGE UP (ref 8.4–10.5)
CHLORIDE SERPL-SCNC: 110 MMOL/L — HIGH (ref 96–108)
CO2 SERPL-SCNC: 15 MMOL/L — LOW (ref 22–31)
CREAT SERPL-MCNC: 6.86 MG/DL — HIGH (ref 0.5–1.3)
FERRITIN SERPL-MCNC: 177 NG/ML — HIGH (ref 15–150)
GLUCOSE SERPL-MCNC: 91 MG/DL — SIGNIFICANT CHANGE UP (ref 70–99)
HBV CORE AB SER-ACNC: REACTIVE
HBV SURFACE AB SER-ACNC: <3 MIU/ML — LOW
HBV SURFACE AB SER-ACNC: SIGNIFICANT CHANGE UP
HCT VFR BLD CALC: 26.2 % — LOW (ref 34.5–45)
HCV AB S/CO SERPL IA: 0.15 S/CO — SIGNIFICANT CHANGE UP
HCV AB SERPL-IMP: SIGNIFICANT CHANGE UP
HGB BLD-MCNC: 8.9 G/DL — LOW (ref 11.5–15.5)
INR BLD: 0.98 RATIO — SIGNIFICANT CHANGE UP (ref 0.88–1.16)
IRON SATN MFR SERPL: 23 % — SIGNIFICANT CHANGE UP (ref 14–50)
IRON SATN MFR SERPL: 58 UG/DL — SIGNIFICANT CHANGE UP (ref 30–160)
IRON SATN MFR SERPL: 69 UG/DL — SIGNIFICANT CHANGE UP (ref 30–160)
MCHC RBC-ENTMCNC: 30.2 PG — SIGNIFICANT CHANGE UP (ref 27–34)
MCHC RBC-ENTMCNC: 34 GM/DL — SIGNIFICANT CHANGE UP (ref 32–36)
MCV RBC AUTO: 88.8 FL — SIGNIFICANT CHANGE UP (ref 80–100)
PLATELET # BLD AUTO: 247 K/UL — SIGNIFICANT CHANGE UP (ref 150–400)
POTASSIUM SERPL-MCNC: 5.7 MMOL/L — HIGH (ref 3.5–5.3)
POTASSIUM SERPL-SCNC: 5.7 MMOL/L — HIGH (ref 3.5–5.3)
PROTHROM AB SERPL-ACNC: 10.7 SEC — SIGNIFICANT CHANGE UP (ref 9.8–12.7)
RBC # BLD: 2.95 M/UL — LOW (ref 3.8–5.2)
RBC # FLD: 13.9 % — SIGNIFICANT CHANGE UP (ref 10.3–14.5)
RH IG SCN BLD-IMP: POSITIVE — SIGNIFICANT CHANGE UP
SODIUM SERPL-SCNC: 138 MMOL/L — SIGNIFICANT CHANGE UP (ref 135–145)
TIBC SERPL-MCNC: 252 UG/DL — SIGNIFICANT CHANGE UP (ref 220–430)
UIBC SERPL-MCNC: 194 UG/DL — SIGNIFICANT CHANGE UP (ref 110–370)
WBC # BLD: 6.61 K/UL — SIGNIFICANT CHANGE UP (ref 3.8–10.5)
WBC # FLD AUTO: 6.61 K/UL — SIGNIFICANT CHANGE UP (ref 3.8–10.5)

## 2018-10-16 PROCEDURE — 99232 SBSQ HOSP IP/OBS MODERATE 35: CPT | Mod: GC

## 2018-10-16 PROCEDURE — 93971 EXTREMITY STUDY: CPT | Mod: 26

## 2018-10-16 RX ORDER — HYDRALAZINE HCL 50 MG
10 TABLET ORAL ONCE
Qty: 0 | Refills: 0 | Status: COMPLETED | OUTPATIENT
Start: 2018-10-16 | End: 2018-10-16

## 2018-10-16 RX ADMIN — Medication 650 MILLIGRAM(S): at 06:22

## 2018-10-16 RX ADMIN — Medication 300 MILLIGRAM(S): at 17:41

## 2018-10-16 RX ADMIN — Medication 650 MILLIGRAM(S): at 21:06

## 2018-10-16 RX ADMIN — Medication 650 MILLIGRAM(S): at 14:19

## 2018-10-16 RX ADMIN — SEVELAMER CARBONATE 800 MILLIGRAM(S): 2400 POWDER, FOR SUSPENSION ORAL at 17:41

## 2018-10-16 RX ADMIN — Medication 10 MILLIGRAM(S): at 14:52

## 2018-10-16 RX ADMIN — SEVELAMER CARBONATE 800 MILLIGRAM(S): 2400 POWDER, FOR SUSPENSION ORAL at 11:40

## 2018-10-16 RX ADMIN — Medication 300 MILLIGRAM(S): at 06:22

## 2018-10-16 NOTE — CONSULT NOTE ADULT - ATTENDING COMMENTS
patient opting for hemodialysis and would also like to be evaluated for kidney transplant. will give apt in transplant center post discharge.
Patient was seen and examined  Patient currently with worsening renal function now awaiting institution of hemodialysis.  Patient is right-handed.  Will schedule for permacath placement along with creation of left arm AV fistula this Wednesday   Vein mapping ordered
I have seen this patient with the fellow and agree with their assessment and plan. In addition, CKDV now uremic and need to start dialysis this admission. Pt is now thinking PD vs HD. D/w with vascular surgeon and planned for AVF and Perm cath on wed but if she wants PD, will defer that to alternate plan to get PD cath placed by transplant surg and start urgent start PD. She will let us know tomorrow AM. there is no urgent need to start dialysis today.    ESRD: awaiting access either PD or HD catheter depending on her decision tomorrow  Hep B hx:- check PCR and to see if needs ID on board for ongoing entacavir  CKDMBD:- start renagel today  Acidosis: start bicarb 650mg TID  Anemia: iron studies sent and await to see if needs venofer    d/w with , Dr Nunn and hospitalist    Dinah Davies MD  Cell   Pager   Office

## 2018-10-16 NOTE — CONSULT NOTE ADULT - ASSESSMENT
48F with renal failure secondary to nephrotic syndrome presented to the hospital after being sent in by her nephrologist for dialysis access.     -will discuss with patient re: which dialysis access she prefers  -also discussed with Dr. Cunningham re: fistula planning 48F with renal failure secondary to nephrotic syndrome presented to the hospital after being sent in by her nephrologist for dialysis access.     -patient opted for hemodialysis over peritoneal dialysis   -plan for AVF and permacath placement tomorrow  -please provide number for transplant center to follow up upon discharge   -patient seen and examined with Dr. Jacobson  -page [phone] with transplant questions

## 2018-10-16 NOTE — PROGRESS NOTE ADULT - PROBLEM SELECTOR PLAN 3
BP above acceptable range. Up-titrate dosages of BP medications as needed to control BP. Monitor BP on current BP medication. Low salt diet

## 2018-10-16 NOTE — CONSULT NOTE ADULT - SUBJECTIVE AND OBJECTIVE BOX
TRANSPLANT SURGERY CONSULTATION    48F with renal failure secondary to nephrotic syndrome presented to the hospital after being sent in by her nephrologist for dialysis access. As per patient has not yet decided whether she would be more interested in hemodialysis or peritoneal dialysis. Is obese, has had a laparoscopic cholecystectomy and two c sections in the past.       HPI:  48F with PMH of nephrotic syndrome (not on steroids), Hepatitis B S/P treatment and HTN, sent in by her nephrologist for dialysis catheter placement. Patient states she has renal failure 2/2 nephrotic syndrome with last visit to her nephrologist last week with Cr around 7. Was told she could go to the ED that day on Friday but things may be delayed over the weekend thus she presented to ED today. No report of fever, chills, cp, sob, n/v/d, dizziness. (15 Oct 2018 14:43)      PAST MEDICAL & SURGICAL HISTORY:  GERD (gastroesophageal reflux disease)  Latex allergy  Obesity, Class III, BMI 40-49.9 (morbid obesity)  Endometriosis  HTN (hypertension)  Dyslipidemia  Membranous Glomerulonephropathy  Chronic Hepatitis B  History of Nephrotic Syndrome  Hx of tonsillectomy: as a child  Hx of cholecystectomy  H/O  section x2: ,       FAMILY HISTORY:  No pertinent family history in first degree relatives      SOCIAL HISTORY:    MEDICATIONS  (STANDING):  influenza   Vaccine 0.5 milliLiter(s) IntraMuscular once  labetalol 300 milliGRAM(s) Oral two times a day  sevelamer hydrochloride 800 milliGRAM(s) Oral three times a day with meals  sodium bicarbonate 650 milliGRAM(s) Oral three times a day    MEDICATIONS  (PRN):    Allergies    latex (Unknown)  penicillin (Unknown)    Intolerances    PHYSICAL EXAM     Vital Signs Last 24 Hrs  T(C): 36.9 (16 Oct 2018 11:43), Max: 37.2 (16 Oct 2018 01:00)  T(F): 98.5 (16 Oct 2018 11:43), Max: 99 (16 Oct 2018 01:00)  HR: 59 (16 Oct 2018 11:48) (56 - 66)  BP: 167/100 (16 Oct 2018 11:48) (140/89 - 178/108)  BP(mean): --  RR: 18 (16 Oct 2018 11:43) (17 - 18)  SpO2: 94% (16 Oct 2018 11:43) (94% - 98%)  Daily     Daily     General: WN/WD NAD  Neurology: A&Ox3, nonfocal, CORONADO x 4  Head:  Normocephalic, atraumatic  ENT:  Mucosa moist, no ulcerations  Neck:  Supple, no sinuses or palpable masses  Lymphatic:  No palpable cervical, supraclavicular, axillary or inguinal adenopathy  Respiratory: CTA B/L  CV: RRR, S1S2, no murmur  Abdominal: Soft, NT, ND no palpable mass  MSK: No edema, + peripheral pulses, FROM all 4 extremity                            8.9    6.61  )-----------( 247      ( 16 Oct 2018 08:13 )             26.2     10-16    138  |  110<H>  |  66<H>  ----------------------------<  91  5.7<H>   |  15<L>  |  6.86<H>    Ca    8.9      16 Oct 2018 06:54  Phos  6.2     10-15  Mg     1.9     10-15    TPro  7.1  /  Alb  3.5  /  TBili  0.4  /  DBili  x   /  AST  15  /  ALT  12  /  AlkPhos  50  10-15    PT/INR - ( 15 Oct 2018 10:43 )   PT: 10.2 sec;   INR: 0.95 ratio         PTT - ( 15 Oct 2018 10:43 )  PTT:26.9 sec  Urinalysis Basic - ( 15 Oct 2018 12:28 )    Color: Light Yellow / Appearance: Clear / S.012 / pH: x  Gluc: x / Ketone: Negative  / Bili: Negative / Urobili: Negative   Blood: x / Protein: 300 mg/dL / Nitrite: Negative   Leuk Esterase: Negative / RBC: 6 /hpf / WBC 3 /hpf   Sq Epi: x / Non Sq Epi: 3 /hpf / Bacteria: Few        IMAGING STUDIES:

## 2018-10-17 ENCOUNTER — APPOINTMENT (OUTPATIENT)
Dept: VASCULAR SURGERY | Facility: HOSPITAL | Age: 48
End: 2018-10-17

## 2018-10-17 LAB
ANION GAP SERPL CALC-SCNC: 14 MMOL/L — SIGNIFICANT CHANGE UP (ref 5–17)
APTT BLD: 30.1 SEC — SIGNIFICANT CHANGE UP (ref 27.5–37.4)
BASOPHILS # BLD AUTO: 0.03 K/UL — SIGNIFICANT CHANGE UP (ref 0–0.2)
BASOPHILS NFR BLD AUTO: 0.4 % — SIGNIFICANT CHANGE UP (ref 0–2)
BUN SERPL-MCNC: 68 MG/DL — HIGH (ref 7–23)
CALCIUM SERPL-MCNC: 8.9 MG/DL — SIGNIFICANT CHANGE UP (ref 8.4–10.5)
CHLORIDE SERPL-SCNC: 107 MMOL/L — SIGNIFICANT CHANGE UP (ref 96–108)
CO2 SERPL-SCNC: 18 MMOL/L — LOW (ref 22–31)
CREAT SERPL-MCNC: 7.56 MG/DL — HIGH (ref 0.5–1.3)
EOSINOPHIL # BLD AUTO: 0.77 K/UL — HIGH (ref 0–0.5)
EOSINOPHIL NFR BLD AUTO: 10.7 % — HIGH (ref 0–6)
GLUCOSE SERPL-MCNC: 83 MG/DL — SIGNIFICANT CHANGE UP (ref 70–99)
HBV DNA # SERPL NAA+PROBE: 5277 IU/ML — SIGNIFICANT CHANGE UP
HBV DNA SERPL NAA+PROBE-LOG#: 3.72 LOGIU/ML — SIGNIFICANT CHANGE UP
HBV E AB SER-ACNC: POSITIVE
HBV E AG SER-ACNC: NEGATIVE — SIGNIFICANT CHANGE UP
HCT VFR BLD CALC: 27.2 % — LOW (ref 34.5–45)
HCT VFR BLD CALC: 29.1 % — LOW (ref 34.5–45)
HGB BLD-MCNC: 8.9 G/DL — LOW (ref 11.5–15.5)
HGB BLD-MCNC: 9.8 G/DL — LOW (ref 11.5–15.5)
IMM GRANULOCYTES NFR BLD AUTO: 0 % — SIGNIFICANT CHANGE UP (ref 0–1.5)
INR BLD: 0.91 RATIO — SIGNIFICANT CHANGE UP (ref 0.88–1.16)
LYMPHOCYTES # BLD AUTO: 1.83 K/UL — SIGNIFICANT CHANGE UP (ref 1–3.3)
LYMPHOCYTES # BLD AUTO: 25.3 % — SIGNIFICANT CHANGE UP (ref 13–44)
MCHC RBC-ENTMCNC: 29.6 PG — SIGNIFICANT CHANGE UP (ref 27–34)
MCHC RBC-ENTMCNC: 29.9 PG — SIGNIFICANT CHANGE UP (ref 27–34)
MCHC RBC-ENTMCNC: 32.7 GM/DL — SIGNIFICANT CHANGE UP (ref 32–36)
MCHC RBC-ENTMCNC: 33.6 GM/DL — SIGNIFICANT CHANGE UP (ref 32–36)
MCV RBC AUTO: 89 FL — SIGNIFICANT CHANGE UP (ref 80–100)
MCV RBC AUTO: 90.4 FL — SIGNIFICANT CHANGE UP (ref 80–100)
MONOCYTES # BLD AUTO: 0.62 K/UL — SIGNIFICANT CHANGE UP (ref 0–0.9)
MONOCYTES NFR BLD AUTO: 8.6 % — SIGNIFICANT CHANGE UP (ref 2–14)
NEUTROPHILS # BLD AUTO: 3.97 K/UL — SIGNIFICANT CHANGE UP (ref 1.8–7.4)
NEUTROPHILS NFR BLD AUTO: 55 % — SIGNIFICANT CHANGE UP (ref 43–77)
PLATELET # BLD AUTO: 234 K/UL — SIGNIFICANT CHANGE UP (ref 150–400)
PLATELET # BLD AUTO: 235 K/UL — SIGNIFICANT CHANGE UP (ref 150–400)
POTASSIUM SERPL-MCNC: 4.7 MMOL/L — SIGNIFICANT CHANGE UP (ref 3.5–5.3)
POTASSIUM SERPL-SCNC: 4.7 MMOL/L — SIGNIFICANT CHANGE UP (ref 3.5–5.3)
PROTHROM AB SERPL-ACNC: 10.3 SEC — SIGNIFICANT CHANGE UP (ref 10–13.1)
RBC # BLD: 3.01 M/UL — LOW (ref 3.8–5.2)
RBC # BLD: 3.26 M/UL — LOW (ref 3.8–5.2)
RBC # FLD: 12.6 % — SIGNIFICANT CHANGE UP (ref 10.3–14.5)
RBC # FLD: 13.8 % — SIGNIFICANT CHANGE UP (ref 10.3–14.5)
SODIUM SERPL-SCNC: 139 MMOL/L — SIGNIFICANT CHANGE UP (ref 135–145)
WBC # BLD: 11.3 K/UL — HIGH (ref 3.8–10.5)
WBC # BLD: 7.22 K/UL — SIGNIFICANT CHANGE UP (ref 3.8–10.5)
WBC # FLD AUTO: 11.3 K/UL — HIGH (ref 3.8–10.5)
WBC # FLD AUTO: 7.22 K/UL — SIGNIFICANT CHANGE UP (ref 3.8–10.5)

## 2018-10-17 PROCEDURE — 99232 SBSQ HOSP IP/OBS MODERATE 35: CPT

## 2018-10-17 PROCEDURE — 71045 X-RAY EXAM CHEST 1 VIEW: CPT | Mod: 26

## 2018-10-17 RX ORDER — ERYTHROPOIETIN 10000 [IU]/ML
4000 INJECTION, SOLUTION INTRAVENOUS; SUBCUTANEOUS
Qty: 0 | Refills: 0 | Status: DISCONTINUED | OUTPATIENT
Start: 2018-10-17 | End: 2018-10-20

## 2018-10-17 RX ORDER — FENTANYL CITRATE 50 UG/ML
50 INJECTION INTRAVENOUS
Qty: 0 | Refills: 0 | Status: DISCONTINUED | OUTPATIENT
Start: 2018-10-17 | End: 2018-10-17

## 2018-10-17 RX ORDER — LABETALOL HCL 100 MG
300 TABLET ORAL
Qty: 0 | Refills: 0 | Status: DISCONTINUED | OUTPATIENT
Start: 2018-10-17 | End: 2018-10-20

## 2018-10-17 RX ORDER — ERYTHROPOIETIN 10000 [IU]/ML
4000 INJECTION, SOLUTION INTRAVENOUS; SUBCUTANEOUS
Qty: 0 | Refills: 0 | Status: DISCONTINUED | OUTPATIENT
Start: 2018-10-17 | End: 2018-10-17

## 2018-10-17 RX ORDER — FENTANYL CITRATE 50 UG/ML
25 INJECTION INTRAVENOUS
Qty: 0 | Refills: 0 | Status: DISCONTINUED | OUTPATIENT
Start: 2018-10-17 | End: 2018-10-17

## 2018-10-17 RX ORDER — ACETAMINOPHEN 500 MG
650 TABLET ORAL EVERY 6 HOURS
Qty: 0 | Refills: 0 | Status: DISCONTINUED | OUTPATIENT
Start: 2018-10-17 | End: 2018-10-20

## 2018-10-17 RX ORDER — SODIUM BICARBONATE 1 MEQ/ML
650 SYRINGE (ML) INTRAVENOUS THREE TIMES A DAY
Qty: 0 | Refills: 0 | Status: DISCONTINUED | OUTPATIENT
Start: 2018-10-17 | End: 2018-10-20

## 2018-10-17 RX ORDER — TUBERCULIN PURIFIED PROTEIN DERIVATIVE 5 [IU]/.1ML
5 INJECTION, SOLUTION INTRADERMAL ONCE
Qty: 0 | Refills: 0 | Status: DISCONTINUED | OUTPATIENT
Start: 2018-10-17 | End: 2018-10-20

## 2018-10-17 RX ORDER — ONDANSETRON 8 MG/1
4 TABLET, FILM COATED ORAL ONCE
Qty: 0 | Refills: 0 | Status: DISCONTINUED | OUTPATIENT
Start: 2018-10-17 | End: 2018-10-17

## 2018-10-17 RX ORDER — SEVELAMER CARBONATE 2400 MG/1
800 POWDER, FOR SUSPENSION ORAL
Qty: 0 | Refills: 0 | Status: DISCONTINUED | OUTPATIENT
Start: 2018-10-17 | End: 2018-10-20

## 2018-10-17 RX ADMIN — Medication 300 MILLIGRAM(S): at 05:26

## 2018-10-17 RX ADMIN — Medication 300 MILLIGRAM(S): at 22:21

## 2018-10-17 NOTE — PROVIDER CONTACT NOTE (EICU) - ASSESSMENT
vital signs checked vss. pt c/o dizziness when getting up. pt states it is relieved when lying down. pt has not ate in a day. pt cannot be put on IVF due to renal disease

## 2018-10-17 NOTE — CHART NOTE - NSCHARTNOTEFT_GEN_A_CORE
POST-OPERATIVE NOTE    Subjective:  Patient is s/p left upper extremity brachial cephalic fistula and right permacath placement. Pain well controlled. Tolerating sips. Denies nausea or vomiting. Reports discomfort at right side of neck from permacath.  Recovering appropriately.     Vital Signs Last 24 Hrs  T(C): 37.1 (17 Oct 2018 22:01), Max: 37.1 (17 Oct 2018 22:01)  T(F): 98.7 (17 Oct 2018 22:01), Max: 98.7 (17 Oct 2018 22:01)  HR: 74 (17 Oct 2018 22:01) (59 - 86)  BP: 160/91 (17 Oct 2018 22:01) (142/83 - 176/108)  BP(mean): 110 (17 Oct 2018 19:15) (105 - 111)  RR: 20 (17 Oct 2018 22:01) (16 - 20)  SpO2: 95% (17 Oct 2018 22:01) (94% - 99%)  I&O's Detail    16 Oct 2018 07:01  -  17 Oct 2018 07:00  --------------------------------------------------------  IN:    Oral Fluid: 940 mL  Total IN: 940 mL    OUT:    Voided: 1175 mL  Total OUT: 1175 mL    Total NET: -235 mL      17 Oct 2018 07:01  -  17 Oct 2018 22:13  --------------------------------------------------------  IN:  Total IN: 0 mL    OUT:  Total OUT: 0 mL    Total NET: 0 mL        labetalol 300    PAST MEDICAL & SURGICAL HISTORY:  GERD (gastroesophageal reflux disease)  Latex allergy  Obesity, Class III, BMI 40-49.9 (morbid obesity)  Endometriosis  HTN (hypertension)  Dyslipidemia  Membranous Glomerulonephropathy  Chronic Hepatitis B  History of Nephrotic Syndrome  Hx of tonsillectomy: as a child  Hx of cholecystectomy  H/O  section x2: ,         Physical Exam:  General: NAD, resting comfortably in bed  Pulmonary: Nonlabored breathing, no respiratory distress  Cardiovascular: NSR  Abdominal: soft, NT/ND  Extremities: WWP, right permacath deressing clean, dry, and intact, left UE dressing serosanguinous fluid on gauze, audible bruit, and palpable radial pulse       LABS:                        8.9    7.22  )-----------( 234      ( 17 Oct 2018 08:58 )             27.2     10-    139  |  107  |  68<H>  ----------------------------<  83  4.7   |  18<L>  |  7.56<H>    Ca    8.9      17 Oct 2018 07:24      PT/INR - ( 17 Oct 2018 08:59 )   PT: 10.3 sec;   INR: 0.91 ratio         PTT - ( 17 Oct 2018 08:59 )  PTT:30.1 sec  CAPILLARY BLOOD GLUCOSE          Radiology and Additional Studies:    Assessment:  The patient is a 48y Female who is now several hours post-op from a     Plan:  - Pain control as needed  - DVT ppx  - OOB and ambulating as tolerated  - F/u AM labs  - dialysis christiano Joshua, PGY-1  [facility] Vascular Surgery

## 2018-10-17 NOTE — PROGRESS NOTE ADULT - PROBLEM SELECTOR PLAN 1
Patient with ESRD in the setting of membranous nephropathy. Patient will be placed on RRT during this hospitalization. Patient requesting further information on dialysis modalities but appears to have selected HD. Labs reviewed. Pt. clinically stable. Continue to monitor renal function and BMP. If renal function continues to worsen or sequelae of worsening renal function arise, patient may require placement of non-tunneled HD catheter for RRT. Pt at AVF and PC today and will do first treatment tomorrow and then Friday. Dc planning after that by weekend for Dialysis at [facility]

## 2018-10-17 NOTE — BRIEF OPERATIVE NOTE - PROCEDURE
<<-----Click on this checkbox to enter Procedure Insertion of permanent hemodialysis catheter with imaging guidance  10/17/2018    Active  MDIFIORE  AV fistula  10/17/2018  left upper extremity brachial cephalic fistula  Active  MDIFIORE

## 2018-10-18 LAB
ALBUMIN SERPL ELPH-MCNC: 3.6 G/DL — SIGNIFICANT CHANGE UP (ref 3.3–5)
ALP SERPL-CCNC: 48 U/L — SIGNIFICANT CHANGE UP (ref 40–120)
ALT FLD-CCNC: 6 U/L — LOW (ref 10–45)
ANION GAP SERPL CALC-SCNC: 12 MMOL/L — SIGNIFICANT CHANGE UP (ref 5–17)
ANION GAP SERPL CALC-SCNC: 13 MMOL/L — SIGNIFICANT CHANGE UP (ref 5–17)
ANION GAP SERPL CALC-SCNC: 13 MMOL/L — SIGNIFICANT CHANGE UP (ref 5–17)
AST SERPL-CCNC: 11 U/L — SIGNIFICANT CHANGE UP (ref 10–40)
BASOPHILS # BLD AUTO: 0.01 K/UL — SIGNIFICANT CHANGE UP (ref 0–0.2)
BASOPHILS NFR BLD AUTO: 0.1 % — SIGNIFICANT CHANGE UP (ref 0–2)
BILIRUB SERPL-MCNC: 0.2 MG/DL — SIGNIFICANT CHANGE UP (ref 0.2–1.2)
BUN SERPL-MCNC: 33 MG/DL — HIGH (ref 7–23)
BUN SERPL-MCNC: 36 MG/DL — HIGH (ref 7–23)
BUN SERPL-MCNC: 68 MG/DL — HIGH (ref 7–23)
CALCIUM SERPL-MCNC: 9 MG/DL — SIGNIFICANT CHANGE UP (ref 8.4–10.5)
CALCIUM SERPL-MCNC: 9.1 MG/DL — SIGNIFICANT CHANGE UP (ref 8.4–10.5)
CALCIUM SERPL-MCNC: 9.1 MG/DL — SIGNIFICANT CHANGE UP (ref 8.4–10.5)
CHLORIDE SERPL-SCNC: 107 MMOL/L — SIGNIFICANT CHANGE UP (ref 96–108)
CHLORIDE SERPL-SCNC: 98 MMOL/L — SIGNIFICANT CHANGE UP (ref 96–108)
CHLORIDE SERPL-SCNC: 99 MMOL/L — SIGNIFICANT CHANGE UP (ref 96–108)
CO2 SERPL-SCNC: 16 MMOL/L — LOW (ref 22–31)
CO2 SERPL-SCNC: 25 MMOL/L — SIGNIFICANT CHANGE UP (ref 22–31)
CO2 SERPL-SCNC: 25 MMOL/L — SIGNIFICANT CHANGE UP (ref 22–31)
CREAT SERPL-MCNC: 4.44 MG/DL — HIGH (ref 0.5–1.3)
CREAT SERPL-MCNC: 4.81 MG/DL — HIGH (ref 0.5–1.3)
CREAT SERPL-MCNC: 7.45 MG/DL — HIGH (ref 0.5–1.3)
EOSINOPHIL # BLD AUTO: 0.01 K/UL — SIGNIFICANT CHANGE UP (ref 0–0.5)
EOSINOPHIL NFR BLD AUTO: 0.1 % — SIGNIFICANT CHANGE UP (ref 0–6)
GLUCOSE SERPL-MCNC: 109 MG/DL — HIGH (ref 70–99)
GLUCOSE SERPL-MCNC: 112 MG/DL — HIGH (ref 70–99)
GLUCOSE SERPL-MCNC: 81 MG/DL — SIGNIFICANT CHANGE UP (ref 70–99)
HCT VFR BLD CALC: 27.8 % — LOW (ref 34.5–45)
HGB BLD-MCNC: 9.3 G/DL — LOW (ref 11.5–15.5)
IMM GRANULOCYTES NFR BLD AUTO: 0.2 % — SIGNIFICANT CHANGE UP (ref 0–1.5)
LYMPHOCYTES # BLD AUTO: 0.8 K/UL — LOW (ref 1–3.3)
LYMPHOCYTES # BLD AUTO: 8.2 % — LOW (ref 13–44)
MAGNESIUM SERPL-MCNC: 2 MG/DL — SIGNIFICANT CHANGE UP (ref 1.6–2.6)
MCHC RBC-ENTMCNC: 30.2 PG — SIGNIFICANT CHANGE UP (ref 27–34)
MCHC RBC-ENTMCNC: 33.5 GM/DL — SIGNIFICANT CHANGE UP (ref 32–36)
MCV RBC AUTO: 90.3 FL — SIGNIFICANT CHANGE UP (ref 80–100)
MONOCYTES # BLD AUTO: 0.39 K/UL — SIGNIFICANT CHANGE UP (ref 0–0.9)
MONOCYTES NFR BLD AUTO: 4 % — SIGNIFICANT CHANGE UP (ref 2–14)
NEUTROPHILS # BLD AUTO: 8.53 K/UL — HIGH (ref 1.8–7.4)
NEUTROPHILS NFR BLD AUTO: 87.4 % — HIGH (ref 43–77)
PHOSPHATE SERPL-MCNC: 5.3 MG/DL — HIGH (ref 2.5–4.5)
PLATELET # BLD AUTO: 221 K/UL — SIGNIFICANT CHANGE UP (ref 150–400)
POTASSIUM SERPL-MCNC: 3.5 MMOL/L — SIGNIFICANT CHANGE UP (ref 3.5–5.3)
POTASSIUM SERPL-MCNC: 3.6 MMOL/L — SIGNIFICANT CHANGE UP (ref 3.5–5.3)
POTASSIUM SERPL-MCNC: 5.5 MMOL/L — HIGH (ref 3.5–5.3)
POTASSIUM SERPL-SCNC: 3.5 MMOL/L — SIGNIFICANT CHANGE UP (ref 3.5–5.3)
POTASSIUM SERPL-SCNC: 3.6 MMOL/L — SIGNIFICANT CHANGE UP (ref 3.5–5.3)
POTASSIUM SERPL-SCNC: 5.5 MMOL/L — HIGH (ref 3.5–5.3)
PROT SERPL-MCNC: 6.5 G/DL — SIGNIFICANT CHANGE UP (ref 6–8.3)
RBC # BLD: 3.08 M/UL — LOW (ref 3.8–5.2)
RBC # FLD: 13.8 % — SIGNIFICANT CHANGE UP (ref 10.3–14.5)
SODIUM SERPL-SCNC: 136 MMOL/L — SIGNIFICANT CHANGE UP (ref 135–145)
TROPONIN T, HIGH SENSITIVITY RESULT: 43 NG/L — SIGNIFICANT CHANGE UP (ref 0–51)
WBC # BLD: 9.76 K/UL — SIGNIFICANT CHANGE UP (ref 3.8–10.5)
WBC # FLD AUTO: 9.76 K/UL — SIGNIFICANT CHANGE UP (ref 3.8–10.5)

## 2018-10-18 PROCEDURE — 93010 ELECTROCARDIOGRAM REPORT: CPT

## 2018-10-18 PROCEDURE — 90935 HEMODIALYSIS ONE EVALUATION: CPT | Mod: GC

## 2018-10-18 RX ORDER — ACETAMINOPHEN 500 MG
1000 TABLET ORAL ONCE
Qty: 0 | Refills: 0 | Status: COMPLETED | OUTPATIENT
Start: 2018-10-18 | End: 2018-10-18

## 2018-10-18 RX ADMIN — Medication 650 MILLIGRAM(S): at 08:10

## 2018-10-18 RX ADMIN — Medication 300 MILLIGRAM(S): at 06:34

## 2018-10-18 RX ADMIN — SEVELAMER CARBONATE 800 MILLIGRAM(S): 2400 POWDER, FOR SUSPENSION ORAL at 17:52

## 2018-10-18 RX ADMIN — Medication 1000 MILLIGRAM(S): at 14:40

## 2018-10-18 RX ADMIN — SEVELAMER CARBONATE 800 MILLIGRAM(S): 2400 POWDER, FOR SUSPENSION ORAL at 14:42

## 2018-10-18 RX ADMIN — Medication 1000 MILLIGRAM(S): at 15:10

## 2018-10-18 RX ADMIN — SEVELAMER CARBONATE 800 MILLIGRAM(S): 2400 POWDER, FOR SUSPENSION ORAL at 09:16

## 2018-10-18 RX ADMIN — ERYTHROPOIETIN 4000 UNIT(S): 10000 INJECTION, SOLUTION INTRAVENOUS; SUBCUTANEOUS at 10:43

## 2018-10-18 NOTE — PROGRESS NOTE ADULT - PROBLEM SELECTOR PLAN 3
BP above acceptable range. Up-titrate dosages of BP medications as needed to control BP. Will receive HD today, which may help. Monitor BP on current BP medication. Low salt diet

## 2018-10-18 NOTE — CHART NOTE - NSCHARTNOTEFT_GEN_A_CORE
Called by RN to evaluate pt with c/o Chest Pain and headache. Pt seen and examined   at bedside. Pt stated that she had a headache 8/10 on Pain Scale and that she felt new discomfort   on the left side of her chest. She was unable to describe the nature of the pain, but stated  that pain was 5/10 on pain scale. Pt denies palpitations, SOB, nausea or vomiting. Denies radiation  of discomfort.  V/S: 97.9, 69, 18, 148/85. o2 Sat 97 5 on room air.   Twelve Lead EKG done - HR 60, NSR. .  Discussed with Dr Miller. Troponin T done. Will continue to monitor. Called by RN to evaluate pt with c/o Chest Pain and headache. Pt seen and examined   at bedside. Pt stated that she had a headache 8/10 on Pain Scale and that she felt new discomfort   on the left side of her chest. She was unable to describe the nature of the pain, but stated  that pain was 5/10 on pain scale. Pt denies palpitations, SOB, nausea or vomiting. Denies radiation  of discomfort.  V/S: 97.9, 69, 18, 148/85. o2 Sat 97 5 on room air.   Twelve Lead EKG done - HR 60, NSR. .  Discussed with Dr Miller. Troponin T done. Will continue to monitor.    Troponin T result 43. Pt in NAD

## 2018-10-18 NOTE — PROGRESS NOTE ADULT - PROBLEM SELECTOR PLAN 1
Patient with ESRD in the setting of membranous nephropathy. Patient will be placed on RRT during this hospitalization. Patient requesting further information on dialysis modalities but appears to have selected HD. Labs reviewed. Pt. clinically stable. Continue to monitor renal function and BMP. Patient to start HD today with additional treatment tomorrow. DC planning for Friday to continue HD treatments at [facility]

## 2018-10-19 ENCOUNTER — TRANSCRIPTION ENCOUNTER (OUTPATIENT)
Age: 48
End: 2018-10-19

## 2018-10-19 LAB
HCT VFR BLD CALC: 25.9 % — LOW (ref 34.5–45)
HGB BLD-MCNC: 8.7 G/DL — LOW (ref 11.5–15.5)
MCHC RBC-ENTMCNC: 30.1 PG — SIGNIFICANT CHANGE UP (ref 27–34)
MCHC RBC-ENTMCNC: 33.6 GM/DL — SIGNIFICANT CHANGE UP (ref 32–36)
MCV RBC AUTO: 89.6 FL — SIGNIFICANT CHANGE UP (ref 80–100)
PHOSPHATE SERPL-MCNC: 5.3 MG/DL — HIGH (ref 2.5–4.5)
PLATELET # BLD AUTO: 195 K/UL — SIGNIFICANT CHANGE UP (ref 150–400)
RBC # BLD: 2.89 M/UL — LOW (ref 3.8–5.2)
RBC # FLD: 14 % — SIGNIFICANT CHANGE UP (ref 10.3–14.5)
WBC # BLD: 8.38 K/UL — SIGNIFICANT CHANGE UP (ref 3.8–10.5)
WBC # FLD AUTO: 8.38 K/UL — SIGNIFICANT CHANGE UP (ref 3.8–10.5)

## 2018-10-19 PROCEDURE — 99232 SBSQ HOSP IP/OBS MODERATE 35: CPT | Mod: GC

## 2018-10-19 RX ORDER — SEVELAMER CARBONATE 2400 MG/1
1 POWDER, FOR SUSPENSION ORAL
Qty: 30 | Refills: 0 | OUTPATIENT
Start: 2018-10-19

## 2018-10-19 RX ORDER — LABETALOL HCL 100 MG
1 TABLET ORAL
Qty: 0 | Refills: 0 | COMMUNITY
Start: 2018-10-19

## 2018-10-19 RX ORDER — ACETAMINOPHEN 500 MG
2 TABLET ORAL
Qty: 0 | Refills: 0 | DISCHARGE
Start: 2018-10-19

## 2018-10-19 RX ORDER — SODIUM BICARBONATE 1 MEQ/ML
1 SYRINGE (ML) INTRAVENOUS
Qty: 90 | Refills: 0 | OUTPATIENT
Start: 2018-10-19 | End: 2018-11-17

## 2018-10-19 RX ORDER — ONDANSETRON 8 MG/1
4 TABLET, FILM COATED ORAL ONCE
Qty: 0 | Refills: 0 | Status: COMPLETED | OUTPATIENT
Start: 2018-10-19 | End: 2018-10-19

## 2018-10-19 RX ORDER — SODIUM CHLORIDE 9 MG/ML
250 INJECTION INTRAMUSCULAR; INTRAVENOUS; SUBCUTANEOUS ONCE
Qty: 0 | Refills: 0 | Status: COMPLETED | OUTPATIENT
Start: 2018-10-19 | End: 2018-10-19

## 2018-10-19 RX ORDER — ACETAMINOPHEN 500 MG
650 TABLET ORAL ONCE
Qty: 0 | Refills: 0 | Status: COMPLETED | OUTPATIENT
Start: 2018-10-19 | End: 2018-10-19

## 2018-10-19 RX ADMIN — Medication 300 MILLIGRAM(S): at 19:38

## 2018-10-19 RX ADMIN — SEVELAMER CARBONATE 800 MILLIGRAM(S): 2400 POWDER, FOR SUSPENSION ORAL at 13:09

## 2018-10-19 RX ADMIN — Medication 650 MILLIGRAM(S): at 22:20

## 2018-10-19 RX ADMIN — Medication 650 MILLIGRAM(S): at 22:50

## 2018-10-19 RX ADMIN — SODIUM CHLORIDE 250 MILLILITER(S): 9 INJECTION INTRAMUSCULAR; INTRAVENOUS; SUBCUTANEOUS at 20:30

## 2018-10-19 RX ADMIN — SEVELAMER CARBONATE 800 MILLIGRAM(S): 2400 POWDER, FOR SUSPENSION ORAL at 19:37

## 2018-10-19 RX ADMIN — ONDANSETRON 4 MILLIGRAM(S): 8 TABLET, FILM COATED ORAL at 20:30

## 2018-10-19 RX ADMIN — ERYTHROPOIETIN 4000 UNIT(S): 10000 INJECTION, SOLUTION INTRAVENOUS; SUBCUTANEOUS at 16:37

## 2018-10-19 RX ADMIN — SEVELAMER CARBONATE 800 MILLIGRAM(S): 2400 POWDER, FOR SUSPENSION ORAL at 07:56

## 2018-10-19 RX ADMIN — Medication 300 MILLIGRAM(S): at 05:56

## 2018-10-19 NOTE — PROVIDER CONTACT NOTE (MEDICATION) - SITUATION
Patient pre 4th vanco trough resulted supratherapeutic (24.7). Currently ordered for Vanco IVPB 1000mg q12h.

## 2018-10-19 NOTE — DISCHARGE NOTE ADULT - MEDICATION SUMMARY - MEDICATIONS TO TAKE
I will START or STAY ON the medications listed below when I get home from the hospital:    acetaminophen 325 mg oral tablet  -- 2 tab(s) by mouth every 6 hours, As needed, Mild Pain (1 - 3)  -- Indication: For mild pain    sodium bicarbonate 650 mg oral tablet  -- 1 tab(s) by mouth 3 times a day  -- Indication: For Chronic kidney disease-mineral and bone disorder    labetalol 300 mg oral tablet  -- 1 tab(s) by mouth 2 times a day  -- Indication: For Chronic kidney disease-mineral and bone disorder    sevelamer hydrochloride 800 mg oral tablet  -- 1 tab(s) by mouth 3 times a day (with meals)  -- Indication: For Chronic kidney disease-mineral and bone disorder I will START or STAY ON the medications listed below when I get home from the hospital:    acetaminophen 325 mg oral tablet  -- 2 tab(s) by mouth every 6 hours, As needed, Mild Pain (1 - 3)  -- Indication: For mild pain    sodium bicarbonate 650 mg oral tablet  -- 1 tab(s) by mouth 3 times a day  -- Indication: For Chronic kidney disease-mineral and bone disorder    sevelamer hydrochloride 800 mg oral tablet  -- 1 tab(s) by mouth 3 times a day (with meals)  -- Indication: For Chronic kidney disease-mineral and bone disorder

## 2018-10-19 NOTE — DISCHARGE NOTE ADULT - CARE PROVIDER_API CALL
Neo Nunn), Internal Medicine; Nephrology  [address] [place] [address]  Phone: [phone]  Fax: [phone] Neo Nunn), Internal Medicine; Nephrology  [address] [place] [address]  Phone: [phone]  Fax: [phone]    Charlie Cunningham), Vascular Surgery  [address] [place] [address]  [address]  [place] [address]  Phone: [phone]  Fax: [phone]

## 2018-10-19 NOTE — DISCHARGE NOTE ADULT - CARE PLAN
Principal Discharge DX:	CKD (chronic kidney disease), stage V  Goal:	improved with dialysis  Assessment and plan of treatment:	please follow up outpatient for hemodialysis as instructed by your nephrologist  Secondary Diagnosis:	Essential hypertension  Assessment and plan of treatment:	Follow up with your medical doctor to establish long term blood pressure treatment goals. Principal Discharge DX:	CKD (chronic kidney disease), stage V  Goal:	improved with dialysis  Assessment and plan of treatment:	please follow up outpatient for hemodialysis as instructed by your nephrologist  Secondary Diagnosis:	Essential hypertension  Assessment and plan of treatment:	Follow up with your medical doctor to establish long term blood pressure treatment goals.  Secondary Diagnosis:	AVF (arteriovenous fistula)  Assessment and plan of treatment:	s/p AVF placement, please follow up with Dr nicole within 1 week.

## 2018-10-19 NOTE — DISCHARGE NOTE ADULT - PATIENT PORTAL LINK FT
You can access the [facility] Patient Portal, offered by [facility], by registering with the following website: [email or web address]

## 2018-10-19 NOTE — DISCHARGE NOTE ADULT - PLAN OF CARE
improved with dialysis please follow up outpatient for hemodialysis as instructed by your nephrologist Follow up with your medical doctor to establish long term blood pressure treatment goals. s/p AVF placement, please follow up with Dr nicole within 1 week.

## 2018-10-19 NOTE — DISCHARGE NOTE ADULT - HOSPITAL COURSE
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     Problem/Plan - 1:  ·  Problem: ESRD needing dialysis.  Plan: Patient with ESRD in the setting of membranous nephropathy. Patient will be placed on RRT during this hospitalization. Patient requesting further information on dialysis modalities but appears to have selected HD. Labs reviewed. Pt. clinically stable. Continue to monitor renal function and BMP. Patient to start HD today with additional treatment tomorrow. DC planning for Friday to continue HD treatments at [facility].      Problem/Plan - 2:  ·  Problem: Hyperkalemia.  Plan: Patient with hyperkalemia of 5.5 today. Will have HD treatment with standard potassium bath. Low potassium diet, monitor serum potassium.      Problem/Plan - 3:  ·  Problem: Hypertension, unspecified type.  Plan: BP above acceptable range. Up-titrate dosages of BP medications as needed to control BP. Will receive HD today, which may help. Monitor BP on current BP medication. Low salt diet.

## 2018-10-19 NOTE — DISCHARGE NOTE ADULT - CARE PROVIDERS DIRECT ADDRESSES
,[email or web address]

## 2018-10-19 NOTE — PROGRESS NOTE ADULT - PROBLEM SELECTOR PLAN 1
Patient with ESRD in the setting of membranous nephropathy. Patient placed on RRT during this hospitalization. Labs reviewed. Pt. clinically stable. DC planning for today to continue HD treatments at [facility] on TTS schedule

## 2018-10-19 NOTE — PROVIDER CONTACT NOTE (MEDICATION) - ACTION/TREATMENT ORDERED:
NP aware. NP notified RN she will keep frequency but adjust dose from Vanco IVPB 1000mg q12h to Vanco IVPB 750mg q12h, first dose to be admin STAT. Will continue to monitor

## 2018-10-20 VITALS
SYSTOLIC BLOOD PRESSURE: 123 MMHG | DIASTOLIC BLOOD PRESSURE: 79 MMHG | RESPIRATION RATE: 18 BRPM | TEMPERATURE: 98 F | OXYGEN SATURATION: 95 % | HEART RATE: 70 BPM

## 2018-10-20 LAB
HAV IGM SER-ACNC: SIGNIFICANT CHANGE UP
HBV CORE IGM SER-ACNC: SIGNIFICANT CHANGE UP
HBV CORE IGM SER-ACNC: SIGNIFICANT CHANGE UP
HBV SURFACE AB SER-ACNC: SIGNIFICANT CHANGE UP
HBV SURFACE AG SER-ACNC: REACTIVE
HBV SURFACE AG SER-ACNC: REACTIVE
HCV AB S/CO SERPL IA: 0.16 S/CO — SIGNIFICANT CHANGE UP
HCV AB SERPL-IMP: SIGNIFICANT CHANGE UP

## 2018-10-20 PROCEDURE — 85027 COMPLETE CBC AUTOMATED: CPT

## 2018-10-20 PROCEDURE — 84300 ASSAY OF URINE SODIUM: CPT

## 2018-10-20 PROCEDURE — 93971 EXTREMITY STUDY: CPT

## 2018-10-20 PROCEDURE — 99261: CPT

## 2018-10-20 PROCEDURE — 93005 ELECTROCARDIOGRAM TRACING: CPT

## 2018-10-20 PROCEDURE — 84702 CHORIONIC GONADOTROPIN TEST: CPT

## 2018-10-20 PROCEDURE — 80074 ACUTE HEPATITIS PANEL: CPT

## 2018-10-20 PROCEDURE — 71045 X-RAY EXAM CHEST 1 VIEW: CPT

## 2018-10-20 PROCEDURE — 83935 ASSAY OF URINE OSMOLALITY: CPT

## 2018-10-20 PROCEDURE — 84156 ASSAY OF PROTEIN URINE: CPT

## 2018-10-20 PROCEDURE — 87340 HEPATITIS B SURFACE AG IA: CPT

## 2018-10-20 PROCEDURE — 84484 ASSAY OF TROPONIN QUANT: CPT

## 2018-10-20 PROCEDURE — 85610 PROTHROMBIN TIME: CPT

## 2018-10-20 PROCEDURE — 86900 BLOOD TYPING SEROLOGIC ABO: CPT

## 2018-10-20 PROCEDURE — 99285 EMERGENCY DEPT VISIT HI MDM: CPT

## 2018-10-20 PROCEDURE — 87517 HEPATITIS B DNA QUANT: CPT

## 2018-10-20 PROCEDURE — C1889: CPT

## 2018-10-20 PROCEDURE — 81001 URINALYSIS AUTO W/SCOPE: CPT

## 2018-10-20 PROCEDURE — 86706 HEP B SURFACE ANTIBODY: CPT

## 2018-10-20 PROCEDURE — 80048 BASIC METABOLIC PNL TOTAL CA: CPT

## 2018-10-20 PROCEDURE — 76000 FLUOROSCOPY <1 HR PHYS/QHP: CPT

## 2018-10-20 PROCEDURE — 80053 COMPREHEN METABOLIC PANEL: CPT

## 2018-10-20 PROCEDURE — 86901 BLOOD TYPING SEROLOGIC RH(D): CPT

## 2018-10-20 PROCEDURE — 84100 ASSAY OF PHOSPHORUS: CPT

## 2018-10-20 PROCEDURE — 82728 ASSAY OF FERRITIN: CPT

## 2018-10-20 PROCEDURE — 86707 HEPATITIS BE ANTIBODY: CPT

## 2018-10-20 PROCEDURE — 99232 SBSQ HOSP IP/OBS MODERATE 35: CPT

## 2018-10-20 PROCEDURE — 86803 HEPATITIS C AB TEST: CPT

## 2018-10-20 PROCEDURE — 85730 THROMBOPLASTIN TIME PARTIAL: CPT

## 2018-10-20 PROCEDURE — 86704 HEP B CORE ANTIBODY TOTAL: CPT

## 2018-10-20 PROCEDURE — 83735 ASSAY OF MAGNESIUM: CPT

## 2018-10-20 PROCEDURE — 87350 HEPATITIS BE AG IA: CPT

## 2018-10-20 PROCEDURE — 83540 ASSAY OF IRON: CPT

## 2018-10-20 PROCEDURE — 83550 IRON BINDING TEST: CPT

## 2018-10-20 PROCEDURE — 86850 RBC ANTIBODY SCREEN: CPT

## 2018-10-20 PROCEDURE — C1750: CPT

## 2018-10-20 PROCEDURE — 86705 HEP B CORE ANTIBODY IGM: CPT

## 2018-10-20 PROCEDURE — C1894: CPT

## 2018-10-20 RX ADMIN — SEVELAMER CARBONATE 800 MILLIGRAM(S): 2400 POWDER, FOR SUSPENSION ORAL at 08:15

## 2018-10-20 NOTE — PROGRESS NOTE ADULT - PROBLEM SELECTOR PLAN 1
Patient with ESRD in the setting of membranous nephropathy most likely secondary to Hep B  Patient placed on RRT during this hospitalization.   Pt. clinically stable. DC planning for today to continue HD treatments at [facility] on TTS schedule.   Patient hepatitis B surface Ag positive. F/U with ID and GI as outpatient. Patient with ESRD in the setting of membranous nephropathy most likely secondary to Hep B  Patient placed on RRT during this hospitalization.   Pt. clinically stable. DC planning for today to continue HD treatments at [place] on TTS schedule. Patient hepatitis B surface Ag positive.

## 2018-10-20 NOTE — PROVIDER CONTACT NOTE (OTHER) - ACTION/TREATMENT ORDERED:
NP is ordering Hydralazine PO
repeated manually  167/100
NP aware, admin 250mL Bolus & IVP Zofran STAT, reassess patient in an hour. If patient is feeling better, discharge still possible for tonight. Will continue to monitor
Md notified and made aware. Assessed pt at bedside and no further action at this time. Will continue to monitor.
NP Broderick Brown aware. NP to bedside to assess. EKG, BMP & Troponin to be drawn. Will continue to monitor.
NP June aware. NP will come to bedside to assess. No further intervention required at this time. Will continue to monitor
NP aware. No IVF required at this time. Will continue to monitor

## 2018-10-20 NOTE — PROGRESS NOTE ADULT - PROBLEM SELECTOR PLAN 6
Serum phosphorus elevated at 6.2 yesterday. Started patient on phosphate binders with meals. Low phosphorus diet. Monitor serum phosphorus
Continue phosphate binders with meals. Low phosphorus diet. Monitor serum phosphorus
Started patient on phosphate binders with meals. Low phosphorus diet. Monitor serum phosphorus

## 2018-10-20 NOTE — PROGRESS NOTE ADULT - PROBLEM SELECTOR PROBLEM 6
Chronic kidney disease-mineral and bone disorder

## 2018-10-20 NOTE — PROGRESS NOTE ADULT - PROBLEM SELECTOR PROBLEM 1
ESRD needing dialysis

## 2018-10-20 NOTE — PROVIDER CONTACT NOTE (OTHER) - REASON
Patient admitted w/ ESRD, no IVF ordered, IVF for patient?
Patient received back from HD, admin PO Labetalol as ordered, endorses extreme dizziness and nausea upon attempting to stand
Pt reporting numbness & tingling below L arm AV fistula
Pt reports having headache & chest pain
Pts left initial dressing from fistula site is saturated
elevated /105, HR 57
elevated /108
Passed

## 2018-10-20 NOTE — PROGRESS NOTE ADULT - PROBLEM SELECTOR PROBLEM 4
Anemia due to chronic kidney disease, on chronic dialysis

## 2018-10-20 NOTE — PROGRESS NOTE ADULT - SUBJECTIVE AND OBJECTIVE BOX
Patient is a 48y old  Female who presents with a chief complaint of lethargy. admitted for fistula and PermCath placement (19 Oct 2018 12:24)      SUBJECTIVE / OVERNIGHT EVENTS:  did not go home yesterday because she felt dizzy after HD and her BP was low.    MEDICATIONS  (STANDING):  epoetin cleveland Injectable 4000 Unit(s) IV Push <User Schedule>  influenza   Vaccine 0.5 milliLiter(s) IntraMuscular once  PPD  5 Tuberculin Unit(s) Injectable 5 Unit(s) IntraDermal once  sevelamer hydrochloride 800 milliGRAM(s) Oral three times a day with meals  sodium bicarbonate 650 milliGRAM(s) Oral three times a day    MEDICATIONS  (PRN):  acetaminophen   Tablet .. 650 milliGRAM(s) Oral every 6 hours PRN Mild Pain (1 - 3)      Vital Signs Last 24 Hrs  T(C): 36.9 (20 Oct 2018 09:15), Max: 37.2 (19 Oct 2018 19:11)  T(F): 98.5 (20 Oct 2018 09:15), Max: 99 (20 Oct 2018 01:56)  HR: 65 (20 Oct 2018 09:15) (60 - 76)  BP: 124/78 (20 Oct 2018 09:15) (95/60 - 158/89)  BP(mean): --  RR: 18 (20 Oct 2018 09:15) (18 - 18)  SpO2: 99% (20 Oct 2018 09:15) (95% - 99%)  CAPILLARY BLOOD GLUCOSE        I&O's Summary    19 Oct 2018 07:01  -  20 Oct 2018 07:00  --------------------------------------------------------  IN: 810 mL / OUT: 2090 mL / NET: -1280 mL    20 Oct 2018 07:01  -  20 Oct 2018 11:47  --------------------------------------------------------  IN: 240 mL / OUT: 200 mL / NET: 40 mL        PHYSICAL EXAM:  GENERAL: NAD, well-developed  HEAD:  Atraumatic, Normocephalic  EYES: EOMI, PERRLA, conjunctiva and sclera clear  NECK: Supple, No JVD  CHEST/LUNG: Clear to auscultation bilaterally; No wheeze  HEART: Regular rate and rhythm; No murmurs, rubs, or gallops  ABDOMEN: Soft, Nontender, Nondistended; Bowel sounds present  EXTREMITIES:  2+ Peripheral Pulses, No clubbing, cyanosis, or edema  PSYCH: AAOx3  NEUROLOGY: non-focal  SKIN: No rashes or lesions    LABS:                        8.7    8.38  )-----------( 195      ( 19 Oct 2018 16:19 )             25.9     10-18    136  |  99  |  36<H>  ----------------------------<  109<H>  3.6   |  25  |  4.81<H>    Ca    9.0      18 Oct 2018 18:59  Phos  5.3     10-19                RADIOLOGY & ADDITIONAL TESTS:    Imaging Personally Reviewed:    Consultant(s) Notes Reviewed:      Care Discussed with Consultants/Other Providers:
[facility] DIVISION OF KIDNEY DISEASES AND HYPERTENSION -- FOLLOW UP NOTE  --------------------------------------------------------------------------------  Chief Complaint:    24 hour events/subjective:        PAST HISTORY  --------------------------------------------------------------------------------  No significant changes to PMH, PSH, FHx, SHx, unless otherwise noted    ALLERGIES & MEDICATIONS  --------------------------------------------------------------------------------  Allergies    latex (Unknown)  penicillin (Unknown)    Intolerances      Standing Inpatient Medications  influenza   Vaccine 0.5 milliLiter(s) IntraMuscular once  labetalol 300 milliGRAM(s) Oral two times a day  sevelamer hydrochloride 800 milliGRAM(s) Oral three times a day with meals  sodium bicarbonate 650 milliGRAM(s) Oral three times a day    PRN Inpatient Medications      REVIEW OF SYSTEMS  --------------------------------------------------------------------------------  Gen: No weight changes, fatigue, fevers/chills, weakness  Skin: No rashes  Head/Eyes/Ears/Mouth: No headache; Normal hearing; Normal vision w/o blurriness; No sinus pain/discomfort, sore throat  Respiratory: No dyspnea, cough, wheezing, hemoptysis  CV: No chest pain, PND, orthopnea  GI: No abdominal pain, diarrhea, constipation, nausea, vomiting, melena, hematochezia  : No increased frequency, dysuria, hematuria, nocturia  MSK: No joint pain/swelling; no back pain; no edema  Neuro: No dizziness/lightheadedness, weakness, seizures, numbness, tingling  Heme: No easy bruising or bleeding  Endo: No heat/cold intolerance  Psych: No significant nervousness, anxiety, stress, depression    All other systems were reviewed and are negative, except as noted.    VITALS/PHYSICAL EXAM  --------------------------------------------------------------------------------  T(C): 37 (10-17-18 @ 14:01), Max: 37.1 (10-16-18 @ 14:15)  HR: 60 (10-17-18 @ 14:01) (57 - 70)  BP: 176/108 (10-17-18 @ 14:01) (142/83 - 176/108)  RR: 18 (10-17-18 @ 14:01) (17 - 18)  SpO2: 98% (10-17-18 @ 14:01) (97% - 99%)  Wt(kg): --        10-16-18 @ 07:01  -  10-17-18 @ 07:00  --------------------------------------------------------  IN: 940 mL / OUT: 1175 mL / NET: -235 mL    10-17-18 @ 07:01  -  10-17-18 @ 14:09  --------------------------------------------------------  IN: 0 mL / OUT: 0 mL / NET: 0 mL      Physical Exam:  	Gen: NAD, well-appearing  	HEENT: PERRL, supple neck, clear oropharynx  	Pulm: CTA B/L  	CV: RRR, S1S2; no rub  	Back: No spinal or CVA tenderness; no sacral edema  	Abd: +BS, soft, nontender/nondistended  	: No suprapubic tenderness  	UE: Warm, FROM, no clubbing, intact strength; no edema; no asterixis  	LE: Warm, FROM, no clubbing, intact strength; no edema  	Neuro: No focal deficits, intact gait  	Psych: Normal affect and mood  	Skin: Warm, without rashes  	Vascular access:    LABS/STUDIES  --------------------------------------------------------------------------------              8.9    7.22  >-----------<  234      [10-17-18 @ 08:58]              27.2     139  |  107  |  68  ----------------------------<  83      [10-17-18 @ 07:24]  4.7   |  18  |  7.56        Ca     8.9     [10-17-18 @ 07:24]      PT/INR: PT 10.3 , INR 0.91       [10-17-18 @ 08:59]  PTT: 30.1       [10-17-18 @ 08:59]      Creatinine Trend:  SCr 7.56 [10-17 @ 07:24]  SCr 6.86 [10-16 @ 06:54]  SCr 6.93 [10-15 @ 10:43]    Urinalysis - [10-15-18 @ 12:28]      Color Light Yellow / Appearance Clear / SG 1.012 / pH 6.5      Gluc Trace / Ketone Negative  / Bili Negative / Urobili Negative       Blood Small / Protein 300 mg/dL / Leuk Est Negative / Nitrite Negative      RBC 6 / WBC 3 / Hyaline 3 / Gran  / Sq Epi  / Non Sq Epi 3 / Bacteria Few    Urine Creatinine 64      [10-15-18 @ 13:58]  Urine Protein 344      [10-15-18 @ 13:58]  Urine Sodium 62      [10-15-18 @ 12:28]  Urine Osmolality 331      [10-15-18 @ 12:28]    Iron 69, TIBC --, %sat --      [10-16-18 @ 09:31]  Ferritin 177      [10-16-18 @ 08:17]  PTH -- (Ca 8.6)      [08-12-18 @ 16:59]   281  Vitamin D (25OH) 12.3      [08-12-18 @ 16:59]  HbA1c 5.0      [08-12-18 @ 15:31]  TSH 8.52      [08-13-18 @ 08:15]  Lipid: chol 257, , HDL 32,       [08-12-18 @ 09:03]    HBsAb <3.0      [10-15-18 @ 23:46]  HBsAb Nonreact      [10-15-18 @ 23:46]  HBcAb Reactive      [10-15-18 @ 23:46]  HCV 0.15, Nonreact      [10-15-18 @ 23:46]
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     24 hour events/subjective: Patient seen and examined at bedside this AM. Patient states that she is still unsure whether she would like HD or PD, but has a feeling that HD will be more suitable for her lifestyle. Patient states that she is fatigued from not sleeping overnight. States that if she walks a good distance, she will become short of breath. Otherwise, denies CP, N/V/F/C.    PAST HISTORY  --------------------------------------------------------------------------------  No significant changes to PMH, PSH, FHx, SHx, unless otherwise noted    ALLERGIES & MEDICATIONS  --------------------------------------------------------------------------------  Allergies    latex (Unknown)  penicillin (Unknown)    Intolerances    Standing Inpatient Medications  influenza   Vaccine 0.5 milliLiter(s) IntraMuscular once  labetalol 300 milliGRAM(s) Oral two times a day  sevelamer hydrochloride 800 milliGRAM(s) Oral three times a day with meals  sodium bicarbonate 650 milliGRAM(s) Oral three times a day    REVIEW OF SYSTEMS  --------------------------------------------------------------------------------  Gen: + lethargy, + fatigue  Respiratory: No dyspnea at rest  CV: No chest pain  GI: No abdominal pain  MSK: No LE edema  Neuro: No dizziness  Heme: No bleeding    All other systems were reviewed and are negative, except as noted.    VITALS/PHYSICAL EXAM  --------------------------------------------------------------------------------  T(C): 37 (10-16-18 @ 06:50), Max: 37.2 (10-16-18 @ 01:00)  HR: 59 (10-16-18 @ 06:50) (56 - 67)  BP: 173/96 (10-16-18 @ 06:50) (140/89 - 175/94)  RR: 18 (10-16-18 @ 06:50) (17 - 18)  SpO2: 95% (10-16-18 @ 06:50) (95% - 99%)  Wt(kg): --  Height (cm): 170.18 (10-15-18 @ 09:13)  Weight (kg): 103.9 (10-15-18 @ 09:13)  BMI (kg/m2): 35.9 (10-15-18 @ 09:13)  BSA (m2): 2.14 (10-15-18 @ 09:13)    10-15-18 @ 07:01  -  10-16-18 @ 07:00  --------------------------------------------------------  IN: 0 mL / OUT: 800 mL / NET: -800 mL    Physical Exam:  	Gen: NAD, well-appearing  	HEENT: Supple neck  	Pulm: CTA B/L  	CV: RRR, S1S2; no rub  	Abd: +BS, soft, nontender/nondistended  	: No suprapubic tenderness  	LE: No edema  	Skin: Warm, without rashes  	Vascular access: none    LABS/STUDIES  --------------------------------------------------------------------------------              8.9    6.61  >-----------<  247      [10-16-18 @ 08:13]              26.2     138  |  110  |  66  ----------------------------<  91      [10-16-18 @ 06:54]  5.7   |  15  |  6.86        Ca     8.9     [10-16-18 @ 06:54]      Mg     1.9     [10-15-18 @ 10:43]      Phos  6.2     [10-15-18 @ 10:43]    TPro  7.1  /  Alb  3.5  /  TBili  0.4  /  DBili  x   /  AST  15  /  ALT  12  /  AlkPhos  50  [10-15-18 @ 10:43]    PT/INR: PT 10.2 , INR 0.95       [10-15-18 @ 10:43]  PTT: 26.9       [10-15-18 @ 10:43]    Creatinine Trend:  SCr 6.86 [10-16 @ 06:54]  SCr 6.93 [10-15 @ 10:43]    Urinalysis - [10-15-18 @ 12:28]      Color Light Yellow / Appearance Clear / SG 1.012 / pH 6.5      Gluc Trace / Ketone Negative  / Bili Negative / Urobili Negative       Blood Small / Protein 300 mg/dL / Leuk Est Negative / Nitrite Negative      RBC 6 / WBC 3 / Hyaline 3 / Gran  / Sq Epi  / Non Sq Epi 3 / Bacteria Few    Urine Creatinine 64      [10-15-18 @ 13:58]  Urine Protein 344      [10-15-18 @ 13:58]  Urine Sodium 62      [10-15-18 @ 12:28]  Urine Osmolality 331      [10-15-18 @ 12:28]    Iron 58, TIBC 252, %sat 23      [10-15-18 @ 23:46]  Ferritin 177      [10-16-18 @ 08:17]  PTH -- (Ca 8.6)      [08-12-18 @ 16:59]   281  Vitamin D (25OH) 12.3      [08-12-18 @ 16:59]  HbA1c 5.0      [08-12-18 @ 15:31]  TSH 8.52      [08-13-18 @ 08:15]  Lipid: chol 257, , HDL 32,       [08-12-18 @ 09:03]    HBsAb <3.0      [10-15-18 @ 23:46]  HBsAb Nonreact      [10-15-18 @ 23:46]  HBcAb Reactive      [10-15-18 @ 23:46]  HCV 0.15, Nonreact      [10-15-18 @ 23:46]
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     24 hour events/subjective: Patient seen and examined prior to HD treatment. Patient developed headache after HD treatment. Otherwise, patient feels well and has no complaints.    PAST HISTORY  --------------------------------------------------------------------------------  No significant changes to PMH, PSH, FHx, SHx, unless otherwise noted    ALLERGIES & MEDICATIONS  --------------------------------------------------------------------------------  Allergies    latex (Unknown)  penicillin (Unknown)    Intolerances    Standing Inpatient Medications  epoetin cleveland Injectable 4000 Unit(s) IV Push <User Schedule>  influenza   Vaccine 0.5 milliLiter(s) IntraMuscular once  labetalol 300 milliGRAM(s) Oral two times a day  PPD  5 Tuberculin Unit(s) Injectable 5 Unit(s) IntraDermal once  sevelamer hydrochloride 800 milliGRAM(s) Oral three times a day with meals  sodium bicarbonate 650 milliGRAM(s) Oral three times a day    REVIEW OF SYSTEMS  --------------------------------------------------------------------------------  All other systems were reviewed and are negative, except as noted.    VITALS/PHYSICAL EXAM  --------------------------------------------------------------------------------  T(C): 36.3 (10-19-18 @ 10:01), Max: 37.2 (10-18-18 @ 17:12)  HR: 62 (10-19-18 @ 10:01) (62 - 72)  BP: 128/74 (10-19-18 @ 10:01) (116/76 - 148/96)  RR: 18 (10-19-18 @ 10:01) (16 - 18)  SpO2: 96% (10-19-18 @ 10:01) (94% - 97%)  Wt(kg): --    10-18-18 @ 07:01  -  10-19-18 @ 07:00  --------------------------------------------------------  IN: 540 mL / OUT: 1300 mL / NET: -760 mL    10-19-18 @ 07:01  -  10-19-18 @ 13:31  --------------------------------------------------------  IN: 320 mL / OUT: 550 mL / NET: -230 mL    Physical Exam:  	Gen: NAD, well-appearing  	HEENT: Supple neck  	Pulm: CTA B/L  	CV: RRR, S1S2; no rub  	Abd: +BS, soft, nontender/nondistended  	: No suprapubic tenderness  	LE: No edema  	Skin: Warm, without rashes  	Vascular access: RIJ tunneled HD catheter accessed, L AVF with minimal thrill, + bruit    LABS/STUDIES  --------------------------------------------------------------------------------              9.3    9.76  >-----------<  221      [10-18-18 @ 09:12]              27.8     136  |  99  |  36  ----------------------------<  109      [10-18-18 @ 18:59]  3.6   |  25  |  4.81        Ca     9.0     [10-18-18 @ 18:59]      Mg     2.0     [10-18-18 @ 07:19]      Phos  5.3     [10-18-18 @ 07:19]    TPro  6.5  /  Alb  3.6  /  TBili  0.2  /  DBili  x   /  AST  11  /  ALT  6   /  AlkPhos  48  [10-18-18 @ 07:19]    Creatinine Trend:  SCr 4.81 [10-18 @ 18:59]  SCr 4.44 [10-18 @ 16:26]  SCr 7.45 [10-18 @ 07:19]  SCr 7.56 [10-17 @ 07:24]  SCr 6.86 [10-16 @ 06:54]
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     24 hour events/subjective: Patient seen and examined at bedside at HD. Had placement of RIJ tunneled HD catheter and LUE AVF yesterday. Patient nervous to start HD today. Otherwise denies CP, SOB, N/V/F/C.    PAST HISTORY  --------------------------------------------------------------------------------  No significant changes to PMH, PSH, FHx, SHx, unless otherwise noted    ALLERGIES & MEDICATIONS  --------------------------------------------------------------------------------  Allergies    latex (Unknown)  penicillin (Unknown)    Intolerances    Standing Inpatient Medications  epoetin cleveland Injectable 4000 Unit(s) IV Push <User Schedule>  influenza   Vaccine 0.5 milliLiter(s) IntraMuscular once  labetalol 300 milliGRAM(s) Oral two times a day  PPD  5 Tuberculin Unit(s) Injectable 5 Unit(s) IntraDermal once  sevelamer hydrochloride 800 milliGRAM(s) Oral three times a day with meals  sodium bicarbonate 650 milliGRAM(s) Oral three times a day    REVIEW OF SYSTEMS  --------------------------------------------------------------------------------  All other systems were reviewed and are negative, except as noted.    VITALS/PHYSICAL EXAM  --------------------------------------------------------------------------------  T(C): 37.1 (10-18-18 @ 05:59), Max: 37.2 (10-18-18 @ 01:06)  HR: 67 (10-18-18 @ 05:59) (59 - 86)  BP: 161/94 (10-18-18 @ 05:59) (143/78 - 176/108)  RR: 18 (10-18-18 @ 05:59) (16 - 20)  SpO2: 96% (10-18-18 @ 05:59) (93% - 99%)  Wt(kg): --    10-17-18 @ 07:01  -  10-18-18 @ 07:00  --------------------------------------------------------  IN: 560 mL / OUT: 750 mL / NET: -190 mL    Physical Exam:  	Gen: NAD, well-appearing  	HEENT: Supple neck  	Pulm: CTA B/L  	CV: RRR, S1S2; no rub  	Abd: +BS, soft, nontender/nondistended  	: No suprapubic tenderness  	LE: No edema  	Skin: Warm, without rashes  	Vascular access: RIJ tunneled HD catheter accessed, L AVF with minimal thrill, + bruit    LABS/STUDIES  --------------------------------------------------------------------------------              9.8    11.3  >-----------<  235      [10-17-18 @ 22:06]              29.1     136  |  107  |  68  ----------------------------<  81      [10-18-18 @ 07:19]  5.5   |  16  |  7.45        Ca     9.1     [10-18-18 @ 07:19]      Mg     2.0     [10-18-18 @ 07:19]      Phos  5.3     [10-18-18 @ 07:19]    TPro  6.5  /  Alb  3.6  /  TBili  0.2  /  DBili  x   /  AST  11  /  ALT  6   /  AlkPhos  48  [10-18-18 @ 07:19]    PT/INR: PT 10.3 , INR 0.91       [10-17-18 @ 08:59]  PTT: 30.1       [10-17-18 @ 08:59]      Creatinine Trend:  SCr 7.45 [10-18 @ 07:19]  SCr 7.56 [10-17 @ 07:24]  SCr 6.86 [10-16 @ 06:54]  SCr 6.93 [10-15 @ 10:43]
Patient is a 48y old  Female who presents with a chief complaint of ANEL on CKD (17 Oct 2018 14:09)      SUBJECTIVE / OVERNIGHT EVENTS:  see at HD.  s/p AVF and percath.  doing well    MEDICATIONS  (STANDING):  epoetin cleveland Injectable 4000 Unit(s) IV Push <User Schedule>  influenza   Vaccine 0.5 milliLiter(s) IntraMuscular once  labetalol 300 milliGRAM(s) Oral two times a day  PPD  5 Tuberculin Unit(s) Injectable 5 Unit(s) IntraDermal once  sevelamer hydrochloride 800 milliGRAM(s) Oral three times a day with meals  sodium bicarbonate 650 milliGRAM(s) Oral three times a day    MEDICATIONS  (PRN):  acetaminophen   Tablet .. 650 milliGRAM(s) Oral every 6 hours PRN Mild Pain (1 - 3)      Vital Signs Last 24 Hrs  T(C): 36.6 (18 Oct 2018 11:50), Max: 37.2 (18 Oct 2018 01:06)  T(F): 97.8 (18 Oct 2018 11:50), Max: 98.9 (18 Oct 2018 01:06)  HR: 63 (18 Oct 2018 11:50) (60 - 86)  BP: 150/83 (18 Oct 2018 11:50) (143/78 - 176/108)  BP(mean): 110 (17 Oct 2018 19:15) (105 - 111)  RR: 18 (18 Oct 2018 11:50) (16 - 20)  SpO2: 97% (18 Oct 2018 11:50) (93% - 98%)  CAPILLARY BLOOD GLUCOSE        I&O's Summary    17 Oct 2018 07:01  -  18 Oct 2018 07:00  --------------------------------------------------------  IN: 560 mL / OUT: 750 mL / NET: -190 mL    18 Oct 2018 07:01  -  18 Oct 2018 12:43  --------------------------------------------------------  IN: 0 mL / OUT: 1000 mL / NET: -1000 mL        PHYSICAL EXAM:  GENERAL: NAD, well-developed  HEAD:  Atraumatic, Normocephalic  EYES: EOMI, PERRLA, conjunctiva and sclera clear  NECK: Supple, No JVD  CHEST/LUNG: Clear to auscultation bilaterally; No wheeze  HEART: Regular rate and rhythm; No murmurs, rubs, or gallops  ABDOMEN: Soft, Nontender, Nondistended; Bowel sounds present  EXTREMITIES:  2+ Peripheral Pulses, No clubbing, cyanosis, or edema  PSYCH: AAOx3  NEUROLOGY: non-focal  SKIN: No rashes or lesions    LABS:                        9.3    9.76  )-----------( 221      ( 18 Oct 2018 09:12 )             27.8     10-18    136  |  107  |  68<H>  ----------------------------<  81  5.5<H>   |  16<L>  |  7.45<H>    Ca    9.1      18 Oct 2018 07:19  Phos  5.3     10-18  Mg     2.0     10-18    TPro  6.5  /  Alb  3.6  /  TBili  0.2  /  DBili  x   /  AST  11  /  ALT  6<L>  /  AlkPhos  48  10-18    PT/INR - ( 17 Oct 2018 08:59 )   PT: 10.3 sec;   INR: 0.91 ratio         PTT - ( 17 Oct 2018 08:59 )  PTT:30.1 sec          RADIOLOGY & ADDITIONAL TESTS:    Imaging Personally Reviewed:    Consultant(s) Notes Reviewed:      Care Discussed with Consultants/Other Providers:
Patient is a 48y old  Female who presents with a chief complaint of lethargy. admitted for fistula and PermCath placement (18 Oct 2018 12:43)      SUBJECTIVE / OVERNIGHT EVENTS:  No chest pain. No shortness of breath. No complaints. No events overnight.     MEDICATIONS  (STANDING):  epoetin cleveland Injectable 4000 Unit(s) IV Push <User Schedule>  influenza   Vaccine 0.5 milliLiter(s) IntraMuscular once  labetalol 300 milliGRAM(s) Oral two times a day  PPD  5 Tuberculin Unit(s) Injectable 5 Unit(s) IntraDermal once  sevelamer hydrochloride 800 milliGRAM(s) Oral three times a day with meals  sodium bicarbonate 650 milliGRAM(s) Oral three times a day    MEDICATIONS  (PRN):  acetaminophen   Tablet .. 650 milliGRAM(s) Oral every 6 hours PRN Mild Pain (1 - 3)      Vital Signs Last 24 Hrs  T(C): 36.3 (19 Oct 2018 10:01), Max: 37.2 (18 Oct 2018 17:12)  T(F): 97.3 (19 Oct 2018 10:01), Max: 98.9 (18 Oct 2018 17:12)  HR: 62 (19 Oct 2018 10:01) (62 - 72)  BP: 128/74 (19 Oct 2018 10:01) (116/76 - 148/96)  BP(mean): --  RR: 18 (19 Oct 2018 10:01) (16 - 18)  SpO2: 96% (19 Oct 2018 10:01) (94% - 97%)  CAPILLARY BLOOD GLUCOSE        I&O's Summary    18 Oct 2018 07:01  -  19 Oct 2018 07:00  --------------------------------------------------------  IN: 540 mL / OUT: 1300 mL / NET: -760 mL    19 Oct 2018 07:01  -  19 Oct 2018 12:12  --------------------------------------------------------  IN: 320 mL / OUT: 550 mL / NET: -230 mL        PHYSICAL EXAM:  GENERAL: NAD, well-developed  HEAD:  Atraumatic, Normocephalic  EYES: EOMI, PERRLA, conjunctiva and sclera clear  NECK: Supple, No JVD  CHEST/LUNG: Clear to auscultation bilaterally; No wheeze  HEART: Regular rate and rhythm; No murmurs, rubs, or gallops  ABDOMEN: Soft, Nontender, Nondistended; Bowel sounds present  EXTREMITIES:  2+ Peripheral Pulses, No clubbing, cyanosis, or edema  PSYCH: AAOx3  NEUROLOGY: non-focal  SKIN: No rashes or lesions    LABS:                        9.3    9.76  )-----------( 221      ( 18 Oct 2018 09:12 )             27.8     10-18    136  |  99  |  36<H>  ----------------------------<  109<H>  3.6   |  25  |  4.81<H>    Ca    9.0      18 Oct 2018 18:59  Phos  5.3     10-18  Mg     2.0     10-18    TPro  6.5  /  Alb  3.6  /  TBili  0.2  /  DBili  x   /  AST  11  /  ALT  6<L>  /  AlkPhos  48  10-18              RADIOLOGY & ADDITIONAL TESTS:    Imaging Personally Reviewed:    Consultant(s) Notes Reviewed:      Care Discussed with Consultants/Other Providers:
Patient is a 48y old  Female who presents with a chief complaint of uremia (15 Oct 2018 14:10)      SUBJECTIVE / OVERNIGHT EVENTS:  No chest pain. No shortness of breath. No complaints. No events overnight.     MEDICATIONS  (STANDING):  influenza   Vaccine 0.5 milliLiter(s) IntraMuscular once  labetalol 300 milliGRAM(s) Oral two times a day  sevelamer hydrochloride 800 milliGRAM(s) Oral three times a day with meals  sodium bicarbonate 650 milliGRAM(s) Oral three times a day    MEDICATIONS  (PRN):      Vital Signs Last 24 Hrs  T(C): 37.1 (16 Oct 2018 14:15), Max: 37.2 (16 Oct 2018 01:00)  T(F): 98.8 (16 Oct 2018 14:15), Max: 99 (16 Oct 2018 01:00)  HR: 57 (16 Oct 2018 14:15) (56 - 66)  BP: 176/105 (16 Oct 2018 14:15) (140/89 - 178/108)  BP(mean): --  RR: 18 (16 Oct 2018 14:15) (17 - 18)  SpO2: 97% (16 Oct 2018 14:15) (94% - 98%)  CAPILLARY BLOOD GLUCOSE        I&O's Summary    15 Oct 2018 07:  -  16 Oct 2018 07:00  --------------------------------------------------------  IN: 0 mL / OUT: 800 mL / NET: -800 mL    16 Oct 2018 07:01  -  16 Oct 2018 14:28  --------------------------------------------------------  IN: 540 mL / OUT: 0 mL / NET: 540 mL        PHYSICAL EXAM:  GENERAL: NAD, well-developed  HEAD:  Atraumatic, Normocephalic  EYES: EOMI, PERRLA, conjunctiva and sclera clear  NECK: Supple, No JVD  CHEST/LUNG: Clear to auscultation bilaterally; No wheeze  HEART: Regular rate and rhythm; No murmurs, rubs, or gallops  ABDOMEN: Soft, Nontender, Nondistended; Bowel sounds present  EXTREMITIES:  2+ Peripheral Pulses, No clubbing, cyanosis, or edema  PSYCH: AAOx3  NEUROLOGY: non-focal  SKIN: No rashes or lesions    LABS:                        8.9    6.61  )-----------( 247      ( 16 Oct 2018 08:13 )             26.2     10-16    138  |  110<H>  |  66<H>  ----------------------------<  91  5.7<H>   |  15<L>  |  6.86<H>    Ca    8.9      16 Oct 2018 06:54  Phos  6.2     10-15  Mg     1.9     10-15    TPro  7.1  /  Alb  3.5  /  TBili  0.4  /  DBili  x   /  AST  15  /  ALT  12  /  AlkPhos  50  10-15    PT/INR - ( 16 Oct 2018 13:26 )   PT: 10.7 sec;   INR: 0.98 ratio         PTT - ( 16 Oct 2018 13:26 )  PTT:30.6 sec      Urinalysis Basic - ( 15 Oct 2018 12:28 )    Color: Light Yellow / Appearance: Clear / S.012 / pH: x  Gluc: x / Ketone: Negative  / Bili: Negative / Urobili: Negative   Blood: x / Protein: 300 mg/dL / Nitrite: Negative   Leuk Esterase: Negative / RBC: 6 /hpf / WBC 3 /hpf   Sq Epi: x / Non Sq Epi: 3 /hpf / Bacteria: Few        RADIOLOGY & ADDITIONAL TESTS:    Imaging Personally Reviewed:    Consultant(s) Notes Reviewed:      Care Discussed with Consultants/Other Providers:
Patient is a 48y old  Female who presents with a chief complaint of uremia (16 Oct 2018 14:28)      SUBJECTIVE / OVERNIGHT EVENTS:  No chest pain. No shortness of breath. No complaints. No events overnight.     MEDICATIONS  (STANDING):  influenza   Vaccine 0.5 milliLiter(s) IntraMuscular once  labetalol 300 milliGRAM(s) Oral two times a day  sevelamer hydrochloride 800 milliGRAM(s) Oral three times a day with meals  sodium bicarbonate 650 milliGRAM(s) Oral three times a day    MEDICATIONS  (PRN):      Vital Signs Last 24 Hrs  T(C): 36.9 (17 Oct 2018 11:05), Max: 37.1 (16 Oct 2018 14:15)  T(F): 98.5 (17 Oct 2018 11:05), Max: 98.8 (16 Oct 2018 14:15)  HR: 59 (17 Oct 2018 11:05) (57 - 70)  BP: 149/83 (17 Oct 2018 11:05) (142/83 - 176/105)  BP(mean): --  RR: 18 (17 Oct 2018 11:05) (17 - 18)  SpO2: 99% (17 Oct 2018 11:05) (97% - 99%)  CAPILLARY BLOOD GLUCOSE        I&O's Summary    16 Oct 2018 07:01  -  17 Oct 2018 07:00  --------------------------------------------------------  IN: 940 mL / OUT: 1175 mL / NET: -235 mL        PHYSICAL EXAM:  GENERAL: NAD, well-developed  HEAD:  Atraumatic, Normocephalic  EYES: EOMI, PERRLA, conjunctiva and sclera clear  NECK: Supple, No JVD  CHEST/LUNG: Clear to auscultation bilaterally; No wheeze  HEART: Regular rate and rhythm; No murmurs, rubs, or gallops  ABDOMEN: Soft, Nontender, Nondistended; Bowel sounds present  EXTREMITIES:  2+ Peripheral Pulses, No clubbing, cyanosis, or edema  PSYCH: AAOx3  NEUROLOGY: non-focal  SKIN: No rashes or lesions    LABS:                        8.9    7.22  )-----------( 234      ( 17 Oct 2018 08:58 )             27.2     10-17    139  |  107  |  68<H>  ----------------------------<  83  4.7   |  18<L>  |  7.56<H>    Ca    8.9      17 Oct 2018 07:24      PT/INR - ( 17 Oct 2018 08:59 )   PT: 10.3 sec;   INR: 0.91 ratio         PTT - ( 17 Oct 2018 08:59 )  PTT:30.1 sec          RADIOLOGY & ADDITIONAL TESTS:    Imaging Personally Reviewed:    Consultant(s) Notes Reviewed:      Care Discussed with Consultants/Other Providers:
Vascular Surgery     Subjective: Pt seen/examined at bedside. No acute events overnight. Dressing saturated and changed in AM, denies uncontrolled LUE pain, changes in strength or sensation of LUE.         MEDICATIONS  (STANDING):  epoetin cle[place]and Injectable 4000 Unit(s) IV Push <User Schedule>  influenza   Vaccine 0.5 milliLiter(s) IntraMuscular once  labetalol 300 milliGRAM(s) Oral two times a day  PPD  5 Tuberculin Unit(s) Injectable 5 Unit(s) IntraDermal once  sevelamer hydrochloride 800 milliGRAM(s) Oral three times a day with meals  sodium bicarbonate 650 milliGRAM(s) Oral three times a day    MEDICATIONS  (PRN):  acetaminophen   Tablet .. 650 milliGRAM(s) Oral every 6 hours PRN Mild Pain (1 - 3)    acetaminophen   Tablet .. 650 milliGRAM(s) Oral every 6 hours PRN  epoetin cleveland Injectable 4000 Unit(s) IV Push <User Schedule>  influenza   Vaccine 0.5 milliLiter(s) IntraMuscular once  labetalol 300 milliGRAM(s) Oral two times a day  PPD  5 Tuberculin Unit(s) Injectable 5 Unit(s) IntraDermal once  sevelamer hydrochloride 800 milliGRAM(s) Oral three times a day with meals  sodium bicarbonate 650 milliGRAM(s) Oral three times a day    Allergies    latex (Unknown)  penicillin (Unknown)    Intolerances          Vital Signs Last 24 Hrs  T(C): 36.6 (18 Oct 2018 13:42), Max: 37.2 (18 Oct 2018 01:06)  T(F): 97.9 (18 Oct 2018 13:42), Max: 98.9 (18 Oct 2018 01:06)  HR: 69 (18 Oct 2018 13:42) (63 - 86)  BP: 148/85 (18 Oct 2018 13:42) (143/78 - 172/80)  BP(mean): 110 (17 Oct 2018 19:15) (105 - 111)  RR: 18 (18 Oct 2018 13:42) (16 - 20)  SpO2: 97% (18 Oct 2018 13:42) (93% - 97%)    I&O's Summary    17 Oct 2018 07:01  -  18 Oct 2018 07:00  --------------------------------------------------------  IN: 560 mL / OUT: 750 mL / NET: -190 mL    18 Oct 2018 07:01  -  18 Oct 2018 15:29  --------------------------------------------------------  IN: 160 mL / OUT: 1000 mL / NET: -840 mL        Physical Exam:  General: NAD, resting comfortably in bed  Pulmonary: breathing comfortably on RA  Abdominal: soft, NT/ND  Extremities: RUE  permacath CDI, no swelling or skin changes, LUE AVF dressing changed, good thrill.     LABS:                        9.3    9.76  )-----------( 221      ( 18 Oct 2018 09:12 )             27.8     10-18    136  |  107  |  68<H>  ----------------------------<  81  5.5<H>   |  16<L>  |  7.45<H>    Ca    9.1      18 Oct 2018 07:19  Phos  5.3     10-18  Mg     2.0     10-18    TPro  6.5  /  Alb  3.6  /  TBili  0.2  /  DBili  x   /  AST  11  /  ALT  6<L>  /  AlkPhos  48  10-18    PT/INR - ( 17 Oct 2018 08:59 )   PT: 10.3 sec;   INR: 0.91 ratio         PTT - ( 17 Oct 2018 08:59 )  PTT:30.1 sec    LIVER FUNCTIONS - ( 18 Oct 2018 07:19 )  Alb: 3.6 g/dL / Pro: 6.5 g/dL / ALK PHOS: 48 U/L / ALT: 6 U/L / AST: 11 U/L / GGT: x           CAPILLARY BLOOD GLUCOSE          RADIOLOGY & ADDITIONAL TESTS:
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    24 hour events/subjective:  Patient seen and examined at bed side, had yesterday yesterday,  afterwards felt dizzy. Has 1 lit removed. Today feeling better. No more dizziness. No N/V. No sob, cp.       PAST HISTORY  --------------------------------------------------------------------------------  No significant changes to PMH, PSH, FHx, SHx, unless otherwise noted    ALLERGIES & MEDICATIONS  --------------------------------------------------------------------------------  Allergies    latex (Unknown)  penicillin (Unknown)    Intolerances      Standing Inpatient Medications  epoetin cleveland Injectable 4000 Unit(s) IV Push <User Schedule>  influenza   Vaccine 0.5 milliLiter(s) IntraMuscular once  labetalol 300 milliGRAM(s) Oral two times a day  PPD  5 Tuberculin Unit(s) Injectable 5 Unit(s) IntraDermal once  sevelamer hydrochloride 800 milliGRAM(s) Oral three times a day with meals  sodium bicarbonate 650 milliGRAM(s) Oral three times a day    PRN Inpatient Medications  acetaminophen   Tablet .. 650 milliGRAM(s) Oral every 6 hours PRN      REVIEW OF SYSTEMS  --------------------------------------------------------------------------------  All other systems were reviewed and are negative, except as noted.    VITALS/PHYSICAL EXAM  --------------------------------------------------------------------------------  T(C): 36.3 (10-20-18 @ 04:43), Max: 37.2 (10-19-18 @ 19:11)  HR: 63 (10-20-18 @ 04:43) (60 - 76)  BP: 112/75 (10-20-18 @ 04:43) (95/60 - 158/89)  RR: 18 (10-20-18 @ 04:43) (18 - 18)  SpO2: 96% (10-20-18 @ 04:43) (95% - 98%)  Wt(kg): --        10-19-18 @ 07:01  -  10-20-18 @ 07:00  --------------------------------------------------------  IN: 560 mL / OUT: 2090 mL / NET: -1530 mL      Physical Exam:  	Gen: NAD, well-appearing  	HEENT: Supple neck  	Pulm: CTA B/L  	CV: RRR, S1S2; no rub  	Abd: +BS, soft, nontender/nondistended  	: No suprapubic tenderness  	LE: No edema  	Skin: Warm, without rashes  	Vascular access: RIJ tunneled HD catheter accessed, L AVF with minimal thrill, + bruit      LABS/STUDIES  --------------------------------------------------------------------------------              8.7    8.38  >-----------<  195      [10-19-18 @ 16:19]              25.9     136  |  99  |  36  ----------------------------<  109      [10-18-18 @ 18:59]  3.6   |  25  |  4.81        Ca     9.0     [10-18-18 @ 18:59]      Phos  5.3     [10-19-18 @ 15:21]      Creatinine Trend:  SCr 4.81 [10-18 @ 18:59]  SCr 4.44 [10-18 @ 16:26]  SCr 7.45 [10-18 @ 07:19]  SCr 7.56 [10-17 @ 07:24]  SCr 6.86 [10-16 @ 06:54]

## 2018-10-20 NOTE — PROVIDER CONTACT NOTE (OTHER) - RECOMMENDATIONS
NP notified, Hold off pending discharge for tonight? Zofran? Bolus?
NP notified
NP to bedside.
NP to bedside.
Notify MD

## 2018-10-20 NOTE — PROGRESS NOTE ADULT - PROBLEM SELECTOR PLAN 5
Patient with metabolic acidosis in the setting of ESRD. On sodium bicarbonate 650mg TID. Monitor serum CO2

## 2018-10-20 NOTE — PROGRESS NOTE ADULT - ASSESSMENT
48F with PMH of nephrotic syndrome (not on steroids), Hepatitis B S/P treatment and HTN, sent in by her nephrologist for dialysis catheter placement. Patient states she has renal failure 2/2 nephrotic syndrome with last visit to her nephrologist last week with Cr around 7. Was told she could go to the ED that day on Friday but things may be delayed over the weekend thus she presented to ED today. No report of fever, chills, cp, sob, n/v/d, dizziness.    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
48 F s/p LUE AVF placement and temporary HD access placement, recovering without issue    - Pt should be instructed to f/u as outpatient w/ Dr. Cunningham as outpatient, call to schedule the appointment [phone]  - Plan discussed with Dr. Albright  - Reconsult as needed    MATTHIAS Carrillo PGY-2  Vascular [id number]
48F with PMH of nephrotic syndrome (not on steroids), Hepatitis B S/P treatment and HTN, sent in by her nephrologist for dialysis catheter placement. Patient states she has renal failure 2/2 nephrotic syndrome with last visit to her nephrologist last week with Cr around 7. Was told she could go to the ED that day on Friday but things may be delayed over the weekend thus she presented to ED today. No report of fever, chills, cp, sob, n/v/d, dizziness.    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  poss discharge after if out pt HD has been set up.    anemia  - monitor hgb    hyperphosphatemia  - phosphate binders    HTN  - cw labetalol
48F with PMH of nephrotic syndrome (not on steroids), Hepatitis B S/P treatment and HTN, sent in by her nephrologist for dialysis catheter placement. Patient states she has renal failure 2/2 nephrotic syndrome with last visit to her nephrologist last week with Cr around 7. Was told she could go to the ED that day on Friday but things may be delayed over the weekend thus she presented to ED today. No report of fever, chills, cp, sob, n/v/d, dizziness.    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  poss discharge after if out pt HD has been set up.    anemia  - monitor hgb    hyperphosphatemia  - phosphate binders    HTN  - cw labetalol
48F with PMH of nephrotic syndrome (not on steroids), Hepatitis B S/P treatment and HTN, sent in by her nephrologist for dialysis catheter placement. Patient states she has renal failure 2/2 nephrotic syndrome with last visit to her nephrologist last week with Cr around 7. Was told she could go to the ED that day on Friday but things may be delayed over the weekend thus she presented to ED today. No report of fever, chills, cp, sob, n/v/d, dizziness.    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    anemia  - monitor hgb    hyperphosphatemia  - phosphate binders    HTN  - cw labetalol
48F with PMH of nephrotic syndrome (not on steroids), Hepatitis B S/P treatment and HTN, sent in by her nephrologist for dialysis catheter placement. Patient states she has renal failure 2/2 nephrotic syndrome with last visit to her nephrologist last week with Cr around 7. Was told she could go to the ED that day on Friday but things may be delayed over the weekend thus she presented to ED today. No report of fever, chills, cp, sob, n/v/d, dizziness.    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    anemia  - monitor hgb    hyperphosphatemia  - phosphate binders    HTN  - cw labetalol
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

## 2018-10-20 NOTE — PROGRESS NOTE ADULT - PROBLEM SELECTOR PLAN 2
ELIDA tomorrow.
Patient with hyperkalemia of 5.5 today. Will have HD treatment with standard potassium bath. Low potassium diet, monitor serum potassium
Resolved. Low potassium diet, monitor serum potassium
Patient with hyperkalemia in the setting of ESRD. Serum potassium this AM of 5.7, however severely hemolyzed specimen. Recommend repeating BMP. Low potassium diet. Monitor serum potassium
Resolved. Low potassium diet, monitor serum potassium

## 2018-10-20 NOTE — PROGRESS NOTE ADULT - ATTENDING COMMENTS
Dinah Davies MD  Cell   Pager   Office 
I have seen this patient with the fellow and agree with their assessment and plan. In addition, acute ESRD now on regular HD    ESRD: plan for 2nd session HD today. Tolerated HD well last evening. Awaiting Hep B surf ant before dc planning  Anemia: as per above  Access: Has AVF and Perm Cath and doing well, some numbness on the AVF area but expected slightly  Dispo: has hd spot for t/thus/sat and can get hd today and dc if all set up from Hep standpt    Dinah Davies MD  Cell   Pager   Office 
I have seen this patient with the fellow and agree with their assessment and plan. In addition, pt has decided to go with HD and will proceed with AVF and PC tomorrow. If done early, will do first session tomorrow and arrange for HD placement at [facility].  Start na bicarb last evening for acidosis  Binders started as well  Transplant surg called for intro to the program as she needed more information      d/w with Vascular and medicine    Dinah Davies MD  Cell   Pager   Office 
I have seen this patient with the fellow and agree with their assessment and plan. In addition, pt seen at 1st HD treatment  Tolerating well  Next HD tomorrow with extended time  Awaiting dialysis placement to [facility] under Dr Arline Davies MD  Cell   Pager   Office 
I have seen this patient with the fellow and agree with their assessment and plan. 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 Had 2 sessions and post second session, slightly lightheaded and low BP  Now improved  Dc off labetelol  Pt to start HD at [facility] T/u/Sat slot. Hep B surf An + so [facility] to be notified  Pt to call on monday at Dr Nunn's office and discuss starting HD outpatient.    Dinah Davies MD  Cell   Pager   Office 
[place]

## 2018-10-20 NOTE — PROVIDER CONTACT NOTE (OTHER) - ASSESSMENT
Patient received back from HD, vitals checked and stable therefore administered PO Labetalol as ordered, 30mins after pt c/o extreme dizziness and nausea upon attempting to stand. All VSS. Orthostatics attempted, patient unable to continue standing d/t dizziness.
Patient admitted w/ ESRD elevated BUN & Creatinine, no IVF ordered
Pt in bed, denies CP and SOB
Pt reporting numbness & tingling below L arm AV fistula. Erythema noted below dressing. Dressing CDI. Radial pulse palpable. Bruit audible.
Pt resting in bed, Pt a&ox4, Pt reports having headache & chest pain. /85, HR 69, RR 18, SpO2 97% on room air. Pt does not demonstrate any s/s of distress

## 2018-10-20 NOTE — PROVIDER CONTACT NOTE (OTHER) - SITUATION
Patient received back from HD, admin PO Labetalol as ordered, endorses extreme dizziness and nausea upon attempting to stand. All VSS. Orthostatics attempted, patient unable to continue standing
Patient admitted w/ ESRD, no IVF ordered, IVF for patient?
Pt reporting numbness & tingling below L arm AV fistula
Pt reports having headache & chest pain
Pts left initial dressing from Hemodialysis catheter is saturated w/ serosangious blood

## 2018-10-20 NOTE — PROGRESS NOTE ADULT - PROVIDER SPECIALTY LIST ADULT
Internal Medicine
Nephrology
Vascular Surgery
Internal Medicine
Nephrology

## 2018-10-20 NOTE — PROGRESS NOTE ADULT - PROBLEM SELECTOR PLAN 3
BP in acceptable range. Monitor BP on current BP medication. Low salt diet BP in acceptable range now but would hold labetelol on dc given lightheaded and dizzy post HD

## 2018-10-20 NOTE — PROGRESS NOTE ADULT - PROBLEM SELECTOR PLAN 4
Patient with anemia in the setting of ESRD. Iron studies noted. Hemoglobin below target range. Started on Epogen 4,000 units with HD. Monitor hemoglobin
Patient with anemia in the setting of ESRD. Iron studies noted. Hemoglobin below target range. Will likely require FRANCHESCA treatment with HD. Monitor hemoglobin
Patient with anemia in the setting of ESRD. Iron studies noted. Hemoglobin below target range. Will likely require FRANCHESCA treatment with HD. Monitor hemoglobin

## 2018-10-22 ENCOUNTER — INBOUND DOCUMENT (OUTPATIENT)
Age: 48
End: 2018-10-22

## 2018-10-23 ENCOUNTER — APPOINTMENT (OUTPATIENT)
Dept: VASCULAR SURGERY | Facility: CLINIC | Age: 48
End: 2018-10-23
Payer: COMMERCIAL

## 2018-10-23 PROCEDURE — 99024 POSTOP FOLLOW-UP VISIT: CPT

## 2018-10-23 PROCEDURE — 93990 DOPPLER FLOW TESTING: CPT

## 2018-10-24 DIAGNOSIS — K59.00 CONSTIPATION, UNSPECIFIED: ICD-10-CM

## 2018-11-13 ENCOUNTER — APPOINTMENT (OUTPATIENT)
Dept: VASCULAR SURGERY | Facility: CLINIC | Age: 48
End: 2018-11-13
Payer: COMMERCIAL

## 2018-11-13 VITALS
HEIGHT: 67 IN | DIASTOLIC BLOOD PRESSURE: 126 MMHG | HEART RATE: 81 BPM | BODY MASS INDEX: 35.94 KG/M2 | WEIGHT: 229 LBS | SYSTOLIC BLOOD PRESSURE: 155 MMHG

## 2018-11-13 PROCEDURE — 93990 DOPPLER FLOW TESTING: CPT

## 2018-11-13 PROCEDURE — 99024 POSTOP FOLLOW-UP VISIT: CPT

## 2018-12-05 NOTE — PATIENT PROFILE ADULT. - NS PRO OT REFERRAL QUES 2 YN
Telephone Encounter by Nishant Dorado RN, BSN at 08/27/18 10:29 AM     Author:  Nishant Dorado RN, BSN Service:  (none) Author Type:  Registered Nurse     Filed:  08/27/18 10:30 AM Encounter Date:  8/27/2018 Status:  Signed     :  Nishant Dorado RN, BSN (Registered Nurse)            Order placed for Diagnostic PSA per office visit note by Dr. Saba on 4/18/17.[AF1.1M]      Revision History        User Key Date/Time User Provider Type Action    > AF1.1 08/27/18 10:30 AM Nishant Dorado RN, BSN Registered Nurse Sign    M - Manual             no

## 2018-12-07 ENCOUNTER — APPOINTMENT (OUTPATIENT)
Dept: ENDOVASCULAR SURGERY | Facility: CLINIC | Age: 48
End: 2018-12-07
Payer: COMMERCIAL

## 2018-12-07 PROCEDURE — ZZZZZ: CPT

## 2018-12-13 ENCOUNTER — APPOINTMENT (OUTPATIENT)
Dept: VASCULAR SURGERY | Facility: CLINIC | Age: 48
End: 2018-12-13

## 2018-12-20 ENCOUNTER — APPOINTMENT (OUTPATIENT)
Dept: VASCULAR SURGERY | Facility: CLINIC | Age: 48
End: 2018-12-20
Payer: COMMERCIAL

## 2018-12-20 PROCEDURE — 99212 OFFICE O/P EST SF 10 MIN: CPT

## 2018-12-20 PROCEDURE — 93990 DOPPLER FLOW TESTING: CPT

## 2019-01-03 ENCOUNTER — FORM ENCOUNTER (OUTPATIENT)
Age: 49
End: 2019-01-03

## 2019-01-04 ENCOUNTER — APPOINTMENT (OUTPATIENT)
Dept: ENDOVASCULAR SURGERY | Facility: CLINIC | Age: 49
End: 2019-01-04
Payer: COMMERCIAL

## 2019-01-04 PROCEDURE — 36909Z: CUSTOM

## 2019-01-04 PROCEDURE — 36902Z: CUSTOM | Mod: 58

## 2019-01-04 PROCEDURE — 36011Z: CUSTOM | Mod: 59

## 2019-01-21 ENCOUNTER — INPATIENT (INPATIENT)
Facility: HOSPITAL | Age: 49
LOS: 2 days | Discharge: HOME | DRG: 640 | End: 2019-01-24
Attending: INTERNAL MEDICINE | Admitting: INTERNAL MEDICINE
Payer: COMMERCIAL

## 2019-01-21 VITALS
OXYGEN SATURATION: 95 % | SYSTOLIC BLOOD PRESSURE: 195 MMHG | HEIGHT: 67 IN | WEIGHT: 240.3 LBS | RESPIRATION RATE: 22 BRPM | TEMPERATURE: 98 F | DIASTOLIC BLOOD PRESSURE: 100 MMHG | HEART RATE: 78 BPM

## 2019-01-21 DIAGNOSIS — E83.39 OTHER DISORDERS OF PHOSPHORUS METABOLISM: ICD-10-CM

## 2019-01-21 DIAGNOSIS — N18.6 END STAGE RENAL DISEASE: ICD-10-CM

## 2019-01-21 DIAGNOSIS — E87.5 HYPERKALEMIA: ICD-10-CM

## 2019-01-21 DIAGNOSIS — J81.0 ACUTE PULMONARY EDEMA: ICD-10-CM

## 2019-01-21 DIAGNOSIS — I10 ESSENTIAL (PRIMARY) HYPERTENSION: ICD-10-CM

## 2019-01-21 LAB
ALBUMIN SERPL ELPH-MCNC: 4.4 G/DL — SIGNIFICANT CHANGE UP (ref 3.3–5)
ALP SERPL-CCNC: 61 U/L — SIGNIFICANT CHANGE UP (ref 40–120)
ALT FLD-CCNC: 21 U/L — SIGNIFICANT CHANGE UP (ref 10–45)
ANION GAP SERPL CALC-SCNC: 16 MMOL/L — SIGNIFICANT CHANGE UP (ref 5–17)
APTT BLD: 31.9 SEC — SIGNIFICANT CHANGE UP (ref 27.5–36.3)
AST SERPL-CCNC: 17 U/L — SIGNIFICANT CHANGE UP (ref 10–40)
BASOPHILS # BLD AUTO: 0.1 K/UL — SIGNIFICANT CHANGE UP (ref 0–0.2)
BASOPHILS NFR BLD AUTO: 0.6 % — SIGNIFICANT CHANGE UP (ref 0–2)
BILIRUB SERPL-MCNC: 0.4 MG/DL — SIGNIFICANT CHANGE UP (ref 0.2–1.2)
BUN SERPL-MCNC: 54 MG/DL — HIGH (ref 7–23)
CALCIUM SERPL-MCNC: 10 MG/DL — SIGNIFICANT CHANGE UP (ref 8.4–10.5)
CHLORIDE SERPL-SCNC: 103 MMOL/L — SIGNIFICANT CHANGE UP (ref 96–108)
CO2 SERPL-SCNC: 19 MMOL/L — LOW (ref 22–31)
CREAT SERPL-MCNC: 8.06 MG/DL — HIGH (ref 0.5–1.3)
EOSINOPHIL # BLD AUTO: 0.8 K/UL — HIGH (ref 0–0.5)
EOSINOPHIL NFR BLD AUTO: 7.7 % — HIGH (ref 0–6)
GAS PNL BLDV: SIGNIFICANT CHANGE UP
GLUCOSE SERPL-MCNC: 90 MG/DL — SIGNIFICANT CHANGE UP (ref 70–99)
HCG SERPL-ACNC: <2 MIU/ML — SIGNIFICANT CHANGE UP
HCT VFR BLD CALC: 36 % — SIGNIFICANT CHANGE UP (ref 34.5–45)
HGB BLD-MCNC: 11.9 G/DL — SIGNIFICANT CHANGE UP (ref 11.5–15.5)
INR BLD: 0.88 RATIO — SIGNIFICANT CHANGE UP (ref 0.88–1.16)
LYMPHOCYTES # BLD AUTO: 2.2 K/UL — SIGNIFICANT CHANGE UP (ref 1–3.3)
LYMPHOCYTES # BLD AUTO: 22.4 % — SIGNIFICANT CHANGE UP (ref 13–44)
MAGNESIUM SERPL-MCNC: 2.3 MG/DL — SIGNIFICANT CHANGE UP (ref 1.6–2.6)
MCHC RBC-ENTMCNC: 30.3 PG — SIGNIFICANT CHANGE UP (ref 27–34)
MCHC RBC-ENTMCNC: 33.1 GM/DL — SIGNIFICANT CHANGE UP (ref 32–36)
MCV RBC AUTO: 91.5 FL — SIGNIFICANT CHANGE UP (ref 80–100)
MONOCYTES # BLD AUTO: 0.7 K/UL — SIGNIFICANT CHANGE UP (ref 0–0.9)
MONOCYTES NFR BLD AUTO: 7.1 % — SIGNIFICANT CHANGE UP (ref 2–14)
NEUTROPHILS # BLD AUTO: 6.2 K/UL — SIGNIFICANT CHANGE UP (ref 1.8–7.4)
NEUTROPHILS NFR BLD AUTO: 62.2 % — SIGNIFICANT CHANGE UP (ref 43–77)
NT-PROBNP SERPL-SCNC: HIGH PG/ML (ref 0–300)
PHOSPHATE SERPL-MCNC: 5.1 MG/DL — HIGH (ref 2.5–4.5)
PLATELET # BLD AUTO: 294 K/UL — SIGNIFICANT CHANGE UP (ref 150–400)
POTASSIUM SERPL-MCNC: 6.1 MMOL/L — HIGH (ref 3.5–5.3)
POTASSIUM SERPL-SCNC: 6.1 MMOL/L — HIGH (ref 3.5–5.3)
PROT SERPL-MCNC: 7.1 G/DL — SIGNIFICANT CHANGE UP (ref 6–8.3)
PROTHROM AB SERPL-ACNC: 10.1 SEC — SIGNIFICANT CHANGE UP (ref 10–12.9)
RAPID RVP RESULT: SIGNIFICANT CHANGE UP
RBC # BLD: 3.93 M/UL — SIGNIFICANT CHANGE UP (ref 3.8–5.2)
RBC # FLD: 14.6 % — HIGH (ref 10.3–14.5)
SODIUM SERPL-SCNC: 138 MMOL/L — SIGNIFICANT CHANGE UP (ref 135–145)
WBC # BLD: 10 K/UL — SIGNIFICANT CHANGE UP (ref 3.8–10.5)
WBC # FLD AUTO: 10 K/UL — SIGNIFICANT CHANGE UP (ref 3.8–10.5)

## 2019-01-21 PROCEDURE — 93010 ELECTROCARDIOGRAM REPORT: CPT

## 2019-01-21 PROCEDURE — 99222 1ST HOSP IP/OBS MODERATE 55: CPT

## 2019-01-21 PROCEDURE — 99254 IP/OBS CNSLTJ NEW/EST MOD 60: CPT | Mod: GC

## 2019-01-21 PROCEDURE — 99291 CRITICAL CARE FIRST HOUR: CPT

## 2019-01-21 PROCEDURE — 71045 X-RAY EXAM CHEST 1 VIEW: CPT | Mod: 26

## 2019-01-21 PROCEDURE — 70450 CT HEAD/BRAIN W/O DYE: CPT | Mod: 26

## 2019-01-21 RX ORDER — ALBUTEROL 90 UG/1
2.5 AEROSOL, METERED ORAL ONCE
Qty: 0 | Refills: 0 | Status: COMPLETED | OUTPATIENT
Start: 2019-01-21 | End: 2019-01-21

## 2019-01-21 RX ORDER — LABETALOL HCL 100 MG
300 TABLET ORAL EVERY 12 HOURS
Qty: 0 | Refills: 0 | Status: DISCONTINUED | OUTPATIENT
Start: 2019-01-22 | End: 2019-01-24

## 2019-01-21 RX ORDER — NITROGLYCERIN 6.5 MG
0.4 CAPSULE, EXTENDED RELEASE ORAL ONCE
Qty: 0 | Refills: 0 | Status: COMPLETED | OUTPATIENT
Start: 2019-01-21 | End: 2019-01-21

## 2019-01-21 RX ORDER — ACETAMINOPHEN 500 MG
1000 TABLET ORAL ONCE
Qty: 0 | Refills: 0 | Status: COMPLETED | OUTPATIENT
Start: 2019-01-21 | End: 2019-01-21

## 2019-01-21 RX ORDER — IPRATROPIUM/ALBUTEROL SULFATE 18-103MCG
3 AEROSOL WITH ADAPTER (GRAM) INHALATION ONCE
Qty: 0 | Refills: 0 | Status: COMPLETED | OUTPATIENT
Start: 2019-01-21 | End: 2019-01-21

## 2019-01-21 RX ORDER — HEPARIN SODIUM 5000 [USP'U]/ML
5000 INJECTION INTRAVENOUS; SUBCUTANEOUS EVERY 12 HOURS
Qty: 0 | Refills: 0 | Status: DISCONTINUED | OUTPATIENT
Start: 2019-01-21 | End: 2019-01-24

## 2019-01-21 RX ORDER — ONDANSETRON 8 MG/1
4 TABLET, FILM COATED ORAL ONCE
Qty: 0 | Refills: 0 | Status: COMPLETED | OUTPATIENT
Start: 2019-01-21 | End: 2019-01-21

## 2019-01-21 RX ORDER — ACETAMINOPHEN 500 MG
650 TABLET ORAL ONCE
Qty: 0 | Refills: 0 | Status: COMPLETED | OUTPATIENT
Start: 2019-01-21 | End: 2019-01-21

## 2019-01-21 RX ORDER — HYDRALAZINE HCL 50 MG
10 TABLET ORAL ONCE
Qty: 0 | Refills: 0 | Status: COMPLETED | OUTPATIENT
Start: 2019-01-21 | End: 2019-01-21

## 2019-01-21 RX ORDER — FUROSEMIDE 40 MG
40 TABLET ORAL ONCE
Qty: 0 | Refills: 0 | Status: COMPLETED | OUTPATIENT
Start: 2019-01-21 | End: 2019-01-21

## 2019-01-21 RX ORDER — LABETALOL HCL 100 MG
300 TABLET ORAL ONCE
Qty: 0 | Refills: 0 | Status: COMPLETED | OUTPATIENT
Start: 2019-01-21 | End: 2019-01-21

## 2019-01-21 RX ORDER — ALBUTEROL 90 UG/1
2.5 AEROSOL, METERED ORAL ONCE
Qty: 0 | Refills: 0 | Status: DISCONTINUED | OUTPATIENT
Start: 2019-01-21 | End: 2019-01-21

## 2019-01-21 RX ORDER — NITROGLYCERIN 6.5 MG
1 CAPSULE, EXTENDED RELEASE ORAL ONCE
Qty: 0 | Refills: 0 | Status: COMPLETED | OUTPATIENT
Start: 2019-01-21 | End: 2019-01-21

## 2019-01-21 RX ORDER — DIPHENHYDRAMINE HCL 50 MG
12.5 CAPSULE ORAL ONCE
Qty: 0 | Refills: 0 | Status: COMPLETED | OUTPATIENT
Start: 2019-01-21 | End: 2019-01-21

## 2019-01-21 RX ORDER — FUROSEMIDE 40 MG
80 TABLET ORAL ONCE
Qty: 0 | Refills: 0 | Status: COMPLETED | OUTPATIENT
Start: 2019-01-21 | End: 2019-01-21

## 2019-01-21 RX ORDER — HYDROMORPHONE HYDROCHLORIDE 2 MG/ML
0.5 INJECTION INTRAMUSCULAR; INTRAVENOUS; SUBCUTANEOUS EVERY 8 HOURS
Qty: 0 | Refills: 0 | Status: DISCONTINUED | OUTPATIENT
Start: 2019-01-21 | End: 2019-01-24

## 2019-01-21 RX ADMIN — Medication 12.5 MILLIGRAM(S): at 07:15

## 2019-01-21 RX ADMIN — Medication 0.4 MILLIGRAM(S): at 05:20

## 2019-01-21 RX ADMIN — ONDANSETRON 4 MILLIGRAM(S): 8 TABLET, FILM COATED ORAL at 23:34

## 2019-01-21 RX ADMIN — Medication 80 MILLIGRAM(S): at 18:04

## 2019-01-21 RX ADMIN — ALBUTEROL 2.5 MILLIGRAM(S): 90 AEROSOL, METERED ORAL at 05:49

## 2019-01-21 RX ADMIN — HYDROMORPHONE HYDROCHLORIDE 0.5 MILLIGRAM(S): 2 INJECTION INTRAMUSCULAR; INTRAVENOUS; SUBCUTANEOUS at 18:42

## 2019-01-21 RX ADMIN — HEPARIN SODIUM 5000 UNIT(S): 5000 INJECTION INTRAVENOUS; SUBCUTANEOUS at 18:04

## 2019-01-21 RX ADMIN — Medication 10 MILLIGRAM(S): at 13:34

## 2019-01-21 RX ADMIN — Medication 1 INCH(S): at 06:58

## 2019-01-21 RX ADMIN — Medication 40 MILLIGRAM(S): at 05:18

## 2019-01-21 RX ADMIN — Medication 650 MILLIGRAM(S): at 10:40

## 2019-01-21 RX ADMIN — Medication 300 MILLIGRAM(S): at 21:44

## 2019-01-21 RX ADMIN — Medication 650 MILLIGRAM(S): at 10:09

## 2019-01-21 RX ADMIN — HYDROMORPHONE HYDROCHLORIDE 0.5 MILLIGRAM(S): 2 INJECTION INTRAMUSCULAR; INTRAVENOUS; SUBCUTANEOUS at 18:57

## 2019-01-21 RX ADMIN — Medication 0.4 MILLIGRAM(S): at 05:21

## 2019-01-21 RX ADMIN — Medication 3 MILLILITER(S): at 14:51

## 2019-01-21 RX ADMIN — Medication 400 MILLIGRAM(S): at 14:26

## 2019-01-21 NOTE — CHART NOTE - NSCHARTNOTEFT_GEN_A_CORE
Called to evaluate patient for Elevated Bp of 198/98 mm of hg (manual).  Seen and examined the patient at bedside. patient lying in bed, in no acute distress. States that HA resolved after received Dilaudid.   Denies dizziness, palpitations, visual changes, blurred vision, CP, SOB, lightheadedness, N/V,   No focal deficits    Vital Signs Last 24 Hrs  T(C): 36.8 (21 Jan 2019 21:07), Max: 36.8 (21 Jan 2019 16:03)  T(F): 98.3 (21 Jan 2019 21:07), Max: 98.3 (21 Jan 2019 21:07)  HR: 77 (21 Jan 2019 21:07) (70 - 88)  BP: 188/115 (21 Jan 2019 21:07) (160/107 - 200/117)  BP(mean): --  RR: 18 (21 Jan 2019 21:07) (17 - 36)  SpO2: 94% (21 Jan 2019 21:07) (94% - 100%)    PHYSICAL EXAM:  GENERAL: NAD, well developed  CHEST/LUNG: CTA B/L, no wheezes, stridor  HEART: Regular rate and rhythm  ABDOMEN: Soft, Nontender, Nondistended; Bowel sounds present  EXTREMITIES:  no edema   PSYCH: AAOx3  NEUROLOGY: A&ox3, motor strength 5/5 all 4 extremities, no facial droop, tongue midline    Assessment/Plan  48y female with PMH of nephrotic syndrome ESRD on dialysis M,W,F and HTN presenting with SOB/flash pulm edema in the setting of HTN, given nitro in the ED, s/p Lasix, HD. HA, resolved after Dilaudid.  Now with HTN /98 mm of hg. repeat 190/100 mm of hg (manual). HR 70 BPM  Patient asymptomatic, HA resolved  Patient takes Labetalol 300mg PO BID  Labetalol 300mg stat and bid ordered  CT head ordered, patient reluctant to go for CTH now, Will go for CTh later per pt  Discussed with Dr Brooks  Will follow closely  Will update with primary team    Patricia Levin [facility]  [phone]

## 2019-01-21 NOTE — CHART NOTE - NSCHARTNOTEFT_GEN_A_CORE
Patient seen and examined by me this evening.    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 She was noted to be hypertensive upon arrival as well. Patient still makes urine. Pulmonary consulted for persistent dyspnea after HD. On exam, mildly tachypnic but speaking in full sentences, 95% on RA, crackles to midfields bilaterally.     -Etiology of pulmonary edema likely related to underlying HD +/- flash pulmonary edema from hypertension.   -Recommend giving dose of lasix 80mg IVP and discuss additional ultrafiltration with renal tomorrow.   -Full pulmonary consult to follow tomorrow    Iggy Castro MD, PGY4  Pulmonary and Critical Care Fellow  [phone]

## 2019-01-21 NOTE — CONSULT NOTE ADULT - PROBLEM SELECTOR RECOMMENDATION 9
renal failure 2/2 membranous nephropathy likely 2/2 Hep B. On HD MWF at [facility] w/ Dr. Nunn. Started HD 10/18/18. Had LUE AVF created 10/17/18, still not mature yet. Had HD Friday. Now p/w SOB. Will plan for HD w/ UF today.   - HD consent signed and in chart.   - Will UF w/ HD as tolerated.   - Monitor BMP  - Daily weights.

## 2019-01-21 NOTE — ED PROVIDER NOTE - MUSCULOSKELETAL, MLM
Spine appears normal, range of motion is not limited, no muscle or joint tenderness.  R anterior chest wall rubin in place, no surrounding erythema or fluctuance.

## 2019-01-21 NOTE — ED PROVIDER NOTE - OBJECTIVE STATEMENT
48y female with PMH of nephrotic syndrome ESRD on dialysis M,W,F and HTN presenting with SOB. Started this evening woke her up from sleep.  Worse when supine. Was generally not feeling well yesterday.  Denies chest pain, cough, n/v/d, abdominal pain, LE swelling.  Still makes urine.  Medications: labetalol and lasix 48y female with PMH of nephrotic syndrome ESRD on dialysis M,W,F and HTN presenting with SOB. Started this evening woke her up from sleep.  Worse when supine. Was generally not feeling well yesterday.  Denies chest pain, cough, n/v/d, abdominal pain, LE swelling.  Still makes urine.    Last dialyzed 2 days ago, normal session, no complications.  No recent f/c, cough, congestion.  Limited hx given severity of symptoms though  states has been feeling more "under the weather" Saturday and Sunday.    Medications: labetalol and lasix

## 2019-01-21 NOTE — ED PROVIDER NOTE - CRITICAL CARE PROVIDED
interpretation of diagnostic studies/consult w/ pt's family directly relating to pts condition/documentation/consultation with other physicians/direct patient care (not related to procedure)/additional history taking

## 2019-01-21 NOTE — CONSULT NOTE ADULT - SUBJECTIVE AND OBJECTIVE BOX
[facility] DIVISION OF KIDNEY DISEASES AND HYPERTENSION -- INITIAL CONSULT NOTE  --------------------------------------------------------------------------------  HPI:  Pt is a 48yoF w/ nephrotic syndrome 2/2 membranous nephropathy, hepatitis B s/p treatment, HTN, ESRD on HD [facility], here for SOB.    Patient was first diagnosed with membranous nephropathy (PLA2R negative) in  which was confirmed with kidney biopsy.   Patient's previous nephrologist was Dr. Price, and is now being followed by Dr. Nunn.  Pt was started on HD in 2018.   Has HD at [facility] HD center, [facility]. Last HD was Friday.   Gets HD via right chest tunneled catheter. Has LUE AVF, but not  Pt still makes urine, and takes lasix 40mg as needed (if she feels she has extra fluid on her).  Pt c/o headaches and cramps with UF w/ HD, so often does not allow for any fluid removal during her outpt HD sessions.        PAST HISTORY  --------------------------------------------------------------------------------  PAST MEDICAL & SURGICAL HISTORY:  GERD (gastroesophageal reflux disease)  Latex allergy  Obesity, Class III, BMI 40-49.9 (morbid obesity)  Endometriosis  HTN (hypertension)  Dyslipidemia  Membranous Glomerulonephropathy  Chronic Hepatitis B  History of Nephrotic Syndrome  Hx of tonsillectomy: as a child  Hx of cholecystectomy  H/O  section x2: 2003    FAMILY HISTORY:  No pertinent family history in first degree relatives    PAST SOCIAL HISTORY:    ALLERGIES & MEDICATIONS  --------------------------------------------------------------------------------  Allergies    latex (Unknown)  penicillin (Unknown)    Intolerances      Standing Inpatient Medications    PRN Inpatient Medications      REVIEW OF SYSTEMS  --------------------------------------------------------------------------------  Gen: No weight changes, fatigue, fevers/chills, weakness  Skin: No rashes  Head/Eyes/Ears/Mouth: No headache; Normal hearing; Normal vision w/o blurriness; No sinus pain/discomfort, sore throat  Respiratory: No dyspnea, cough, wheezing, hemoptysis  CV: No chest pain, PND, orthopnea  GI: No abdominal pain, diarrhea, constipation, nausea, vomiting, melena, hematochezia  : No increased frequency, dysuria, hematuria, nocturia  MSK: No joint pain/swelling; no back pain; no edema  Neuro: No dizziness/lightheadedness, weakness, seizures, numbness, tingling  Heme: No easy bruising or bleeding  Endo: No heat/cold intolerance  Psych: No significant nervousness, anxiety, stress, depression    All other systems were reviewed and are negative, except as noted.    VITALS/PHYSICAL EXAM  --------------------------------------------------------------------------------  T(C): 36.5 (19 @ 09:05), Max: 36.7 (19 @ 04:48)  HR: 88 (19 @ 09:45) (70 - 88)  BP: 185/103 (19 @ 09:05) (168/97 - 200/117)  RR: 18 (19 @ 09:05) (17 - 36)  SpO2: 97% (19 @ 09:45) (95% - 100%)  Wt(kg): --  Height (cm): 170.18 (19 @ 04:48)  Weight (kg): 109 (19 @ 04:48)  BMI (kg/m2): 37.6 (19 @ 04:48)  BSA (m2): 2.19 (19 @ 04:48)      Physical Exam:  	Gen: NAD, well-appearing  	HEENT: PERRL, supple neck, clear oropharynx  	Pulm: CTA B/L  	CV: RRR, S1S2; no rub  	Back: No spinal or CVA tenderness; no sacral edema  	Abd: +BS, soft, nontender/nondistended  	: No suprapubic tenderness  	UE: Warm, FROM, no clubbing, intact strength; no edema; no asterixis  	LE: Warm, FROM, no clubbing, intact strength; no edema  	Neuro: No focal deficits, intact gait  	Psych: Normal affect and mood  	Skin: Warm, without rashes  	Vascular access:    LABS/STUDIES  --------------------------------------------------------------------------------              11.9   10.0  >-----------<  294      [19 @ 05:09]              36.0     138  |  103  |  54  ----------------------------<  90      [19 @ 05:09]  6.1   |  19  |  8.06        Ca     10.0     [19 @ 05:09]      Mg     2.3     [19 @ 05:09]      Phos  5.1     [19 @ 05:09]    TPro  7.1  /  Alb  4.4  /  TBili  0.4  /  DBili  x   /  AST  17  /  ALT  21  /  AlkPhos  61  [19 @ 05:09]    PT/INR: PT 10.1 , INR 0.88       [19 @ 05:09]  PTT: 31.9       [19 @ 05:09]      Creatinine Trend:  SCr 8.06 [ 05:09]    Urinalysis - [10-15-18 @ 12:28]      Color Light Yellow / Appearance Clear / SG 1.012 / pH 6.5      Gluc Trace / Ketone Negative  / Bili Negative / Urobili Negative       Blood Small / Protein 300 mg/dL / Leuk Est Negative / Nitrite Negative      RBC 6 / WBC 3 / Hyaline 3 / Gran  / Sq Epi  / Non Sq Epi 3 / Bacteria Few      Iron 69, TIBC --, %sat --      [10-16-18 @ 09:31]  Ferritin 177      [10-16-18 @ 08:17]  PTH -- (Ca 8.6)      [18 @ 16:59]   281  Vitamin D (25OH) 12.3      [18 @ 16:59]  HbA1c 5.0      [18 15:31]  TSH 8.52      [18 @ 08:15]  Lipid: chol 257, , HDL 32,       [18 @ 09:03]    HBsAb <3.0      [10-15-18 @ 23:46]  HBsAb Nonreact      [10-18-18 @ 17:10]  HBsAg Reactive      [10-18-18 @ 17:10]  HBcAb Reactive      [10-15-18 @ 23:46]  HCV 0.16, Nonreact      [10-18-18 @ 17:10]    GUME: titer Negative, pattern --      [18 @ 16:59] [facility] DIVISION OF KIDNEY DISEASES AND HYPERTENSION -- INITIAL CONSULT NOTE  --------------------------------------------------------------------------------  HPI:  Pt is a 48yoF w/ nephrotic syndrome 2/2 membranous nephropathy, hepatitis B s/p treatment, HTN, ESRD on HD [facility], here for SOB.    Patient was first diagnosed with membranous nephropathy (PLA2R negative) in  which was confirmed with kidney biopsy.   Patient's previous nephrologist was Dr. Price, and is now being followed by Dr. Nunn.  Pt was started on HD in 10/2018.   Has HD at [facility] HD center, [facility]. Last HD was Friday.   Gets HD via right chest tunneled catheter. Has LUE AVF, but not able to be used it. Had Vascular intervention,    Pt still makes urine, and takes lasix 40mg as needed (if she feels she has extra fluid on her).  Pt c/o headaches and cramps with UF w/ HD, so often does not allow for any fluid removal during her outpt HD sessions.    Pt now presents with acute SOB since  night- says she was fine the rest of the weekend.  No change in diet, no increased intake of fluid.   Pt says her urine output is the same, has not decreased.  Has not felt she needed to use her lasix over the weekend.     PAST HISTORY  --------------------------------------------------------------------------------  PAST MEDICAL & SURGICAL HISTORY:  GERD (gastroesophageal reflux disease)  Latex allergy  Obesity, Class III, BMI 40-49.9 (morbid obesity)  Endometriosis  HTN (hypertension)  Dyslipidemia  Membranous Glomerulonephropathy  Chronic Hepatitis B  History of Nephrotic Syndrome  Hx of tonsillectomy: as a child  Hx of cholecystectomy  H/O  section x2: 2003    FAMILY HISTORY:  No pertinent family history in first degree relatives    PAST SOCIAL HISTORY:    ALLERGIES & MEDICATIONS  --------------------------------------------------------------------------------  Allergies    latex (Unknown)  penicillin (Unknown)    Intolerances      Standing Inpatient Medications    PRN Inpatient Medications      REVIEW OF SYSTEMS  --------------------------------------------------------------------------------  Gen: No weight changes, fatigue, fevers/chills  Skin: No rashes  Head/Eyes/Ears/Mouth: + headache  Respiratory: + dyspnea, no cough  CV: No chest pain  GI: No abdominal pain, diarrhea, constipation, nausea, vomiting  : No increased frequency  MSK: No joint pain/swelling  Neuro: No dizziness/lightheadedness  Heme: No easy bruising or bleeding  Endo: No heat/cold intolerance  Psych: No significant nervousness    All other systems were reviewed and are negative, except as noted.    VITALS/PHYSICAL EXAM  --------------------------------------------------------------------------------  T(C): 36.5 (19 @ 09:05), Max: 36.7 (19 @ 04:48)  HR: 88 (19 @ 09:45) (70 - 88)  BP: 185/103 (19 @ 09:05) (168/97 - 200/117)  RR: 18 (19 @ 09:05) (17 - 36)  SpO2: 97% (19 @ 09:45) (95% - 100%)  Wt(kg): --  Height (cm): 170.18 (19 @ 04:48)  Weight (kg): 109 (19 @ 04:48)  BMI (kg/m2): 37.6 (19 @ 04:48)  BSA (m2): 2.19 (19 @ 04:48)      Physical Exam:  	Gen: NAD, on NC  	HEENT: anicteric  	Pulm: b/l decreased air entry  	CV: RRR, S1S2; no rub  	Back: No spinal or CVA tenderness; no sacral edema  	Abd: +BS, soft, nontender/nondistended  	: No suprapubic tenderness  	UE: Warm, FROM, no edema  	LE: Warm, trace edema  	Neuro: follows commands  	Psych: Normal affect and mood  	Skin: Warm, without rashes  	Vascular access: +LUE AVF +thrill +bruit +skin intact; +right chest tunneled catheter- no ttp    LABS/STUDIES  --------------------------------------------------------------------------------              11.9   10.0  >-----------<  294      [19 @ 05:09]              36.0     138  |  103  |  54  ----------------------------<  90      [19 @ 05:09]  6.1   |  19  |  8.06        Ca     10.0     [19 @ 05:09]      Mg     2.3     [19 @ 05:09]      Phos  5.1     [19 05:09]    TPro  7.1  /  Alb  4.4  /  TBili  0.4  /  DBili  x   /  AST  17  /  ALT  21  /  AlkPhos  61  [19 @ 05:09]    PT/INR: PT 10.1 , INR 0.88       [19 @ 05:09]  PTT: 31.9       [19 @ 05:09]      Creatinine Trend:  SCr 8.06 [ 05:09]    Urinalysis - [10-15-18 @ 12:28]      Color Light Yellow / Appearance Clear / SG 1.012 / pH 6.5      Gluc Trace / Ketone Negative  / Bili Negative / Urobili Negative       Blood Small / Protein 300 mg/dL / Leuk Est Negative / Nitrite Negative      RBC 6 / WBC 3 / Hyaline 3 / Gran  / Sq Epi  / Non Sq Epi 3 / Bacteria Few      Iron 69, TIBC --, %sat --      [10-16-18 @ 09:31]  Ferritin 177      [10-16-18 @ 08:17]  PTH -- (Ca 8.6)      [18 @ 16:59]   281  Vitamin D (25OH) 12.3      [18 @ 16:59]  HbA1c 5.0      [18 15:31]  TSH 8.52      [18 @ 08:15]  Lipid: chol 257, , HDL 32,       [18 @ 09:03]    HBsAb <3.0      [10-15-18 @ 23:46]  HBsAb Nonreact      [10-18-18 @ 17:10]  HBsAg Reactive      [10-18-18 @ 17:10]  HBcAb Reactive      [10-15-18 @ 23:46]  HCV 0.16, Nonreact      [10-18-18 @ 17:10]    GUME: titer Negative, pattern --      [18 @ 16:59]

## 2019-01-21 NOTE — CONSULT NOTE ADULT - ASSESSMENT
48yoF w/ nephrotic syndrome 2/2 membranous nephropathy, hepatitis B s/p treatment, HTN, ESRD on HD MWF, here for SOB.

## 2019-01-21 NOTE — ED ADULT NURSE REASSESSMENT NOTE - NS ED NURSE REASSESS COMMENT FT1
pt. to go to dialysis. report given to MYAH King. receiving an off tele order from MD to go off tele

## 2019-01-21 NOTE — ED PROVIDER NOTE - PROGRESS NOTE DETAILS
Attending note (Myron): patient admitted by prior team; per prior team to be transitioned to high flow nasal canula (admitted; on bipap for pulmonary edema, h/o ESRD on HD, to have HD later today).  will continue to monitor.  Mild hyperkalemia without ECG changes per prior team, given medications.

## 2019-01-21 NOTE — ED ADULT NURSE REASSESSMENT NOTE - NS ED NURSE REASSESS COMMENT FT1
report taken from RNSandy. Pt. A&Ox4. Pt. on bipap for SOB. Pt. in no acute respiratory distress. No chest pain. No abdominal pain. No n/v/fever/chills. Pt. just hypertensive and just had nitro paste placed. Pt. reports headache. MD made aware. Pt. also expresses discomfort from bipap mask. MD aware and at bedside. As per MD, respiratory to be called to switch pt. from bipap to high flow NC. Safety and comfort provided. Call bell within reach. Pt. on CM - NSR to the 70s. Clicked RTM. Awaiting bed assignment.

## 2019-01-21 NOTE — PROVIDER CONTACT NOTE (OTHER) - ACTION/TREATMENT ORDERED:
NP made aware; per NP admin labetalol as ordered. pt will go to ct scan once BP more controlled. will continue to monitor.

## 2019-01-21 NOTE — H&P ADULT - NSCORESITESY/N_GEN_A_CORE_RD
Clinic hours for Dr. Varma:  Monday    In Surgery  Tuesday 7:30am - 4:30pm  Wednesday 7:30am - 4:30pm  Thursday       7:30am - 4:30pm  Friday            7:30 - 11am    If you need a refill on your prescription please call your pharmacy and let them know. Please be proactive and call before your medication runs out.    The pharmacy will then contact us for the refill.  Please allow 24-48 hours for the refill to be processed.     If your physician has ordered additional laboratory or radiology testing as part of your ongoing plan of care, please allow 5-7 business days from the day of your lab draw or test for the results to be sent and reviewed by your provider. If your results are critical and require more immediate intervention, you will be contacted sooner. Your results will be conveyed to you via a phone call or letter.    You may be receiving a patient satisfaction survey in the mail.   Please take the time to complete, as your feedback is very important to us.   We strive to make your experience exceptional and your comments help us with that goal.  We look forward to hearing from you.       No

## 2019-01-21 NOTE — ED ADULT NURSE NOTE - OBJECTIVE STATEMENT
Pt is a 47 yo female c/o sob x about 4 hrs. She states that she woke up & started to have sob. Pt receives dialysis.

## 2019-01-21 NOTE — ED PROVIDER NOTE - BREATH SOUNDS
RHONCHI/RALES diminished air entry throughout; significant tachypnea and work of breathing.  Unable to assess jugular venous distension 2/2 soft tissue/RALES/RHONCHI

## 2019-01-21 NOTE — ED ADULT NURSE NOTE - NSIMPLEMENTINTERV_GEN_ALL_ED
Implemented All Universal Safety Interventions:  [place] to call system. Call bell, personal items and telephone within reach. Instruct patient to call for assistance. Room bathroom lighting operational. Non-slip footwear when patient is off stretcher. Physically safe environment: no spills, clutter or unnecessary equipment. Stretcher in lowest position, wheels locked, appropriate side rails in place.

## 2019-01-21 NOTE — H&P ADULT - ASSESSMENT
48y female with PMH of nephrotic syndrome ESRD on dialysis M,W,F and HTN presenting with SOB. Started this evening woke her up from sleep.  Worse when supine. Was generally not feeling well yesterday.  Denies chest pain, cough, n/v/d, abdominal pain, LE swelling.  Still makes urine.  Medications: labetalol and lasix.    acute pulm edema  hyperkalemia  - BIPAP prn  - HD per renal    HTN  - cw labetalol    head ache  - secondary to nitrates  - tylenol prn

## 2019-01-21 NOTE — H&P ADULT - NSHPLABSRESULTS_GEN_ALL_CORE
LABS:                        11.9   10.0  )-----------( 294      ( 21 Jan 2019 05:09 )             36.0     01-21    138  |  103  |  54<H>  ----------------------------<  90  6.1<H>   |  19<L>  |  8.06<H>    Ca    10.0      21 Jan 2019 05:09  Phos  5.1     01-21  Mg     2.3     01-21    TPro  7.1  /  Alb  4.4  /  TBili  0.4  /  DBili  x   /  AST  17  /  ALT  21  /  AlkPhos  61  01-21    PT/INR - ( 21 Jan 2019 05:09 )   PT: 10.1 sec;   INR: 0.88 ratio         PTT - ( 21 Jan 2019 05:09 )  PTT:31.9 sec          RADIOLOGY & ADDITIONAL TESTS:

## 2019-01-21 NOTE — ED PROVIDER NOTE - MEDICAL DECISION MAKING DETAILS
48y female ESRD on dialysis presenting with SOB from pulmonary edema.  Considering cause from acute URI, pneumonia, ACS, fluid overload, hyperK.  Gave nitro and lasix, started on BiPAP, ekg, cxr.  Will get labs, troponin.  Admit for urgent dialysis. 48y female ESRD on dialysis presenting with SOB from pulmonary edema.  Considering cause from acute URI, pneumonia, ACS, fluid overload, hyperK.  Gave nitro and lasix, started on BiPAP, ekg, cxr.  Will get labs, troponin.  Admit for urgent dialysis.  Attending Statement: Agree with the above.  Acute pulm edema likely 2/2 fluid overload in context of ESRD.  Unknown trigger but may be URI/pna as  states has been sick last few days.  No f/c.  Significant work of breathing and tachypnea but not hypoxic.  Given SLN and nitro paste, lasix, bipap initiated.  Significant improvement of work of breathing, much more comfortble on bipap.  EKG unchanged from prior, labs show mild hyperK without EKG changes.  Given albuterol.  Renal consulted, patient to be dialyzed this morning.  Does not require MICU at this time.  Given levoquin given questionable consolidation on R, noted to have mild erythema at infusion site soon after starting.  No airway symptoms or hypotension; levoquin stopped and benadryl provided.  S/O pending transition to HFNC given improvement of respiratory effort.  --BMM

## 2019-01-21 NOTE — ED ADULT NURSE REASSESSMENT NOTE - NS ED NURSE REASSESS COMMENT FT1
At 0700, pt states she's itchy on her R forearm; stopped Levaquin; ED attending Skip Bermudez aware.

## 2019-01-21 NOTE — CHART NOTE - NSCHARTNOTEFT_GEN_A_CORE
Medicine NP Note     ROSEMARY GUSTAFSON  MRN-[id number]  Allergies    latex (Unknown)  penicillin (Unknown)    Intolerances     Called to evaluate patient for Severe headache. Pt states she received nitro last night and this am and she has had a severe headache, unrelieved with tylenol ever since. Pt states pain 10/10, sensitive to light and sound. Denies visual changes, blurred vision, CP, SOB, lightheadedness, nausea. Admits to leg cramping exacerbated by diuresis and dialysis.     Vital Signs Last 24 Hrs  T(C): 36.7 (01-21-19 @ 17:55), Max: 36.8 (01-21-19 @ 16:03)  T(F): 98 (01-21-19 @ 17:55), Max: 98.2 (01-21-19 @ 16:03)  HR: 70 (01-21-19 @ 17:55) (70 - 88)  BP: 192/110 (01-21-19 @ 18:00) (160/107 - 200/117)  BP(mean): --  RR: 18 (01-21-19 @ 17:55) (17 - 36)  SpO2: 95% (01-21-19 @ 17:55) (95% - 100%)  Daily Height in cm: 170.18 (21 Jan 2019 04:48)    Daily   I&O's Summary    21 Jan 2019 07:01  -  21 Jan 2019 18:30  --------------------------------------------------------  IN: 0 mL / OUT: 1300 mL / NET: -1300 mL                              11.9   10.0  )-----------( 294      ( 21 Jan 2019 05:09 )             36.0     01-21    138  |  103  |  54<H>  ----------------------------<  90  6.1<H>   |  19<L>  |  8.06<H>    Ca    10.0      21 Jan 2019 05:09  Phos  5.1     01-21  Mg     2.3     01-21    TPro  7.1  /  Alb  4.4  /  TBili  0.4  /  DBili  x   /  AST  17  /  ALT  21  /  AlkPhos  61  01-21    PT/INR - ( 21 Jan 2019 05:09 )   PT: 10.1 sec;   INR: 0.88 ratio         PTT - ( 21 Jan 2019 05:09 )  PTT:31.9 sec          PHYSICAL EXAM:  GENERAL:Distressed, holding head in hands  CHEST/LUNG: bilateral rales  HEART: Regular rate and rhythm;   ABDOMEN: Soft, Nontender, Nondistended; Bowel sounds present  EXTREMITIES:  no edema   PSYCH: AAOx3  NEUROLOGY: non-focal      Assessment/Plan: HPI:  48y female with PMH of nephrotic syndrome ESRD on dialysis M,W,F and HTN presenting with SOB/flash pulm edema in the setting of HTN, given nitro in the ED now with Persistent Headaches and HTN.     1. Persistent, severe HA- could be sec to nitro or HTN. In the setting of prolonged HTN should rule out acute bleed. Discussed with Dr. Brooks, CT head ordered. Dilaudid for pain.     2. HTN- as Discussed with pulm fellow earlier, will give 1 dose IV lasix 80mg for persistent rales, hopeful this should lower BP. Will continue to monitor    3. Leg cramping- monitor electrolytes, Dilaudid to help, warm compresses as reportedly helps at home.

## 2019-01-21 NOTE — ED ADULT NURSE REASSESSMENT NOTE - NS ED NURSE REASSESS COMMENT FT1
pt. transferred to 2L NC and tolerating well. O2 remains above 95%. pt. okay to go to dialysis on 2L NC. dialysis RN aware,

## 2019-01-21 NOTE — H&P ADULT - HISTORY OF PRESENT ILLNESS
48y female with PMH of nephrotic syndrome ESRD on dialysis M,W,F and HTN presenting with SOB. Started this evening woke her up from sleep.  Worse when supine. Was generally not feeling well yesterday.  Denies chest pain, cough, n/v/d, abdominal pain, LE swelling.  Still makes urine.  Medications: labetalol and lasix

## 2019-01-22 LAB
ANION GAP SERPL CALC-SCNC: 18 MMOL/L — HIGH (ref 5–17)
BUN SERPL-MCNC: 38 MG/DL — HIGH (ref 7–23)
CALCIUM SERPL-MCNC: 9.9 MG/DL — SIGNIFICANT CHANGE UP (ref 8.4–10.5)
CHLORIDE SERPL-SCNC: 97 MMOL/L — SIGNIFICANT CHANGE UP (ref 96–108)
CO2 SERPL-SCNC: 22 MMOL/L — SIGNIFICANT CHANGE UP (ref 22–31)
CREAT SERPL-MCNC: 6.55 MG/DL — HIGH (ref 0.5–1.3)
GLUCOSE SERPL-MCNC: 111 MG/DL — HIGH (ref 70–99)
HCT VFR BLD CALC: 35.7 % — SIGNIFICANT CHANGE UP (ref 34.5–45)
HGB BLD-MCNC: 11.8 G/DL — SIGNIFICANT CHANGE UP (ref 11.5–15.5)
MAGNESIUM SERPL-MCNC: 2.2 MG/DL — SIGNIFICANT CHANGE UP (ref 1.6–2.6)
MCHC RBC-ENTMCNC: 30 PG — SIGNIFICANT CHANGE UP (ref 27–34)
MCHC RBC-ENTMCNC: 32.9 GM/DL — SIGNIFICANT CHANGE UP (ref 32–36)
MCV RBC AUTO: 91.3 FL — SIGNIFICANT CHANGE UP (ref 80–100)
PLATELET # BLD AUTO: 288 K/UL — SIGNIFICANT CHANGE UP (ref 150–400)
POTASSIUM SERPL-MCNC: 4.9 MMOL/L — SIGNIFICANT CHANGE UP (ref 3.5–5.3)
POTASSIUM SERPL-SCNC: 4.9 MMOL/L — SIGNIFICANT CHANGE UP (ref 3.5–5.3)
RBC # BLD: 3.92 M/UL — SIGNIFICANT CHANGE UP (ref 3.8–5.2)
RBC # FLD: 14.3 % — SIGNIFICANT CHANGE UP (ref 10.3–14.5)
SODIUM SERPL-SCNC: 137 MMOL/L — SIGNIFICANT CHANGE UP (ref 135–145)
WBC # BLD: 7.4 K/UL — SIGNIFICANT CHANGE UP (ref 3.8–10.5)
WBC # FLD AUTO: 7.4 K/UL — SIGNIFICANT CHANGE UP (ref 3.8–10.5)

## 2019-01-22 PROCEDURE — 99233 SBSQ HOSP IP/OBS HIGH 50: CPT | Mod: GC

## 2019-01-22 RX ORDER — ONDANSETRON 8 MG/1
4 TABLET, FILM COATED ORAL ONCE
Qty: 0 | Refills: 0 | Status: COMPLETED | OUTPATIENT
Start: 2019-01-22 | End: 2019-01-22

## 2019-01-22 RX ORDER — ACETAMINOPHEN 500 MG
650 TABLET ORAL EVERY 6 HOURS
Qty: 0 | Refills: 0 | Status: DISCONTINUED | OUTPATIENT
Start: 2019-01-22 | End: 2019-01-24

## 2019-01-22 RX ORDER — INFLUENZA VIRUS VACCINE 15; 15; 15; 15 UG/.5ML; UG/.5ML; UG/.5ML; UG/.5ML
0.5 SUSPENSION INTRAMUSCULAR ONCE
Qty: 0 | Refills: 0 | Status: DISCONTINUED | OUTPATIENT
Start: 2019-01-22 | End: 2019-01-24

## 2019-01-22 RX ADMIN — Medication 300 MILLIGRAM(S): at 10:31

## 2019-01-22 RX ADMIN — HEPARIN SODIUM 5000 UNIT(S): 5000 INJECTION INTRAVENOUS; SUBCUTANEOUS at 21:37

## 2019-01-22 RX ADMIN — Medication 300 MILLIGRAM(S): at 21:37

## 2019-01-22 RX ADMIN — HYDROMORPHONE HYDROCHLORIDE 0.5 MILLIGRAM(S): 2 INJECTION INTRAMUSCULAR; INTRAVENOUS; SUBCUTANEOUS at 12:45

## 2019-01-22 RX ADMIN — HEPARIN SODIUM 5000 UNIT(S): 5000 INJECTION INTRAVENOUS; SUBCUTANEOUS at 10:31

## 2019-01-22 RX ADMIN — HYDROMORPHONE HYDROCHLORIDE 0.5 MILLIGRAM(S): 2 INJECTION INTRAMUSCULAR; INTRAVENOUS; SUBCUTANEOUS at 13:00

## 2019-01-22 RX ADMIN — ONDANSETRON 4 MILLIGRAM(S): 8 TABLET, FILM COATED ORAL at 18:01

## 2019-01-22 NOTE — CONSULT NOTE ADULT - SUBJECTIVE AND OBJECTIVE BOX
Admitting Diagnosis:  Acute pulmonary edema (J81.0): ACUTE PULMONARY EDEMA      HPI:  This is a 48y year old Female with the below past medical history who presents with the chief complaint of    ****CHART HPI:  HPI:  47yo F with ESRD (2/2 Membranous Nephropathy, HD on M/W/F) and HTN presenting with acute SOB which woke her up from sleep. Until yesterday was in otherwise usual state of health. Overnight, patient was complaining of severe HA. No improvement with Tylenol. Describes as fronto-temporal, bilateral, and severe. Pt states the pain changes from   ******    Past Medical History:  GERD (gastroesophageal reflux disease) (530.81)  Latex allergy (995.3)  Obesity, Class III, BMI 40-49.9 (morbid obesity) (278.01)  Endometriosis (617.9)  HTN (hypertension) (401.9)  Dyslipidemia (272.4)  Membranous Glomerulonephropathy (583.1)  Chronic Hepatitis B (070.32)  History of Nephrotic Syndrome (V13.03)      Past Surgical History:  Hx of tonsillectomy (V45.89): as a child  Hx of cholecystectomy (V45.79)  H/O  section x2 (V45.89): ,       Social History:  No toxic habits    Family History:  FAMILY HISTORY:  No pertinent family history in first degree relatives      Allergies:  latex (Unknown)  penicillin (Unknown)      ROS:  Constitutional: Patient offers no complaints of fevers or significant weight loss  Ears, Nose, Mouth and Throat: The patient presents with no abnormalities of the head, ears, eyes, nose or throat  Skin: Patient offers no concerns of new rashes or lesions  Respiratory: The patient presents with no abnormalities of the respiratory tract  Cardiovascular: The patient presents with no cardiac abnormalities  Gastrointestinal: The patient presents with no abnormalities of the GI system  Genitourinary: The patient presents with no dysuria, hematuria or frequent urination  Neurological: See HPI  Endocrine: Patient offers no complaints of excessive thirst, urination, or heat/cold intolerance    Advanced care planning reviewed and noted in the chart.    Medications:  acetaminophen   Tablet .. 650 milliGRAM(s) Oral every 6 hours PRN  guaiFENesin   Syrup  (Sugar-Free) 100 milliGRAM(s) Oral every 6 hours PRN  heparin  Injectable 5000 Unit(s) SubCutaneous every 12 hours  HYDROmorphone  Injectable 0.5 milliGRAM(s) IV Push every 8 hours PRN  labetalol 300 milliGRAM(s) Oral every 12 hours      Labs:  CBC Full  -  ( 2019 09:40 )  WBC Count : 7.4 K/uL  Hemoglobin : 11.8 g/dL  Hematocrit : 35.7 %  Platelet Count - Automated : 288 K/uL  Mean Cell Volume : 91.3 fl  Mean Cell Hemoglobin : 30.0 pg  Mean Cell Hemoglobin Concentration : 32.9 gm/dL  Auto Neutrophil # : x  Auto Lymphocyte # : x  Auto Monocyte # : x  Auto Eosinophil # : x  Auto Basophil # : x  Auto Neutrophil % : x  Auto Lymphocyte % : x  Auto Monocyte % : x  Auto Eosinophil % : x  Auto Basophil % : x        137  |  97  |  38<H>  ----------------------------<  111<H>  4.9   |  22  |  6.55<H>    Ca    9.9      2019 09:30  Phos  5.1       Mg     2.2         TPro  7.1  /  Alb  4.4  /  TBili  0.4  /  DBili  x   /  AST  17  /  ALT  21  /  AlkPhos  61      CAPILLARY BLOOD GLUCOSE        LIVER FUNCTIONS - ( 2019 05:09 )  Alb: 4.4 g/dL / Pro: 7.1 g/dL / ALK PHOS: 61 U/L / ALT: 21 U/L / AST: 17 U/L / GGT: x           PT/INR - ( 2019 05:09 )   PT: 10.1 sec;   INR: 0.88 ratio         PTT - ( 2019 05:09 )  PTT:31.9 sec    Female    Vitals:  Vital Signs Last 24 Hrs  T(C): 36.7 (2019 08:28), Max: 36.8 (2019 16:03)  T(F): 98 (2019 08:28), Max: 98.3 (2019 21:07)  HR: 73 (2019 08:28) (70 - 81)  BP: 152/89 (2019 08:28) (148/86 - 198/98)  BP(mean): --  RR: 18 (2019 08:28) (18 - 18)  SpO2: 95% (2019 08:28) (94% - 97%)    NEUROLOGICAL EXAM:    Mental status: Awake, alert, and in no apparent distress. Oriented to person, place and time. Language function is normal. Recent memory, digit span and concentration were normal.     Cranial Nerves: Pupils were equal, round, reactive to light. Extraocular movements were intact. Visual field were full. Fundoscopic exam was deferred. Facial sensation was intact to light touch. There was no facial asymmetry. The palate was upgoing symmetrically and tongue was midline. Hearing acuity was intact to finger rub AU. Shoulder shrug was full bilaterally    Motor exam: Bulk and tone were normal. Strength was 5/5 in all four extremities. Fine finger movements were symmetric and normal. There was no pronator drift    Reflexes: 2+ in the bilateral upper extremities. 2+ in the bilateral lower extremities. Toes were downgoing bilaterally.     Sensation: Intact to light touch, temperature, vibration and proprioception.     Coordination: Finger-nose-finger and heel-to-shin was without dysmetria.     Gait: Narrow base and steady. Romberg was negative. Admitting Diagnosis:  Acute pulmonary edema (J81.0): ACUTE PULMONARY EDEMA      HPI:  This is a 48y year old Female with the below past medical history who presents with the chief complaint of SOB    ****CHART HPI:  HPI:  49yo F with ESRD (2/2 Membranous Nephropathy, HD on M/W/F) and HTN presenting with acute SOB which woke her up from sleep. Until yesterday was in otherwise usual state of health. Overnight, patient was complaining of severe HA. No improvement with Tylenol. Of note, at the time of HA BP was noted to be 190s/100s. At the time, CT Head was ordered but patient refused to go until her pain subsided. SDescribes as fronto-temporal, bilateral, and severe. Pt states the pain characteristics differ from stabbing to squeezing to pounding. She denies any preceding aura, change in vision, nausea/vomiting. Currently her HA has resolved but she recently received pain medicine. Patient reports chronic issue with HA for past several months and comments that she develops severe HA after HD sessions as well.  ******    Past Medical History:  GERD (gastroesophageal reflux disease) (530.81)  Latex allergy (995.3)  Obesity, Class III, BMI 40-49.9 (morbid obesity) (278.01)  Endometriosis (617.9)  HTN (hypertension) (401.9)  Dyslipidemia (272.4)  Membranous Glomerulonephropathy (583.1)  Chronic Hepatitis B (070.32)  History of Nephrotic Syndrome (V13.03)      Past Surgical History:  Hx of tonsillectomy (V45.89): as a child  Hx of cholecystectomy (V45.79)  H/O  section x2 (V45.89): ,       Social History:  No toxic habits    Family History:  FAMILY HISTORY:  No pertinent family history in first degree relatives      Allergies:  latex (Unknown)  penicillin (Unknown)      ROS:  Constitutional: Patient offers no complaints of fevers or significant weight loss  Ears, Nose, Mouth and Throat: The patient presents with no abnormalities of the head, ears, eyes, nose or throat  Skin: Patient offers no concerns of new rashes or lesions  Respiratory: The patient presents with no abnormalities of the respiratory tract  Cardiovascular: The patient presents with no cardiac abnormalities  Gastrointestinal: The patient presents with no abnormalities of the GI system  Genitourinary: The patient presents with no dysuria, hematuria or frequent urination  Neurological: See HPI  Endocrine: Patient offers no complaints of excessive thirst, urination, or heat/cold intolerance    Advanced care planning reviewed and noted in the chart.    Medications:  acetaminophen   Tablet .. 650 milliGRAM(s) Oral every 6 hours PRN  guaiFENesin   Syrup  (Sugar-Free) 100 milliGRAM(s) Oral every 6 hours PRN  heparin  Injectable 5000 Unit(s) SubCutaneous every 12 hours  HYDROmorphone  Injectable 0.5 milliGRAM(s) IV Push every 8 hours PRN  labetalol 300 milliGRAM(s) Oral every 12 hours      Labs:  CBC Full  -  ( 2019 09:40 )  WBC Count : 7.4 K/uL  Hemoglobin : 11.8 g/dL  Hematocrit : 35.7 %  Platelet Count - Automated : 288 K/uL  Mean Cell Volume : 91.3 fl  Mean Cell Hemoglobin : 30.0 pg  Mean Cell Hemoglobin Concentration : 32.9 gm/dL  Auto Neutrophil # : x  Auto Lymphocyte # : x  Auto Monocyte # : x  Auto Eosinophil # : x  Auto Basophil # : x  Auto Neutrophil % : x  Auto Lymphocyte % : x  Auto Monocyte % : x  Auto Eosinophil % : x  Auto Basophil % : x        137  |  97  |  38<H>  ----------------------------<  111<H>  4.9   |  22  |  6.55<H>    Ca    9.9      2019 09:30  Phos  5.1       Mg     2.2         TPro  7.1  /  Alb  4.4  /  TBili  0.4  /  DBili  x   /  AST  17  /  ALT  21  /  AlkPhos  61  -    CAPILLARY BLOOD GLUCOSE        LIVER FUNCTIONS - ( 2019 05:09 )  Alb: 4.4 g/dL / Pro: 7.1 g/dL / ALK PHOS: 61 U/L / ALT: 21 U/L / AST: 17 U/L / GGT: x           PT/INR - ( 2019 05:09 )   PT: 10.1 sec;   INR: 0.88 ratio         PTT - ( 2019 05:09 )  PTT:31.9 sec    Female    Vitals:  Vital Signs Last 24 Hrs  T(C): 36.7 (2019 08:28), Max: 36.8 (2019 16:03)  T(F): 98 (2019 08:28), Max: 98.3 (2019 21:07)  HR: 73 (2019 08:28) (70 - 81)  BP: 152/89 (2019 08:28) (148/86 - 198/98)  BP(mean): --  RR: 18 (2019 08:28) (18 - 18)  SpO2: 95% (2019 08:28) (94% - 97%)    NEUROLOGICAL EXAM:    Mental status: Awake, alert, and in no apparent distress. Oriented to person, place and time. Language function is normal. Recent memory, digit span and concentration were normal.     Cranial Nerves: Pupils were equal, round, reactive to light. Extraocular movements were intact. Visual field were full. Fundoscopic exam was deferred. Facial sensation was intact to light touch. There was no facial asymmetry. The palate was upgoing symmetrically and tongue was midline. Hearing acuity was intact to finger rub AU. Shoulder shrug was full bilaterally    Motor exam: Bulk and tone were normal. Strength was 5/5 in all four extremities. Fine finger movements were symmetric and normal. There was no pronator drift    Reflexes: 2+ in the bilateral upper extremities. 2+ in the bilateral lower extremities. Toes were downgoing bilaterally.     Sensation: Intact to light touch, temperature, vibration and proprioception.     Coordination: Finger-nose-finger and heel-to-shin was without dysmetria.     Gait: Narrow base and steady. Romberg was negative. Admitting Diagnosis:  Acute pulmonary edema (J81.0): ACUTE PULMONARY EDEMA      HPI:  This is a 48y year old Female with the below past medical history who presents with the chief complaint of SOB    ****CHART HPI:  HPI:  47yo F with ESRD (2/2 Membranous Nephropathy, HD on M/W/F) and HTN presenting with acute SOB which woke her up from sleep. Until yesterday was in otherwise usual state of health. Overnight, patient was complaining of severe HA. No improvement with Tylenol. Of note, at the time of HA BP was noted to be 190s/100s. At the time, CT Head was ordered but patient refused to go until her pain subsided. She was given Dilaudid with resolution of her pain. Describes as fronto-temporal, bilateral, and severe. Pt states the pain characteristics differ from stabbing to squeezing to pounding. She denies any preceding aura, change in vision, nausea/vomiting. Currently her HA has resolved but she recently received pain medicine. Patient reports chronic issue with HA for past several months and comments that she develops severe HA after HD sessions as well.  ******    Past Medical History:  GERD (gastroesophageal reflux disease) (530.81)  Latex allergy (995.3)  Obesity, Class III, BMI 40-49.9 (morbid obesity) (278.01)  Endometriosis (617.9)  HTN (hypertension) (401.9)  Dyslipidemia (272.4)  Membranous Glomerulonephropathy (583.1)  Chronic Hepatitis B (070.32)  History of Nephrotic Syndrome (V13.03)      Past Surgical History:  Hx of tonsillectomy (V45.89): as a child  Hx of cholecystectomy (V45.79)  H/O  section x2 (V45.89): ,       Social History:  No toxic habits    Family History:  FAMILY HISTORY:  No pertinent family history in first degree relatives      Allergies:  latex (Unknown)  penicillin (Unknown)      ROS:  Constitutional: Patient offers no complaints of fevers or significant weight loss  Ears, Nose, Mouth and Throat: The patient presents with no abnormalities of the head, ears, eyes, nose or throat  Skin: Patient offers no concerns of new rashes or lesions  Respiratory: The patient presents with no abnormalities of the respiratory tract  Cardiovascular: The patient presents with no cardiac abnormalities  Gastrointestinal: The patient presents with no abnormalities of the GI system  Genitourinary: The patient presents with no dysuria, hematuria or frequent urination  Neurological: See HPI  Endocrine: Patient offers no complaints of excessive thirst, urination, or heat/cold intolerance    Advanced care planning reviewed and noted in the chart.    Medications:  acetaminophen   Tablet .. 650 milliGRAM(s) Oral every 6 hours PRN  guaiFENesin   Syrup  (Sugar-Free) 100 milliGRAM(s) Oral every 6 hours PRN  heparin  Injectable 5000 Unit(s) SubCutaneous every 12 hours  HYDROmorphone  Injectable 0.5 milliGRAM(s) IV Push every 8 hours PRN  labetalol 300 milliGRAM(s) Oral every 12 hours      Labs:  CBC Full  -  ( 2019 09:40 )  WBC Count : 7.4 K/uL  Hemoglobin : 11.8 g/dL  Hematocrit : 35.7 %  Platelet Count - Automated : 288 K/uL  Mean Cell Volume : 91.3 fl  Mean Cell Hemoglobin : 30.0 pg  Mean Cell Hemoglobin Concentration : 32.9 gm/dL  Auto Neutrophil # : x  Auto Lymphocyte # : x  Auto Monocyte # : x  Auto Eosinophil # : x  Auto Basophil # : x  Auto Neutrophil % : x  Auto Lymphocyte % : x  Auto Monocyte % : x  Auto Eosinophil % : x  Auto Basophil % : x        137  |  97  |  38<H>  ----------------------------<  111<H>  4.9   |  22  |  6.55<H>    Ca    9.9      2019 09:30  Phos  5.1       Mg     2.2         TPro  7.1  /  Alb  4.4  /  TBili  0.4  /  DBili  x   /  AST  17  /  ALT  21  /  AlkPhos  61      CAPILLARY BLOOD GLUCOSE        LIVER FUNCTIONS - ( 2019 05:09 )  Alb: 4.4 g/dL / Pro: 7.1 g/dL / ALK PHOS: 61 U/L / ALT: 21 U/L / AST: 17 U/L / GGT: x           PT/INR - ( 2019 05:09 )   PT: 10.1 sec;   INR: 0.88 ratio         PTT - ( 2019 05:09 )  PTT:31.9 sec    Female    Vitals:  Vital Signs Last 24 Hrs  T(C): 36.7 (2019 08:28), Max: 36.8 (2019 16:03)  T(F): 98 (2019 08:28), Max: 98.3 (2019 21:07)  HR: 73 (2019 08:28) (70 - 81)  BP: 152/89 (2019 08:28) (148/86 - 198/98)  BP(mean): --  RR: 18 (2019 08:28) (18 - 18)  SpO2: 95% (2019 08:28) (94% - 97%)    NEUROLOGICAL EXAM:    Mental status: Awake, alert, and in no apparent distress. Oriented to person, place and time. Language function is normal. Recent memory, digit span and concentration were normal.     Cranial Nerves: Pupils were equal, round, reactive to light. Extraocular movements were intact. Visual field were full. Fundoscopic exam was deferred. Facial sensation was intact to light touch. There was no facial asymmetry. The palate was upgoing symmetrically and tongue was midline. Hearing acuity was intact to finger rub AU. Shoulder shrug was full bilaterally    Motor exam: Bulk and tone were normal. Strength was 5/5 in all four extremities. Fine finger movements were symmetric and normal. There was no pronator drift    Reflexes: 2+ in the bilateral upper extremities. 2+ in the bilateral lower extremities. Toes were downgoing bilaterally.     Sensation: Intact to light touch, temperature, vibration and proprioception.     Coordination: Finger-nose-finger and heel-to-shin was without dysmetria.     Gait: Narrow base and steady. Romberg was negative.     IMAGING:  < from: CT Head No Cont (19 @ 23:27) >  FINDINGS:  No acute hemorrhage or infarct is identified. No hydrocephalus, midline shift or extra-axial collections are present.  Incidental note is again made of a partially empty sella.  The orbits, paranasal sinuses and tympanomastoid cavities are not remarkable in appearance.    Impression: No acute hemorrhage hydrocephalus or mass effect. Admitting Diagnosis:  Acute pulmonary edema (J81.0): ACUTE PULMONARY EDEMA    HPI:  47yo F with ESRD (2/2 Membranous Nephropathy, HD on M/W/) and HTN presenting with acute SOB which woke her up from sleep. Until yesterday was in otherwise usual state of health. Overnight, patient was complaining of severe HA. No improvement with Tylenol. Of note, at the time of HA BP was noted to be 190s/100s. At the time, CT Head was ordered but patient refused to go until her pain subsided. She was given Dilaudid with resolution of her pain. Describes as fronto-temporal, bilateral, and severe. Pt states the pain characteristics differ from stabbing to squeezing to pounding. She denies any preceding aura, change in vision, nausea/vomiting.     Currently her HA has resolved but she recently received pain medicine.  Patient reports chronic issue with HA for past several months after HD sessions  ******    Past Medical History:  GERD (gastroesophageal reflux disease) (530.81)  Latex allergy (995.3)  Obesity, Class III, BMI 40-49.9 (morbid obesity) (278.01)  Endometriosis (617.9)  HTN (hypertension) (401.9)  Dyslipidemia (272.4)  Membranous Glomerulonephropathy (583.1)  Chronic Hepatitis B (070.32)  History of Nephrotic Syndrome (V13.03)      Past Surgical History:  Hx of tonsillectomy (V45.89): as a child  Hx of cholecystectomy (V45.79)  H/O  section x2 (V45.89): ,       Social History:  No toxic habits    Family History:  FAMILY HISTORY:  No pertinent family history in first degree relatives      Allergies:  latex (Unknown)  penicillin (Unknown)      ROS:  Constitutional: as per chart    Advanced care planning reviewed and noted in the chart.    Medications:  acetaminophen   Tablet .. 650 milliGRAM(s) Oral every 6 hours PRN  guaiFENesin   Syrup  (Sugar-Free) 100 milliGRAM(s) Oral every 6 hours PRN  heparin  Injectable 5000 Unit(s) SubCutaneous every 12 hours  HYDROmorphone  Injectable 0.5 milliGRAM(s) IV Push every 8 hours PRN  labetalol 300 milliGRAM(s) Oral every 12 hours      Labs:  CBC Full  -  ( 2019 09:40 )  WBC Count : 7.4 K/uL  Hemoglobin : 11.8 g/dL  Hematocrit : 35.7 %  Platelet Count - Automated : 288 K/uL  Mean Cell Volume : 91.3 fl  Mean Cell Hemoglobin : 30.0 pg  Mean Cell Hemoglobin Concentration : 32.9 gm/dL  Auto Neutrophil # : x  Auto Lymphocyte # : x  Auto Monocyte # : x  Auto Eosinophil # : x  Auto Basophil # : x  Auto Neutrophil % : x  Auto Lymphocyte % : x  Auto Monocyte % : x  Auto Eosinophil % : x  Auto Basophil % : x        137  |  97  |  38<H>  ----------------------------<  111<H>  4.9   |  22  |  6.55<H>    Ca    9.9      2019 09:30  Phos  5.1       Mg     2.2         TPro  7.1  /  Alb  4.4  /  TBili  0.4  /  DBili  x   /  AST  17  /  ALT  21  /  AlkPhos  61    Alb: 4.4 g/dL / Pro: 7.1 g/dL / ALK PHOS: 61 U/L / ALT: 21 U/L / AST: 17 U/L / GGT: x         PT/INR - ( 2019 05:09 )   PT: 10.1 sec;   INR: 0.88 ratio    PTT - ( 2019 05:09 )  PTT:31.9 sec    Vitals:  Vital Signs Last 24 Hrs  T(C): 36.7 (2019 08:28), Max: 36.8 (2019 16:03)  T(F): 98 (2019 08:28), Max: 98.3 (2019 21:07)  HR: 73 (2019 08:28) (70 - 81)  BP: 152/89 (2019 08:28) (148/86 - 198/98)  BP(mean): --  RR: 18 (2019 08:28) (18 - 18)  SpO2: 95% (2019 08:28) (94% - 97%)    NEUROLOGICAL EXAM:    Mental status: Awake, alert, and in no apparent distress. Oriented to person, place and time. Language function is normal. Recent memory, digit span and concentration were normal.     Cranial Nerves: Pupils were equal, round, reactive to light. Extraocular movements were intact. Visual field were full. Fundoscopic exam was deferred. Facial sensation was intact to light touch. There was no facial asymmetry. The palate was upgoing symmetrically and tongue was midline. Hearing acuity was intact to finger rub AU. Shoulder shrug was full bilaterally    Motor exam: Bulk and tone were normal. Strength was 5/5 in all four extremities. Fine finger movements were symmetric and normal. There was no pronator drift    Reflexes: 2+ in the bilateral upper extremities. 2+ in the bilateral lower extremities. Toes were downgoing bilaterally.     Sensation: Intact to light touch, temperature,  and proprioception.     Coordination: Finger-nose-finger without dysmetria.     Gait: Narrow base and steady.     IMAGING:  < from: CT Head No Cont (19 @ 23:27) >  FINDINGS:  No acute hemorrhage or infarct is identified. No hydrocephalus, midline shift or extra-axial collections are present.  Incidental note is again made of a partially empty sella.  The orbits, paranasal sinuses and tympanomastoid cavities are not remarkable in appearance.    Impression: No acute hemorrhage hydrocephalus or mass effect.

## 2019-01-22 NOTE — PROGRESS NOTE ADULT - SUBJECTIVE AND OBJECTIVE BOX
Patient is a 48y old  Female who presents with a chief complaint of SOB (22 Jan 2019 15:11)      SUBJECTIVE / OVERNIGHT EVENTS: events noted.  pt had sob and c/o head ache    MEDICATIONS  (STANDING):  heparin  Injectable 5000 Unit(s) SubCutaneous every 12 hours  labetalol 300 milliGRAM(s) Oral every 12 hours    MEDICATIONS  (PRN):  acetaminophen   Tablet .. 650 milliGRAM(s) Oral every 6 hours PRN Mild Pain (1 - 3)  guaiFENesin   Syrup  (Sugar-Free) 100 milliGRAM(s) Oral every 6 hours PRN Cough  HYDROmorphone  Injectable 0.5 milliGRAM(s) IV Push every 8 hours PRN Severe Pain (7 - 10)      Vital Signs Last 24 Hrs  T(C): 36.7 (22 Jan 2019 08:28), Max: 36.8 (21 Jan 2019 16:03)  T(F): 98 (22 Jan 2019 08:28), Max: 98.3 (21 Jan 2019 21:07)  HR: 73 (22 Jan 2019 08:28) (70 - 81)  BP: 152/89 (22 Jan 2019 08:28) (148/86 - 198/98)  BP(mean): --  RR: 18 (22 Jan 2019 08:28) (18 - 18)  SpO2: 95% (22 Jan 2019 08:28) (94% - 97%)  CAPILLARY BLOOD GLUCOSE        I&O's Summary    21 Jan 2019 07:01  -  22 Jan 2019 07:00  --------------------------------------------------------  IN: 0 mL / OUT: 2200 mL / NET: -2200 mL    22 Jan 2019 07:01  -  22 Jan 2019 15:29  --------------------------------------------------------  IN: 500 mL / OUT: 800 mL / NET: -300 mL        PHYSICAL EXAM:  GENERAL: NAD, well-developed  HEAD:  Atraumatic, Normocephalic  EYES: EOMI, PERRLA, conjunctiva and sclera clear  NECK: Supple, No JVD  CHEST/LUNG: Clear to auscultation bilaterally; No wheeze  HEART: Regular rate and rhythm; No murmurs, rubs, or gallops  ABDOMEN: Soft, Nontender, Nondistended; Bowel sounds present  EXTREMITIES:  2+ Peripheral Pulses, No clubbing, cyanosis, or edema  PSYCH: AAOx3  NEUROLOGY: non-focal  SKIN: No rashes or lesions    LABS:                        11.8   7.4   )-----------( 288      ( 22 Jan 2019 09:40 )             35.7     01-22    137  |  97  |  38<H>  ----------------------------<  111<H>  4.9   |  22  |  6.55<H>    Ca    9.9      22 Jan 2019 09:30  Phos  5.1     01-21  Mg     2.2     01-22    TPro  7.1  /  Alb  4.4  /  TBili  0.4  /  DBili  x   /  AST  17  /  ALT  21  /  AlkPhos  61  01-21    PT/INR - ( 21 Jan 2019 05:09 )   PT: 10.1 sec;   INR: 0.88 ratio         PTT - ( 21 Jan 2019 05:09 )  PTT:31.9 sec          RADIOLOGY & ADDITIONAL TESTS:    Imaging Personally Reviewed:    Consultant(s) Notes Reviewed:      Care Discussed with Consultants/Other Providers:

## 2019-01-22 NOTE — CONSULT NOTE ADULT - ASSESSMENT
***To Be Discussed with Attending 47yo F with ESRD (2/2 Membranous Nephropathy, HD on M/W/F) and HTN p/w SOB in the setting of Pulm Edema/Fluid Overload with complaint of persistent chronic HA.    #Mixed Etiology Headache    -HTN management as per Primary Team  -CT Head without acute intracranial pathology    ***To Be Discussed with Attending 47yo F with ESRD (2/2 Membranous Nephropathy, HD on M/W/F) and HTN p/w SOB in the setting of Pulm Edema/Fluid Overload with complaint of persistent chronic HA.    #Acute on Chronic Headache - mixed etiology including HTN and nitrate use  -patient's acute HA likely HTN-mediated and exacerbated by nitrate use  -HTN management as per Primary Team for better control  -CT Head without acute intracranial pathology  -chronic HA since October concerning for Hemodialysis-Induced Headache; would ask Renal for input whether dialysate is possible cause vs role for Magnesium supplementation     ***To Be Discussed with Attending 49yo F with ESRD (2/2 Membranous Nephropathy, HD on M/W/F) and HTN p/w SOB in the setting of Pulm Edema/Fluid Overload with complaint of persistent chronic HA.  -CT Head without acute intracranial pathology    #Acute on Chronic Headache - mixed etiology including HTN and nitrate use  -patient's acute HA likely HTN-mediated and exacerbated by nitrate use  -HTN management as per Primary Team for better control  -chronic HA since October concerning for Hemodialysis-Induced Headache; would ask Renal for input whether dialysate is possible cause vs role for Magnesium supplementation

## 2019-01-22 NOTE — CHART NOTE - NSCHARTNOTEFT_GEN_A_CORE
Patient c/o mild Right ear pain and itching inside ear.   Physical exam deferred as pt prefers to sleep now  ENT consult offered to pt, patient would like ENT consult today  Will sign out to day team    Patricia Levin [facility]  [id number]

## 2019-01-22 NOTE — PROGRESS NOTE ADULT - SUBJECTIVE AND OBJECTIVE BOX
Interval Events:    REVIEW OF SYSTEMS:  Constitutional:   Eyes:  ENT:  CV:  Resp:  GI:  :  MSK:  Integumentary:  Neurological:  Psychiatric:  Endocrine:  Hematologic/Lymphatic:  Allergic/Immunologic:  [x] All other systems negative  [ ] Unable to assess ROS because ________    OBJECTIVE:  ICU Vital Signs Last 24 Hrs  T(C): 36.7 (22 Jan 2019 08:28), Max: 36.8 (21 Jan 2019 16:03)  T(F): 98 (22 Jan 2019 08:28), Max: 98.3 (21 Jan 2019 21:07)  HR: 73 (22 Jan 2019 08:28) (70 - 81)  BP: 152/89 (22 Jan 2019 08:28) (148/86 - 198/98)  BP(mean): --  ABP: --  ABP(mean): --  RR: 18 (22 Jan 2019 08:28) (18 - 18)  SpO2: 95% (22 Jan 2019 08:28) (94% - 97%)        01-21 @ 07:01 - 01-22 @ 07:00  --------------------------------------------------------  IN: 0 mL / OUT: 2200 mL / NET: -2200 mL    01-22 @ 07:01 - 01-22 @ 14:46  --------------------------------------------------------  IN: 500 mL / OUT: 800 mL / NET: -300 mL      CAPILLARY BLOOD GLUCOSE          PHYSICAL EXAM:  General: AAOx3  HEENT: EOMI, PERRL  Lymph Nodes: No LAD  Neck: Supple  Respiratory:   Cardiovascular: RRR   Abdomen: soft, NT/ND  Extremities: no c/c/e    HOSPITAL MEDICATIONS:  MEDICATIONS  (STANDING):  heparin  Injectable 5000 Unit(s) SubCutaneous every 12 hours  labetalol 300 milliGRAM(s) Oral every 12 hours    MEDICATIONS  (PRN):  acetaminophen   Tablet .. 650 milliGRAM(s) Oral every 6 hours PRN Mild Pain (1 - 3)  guaiFENesin   Syrup  (Sugar-Free) 100 milliGRAM(s) Oral every 6 hours PRN Cough  HYDROmorphone  Injectable 0.5 milliGRAM(s) IV Push every 8 hours PRN Severe Pain (7 - 10)      LABS:                        11.8   7.4   )-----------( 288      ( 22 Jan 2019 09:40 )             35.7     01-22    137  |  97  |  38<H>  ----------------------------<  111<H>  4.9   |  22  |  6.55<H>    Ca    9.9      22 Jan 2019 09:30  Phos  5.1     01-21  Mg     2.2     01-22    TPro  7.1  /  Alb  4.4  /  TBili  0.4  /  DBili  x   /  AST  17  /  ALT  21  /  AlkPhos  61  01-21    PT/INR - ( 21 Jan 2019 05:09 )   PT: 10.1 sec;   INR: 0.88 ratio         PTT - ( 21 Jan 2019 05:09 )  PTT:31.9 sec      Venous Blood Gas:  01-21 @ 05:09  7.38/36/49/21/80  VBG Lactate: 1.7      RADIOLOGY:  [x] Reviewed and interpreted by me Interval Events:  patients dyspnea improved post HD    REVIEW OF SYSTEMS:  Constitutional:   Eyes:  ENT:  CV:  Resp: dyspnea  GI:  :  MSK:  Integumentary:  Neurological:  Psychiatric:  Endocrine:  Hematologic/Lymphatic:  Allergic/Immunologic:  [x] All other systems negative  [ ] Unable to assess ROS because ________    OBJECTIVE:  ICU Vital Signs Last 24 Hrs  T(C): 36.7 (22 Jan 2019 08:28), Max: 36.8 (21 Jan 2019 16:03)  T(F): 98 (22 Jan 2019 08:28), Max: 98.3 (21 Jan 2019 21:07)  HR: 73 (22 Jan 2019 08:28) (70 - 81)  BP: 152/89 (22 Jan 2019 08:28) (148/86 - 198/98)  BP(mean): --  ABP: --  ABP(mean): --  RR: 18 (22 Jan 2019 08:28) (18 - 18)  SpO2: 95% (22 Jan 2019 08:28) (94% - 97%)        01-21 @ 07:01 - 01-22 @ 07:00  --------------------------------------------------------  IN: 0 mL / OUT: 2200 mL / NET: -2200 mL    01-22 @ 07:01  - 01-22 @ 14:46  --------------------------------------------------------  IN: 500 mL / OUT: 800 mL / NET: -300 mL      CAPILLARY BLOOD GLUCOSE          PHYSICAL EXAM:  General: AAOx3  HEENT: EOMI, PERRL  Lymph Nodes: No LAD  Neck: Supple  Respiratory: decreased bs at bases  Cardiovascular: RRR   Abdomen: soft, NT/ND  Extremities: no c/c/e    HOSPITAL MEDICATIONS:  MEDICATIONS  (STANDING):  heparin  Injectable 5000 Unit(s) SubCutaneous every 12 hours  labetalol 300 milliGRAM(s) Oral every 12 hours    MEDICATIONS  (PRN):  acetaminophen   Tablet .. 650 milliGRAM(s) Oral every 6 hours PRN Mild Pain (1 - 3)  guaiFENesin   Syrup  (Sugar-Free) 100 milliGRAM(s) Oral every 6 hours PRN Cough  HYDROmorphone  Injectable 0.5 milliGRAM(s) IV Push every 8 hours PRN Severe Pain (7 - 10)      LABS:                        11.8   7.4   )-----------( 288      ( 22 Jan 2019 09:40 )             35.7     01-22    137  |  97  |  38<H>  ----------------------------<  111<H>  4.9   |  22  |  6.55<H>    Ca    9.9      22 Jan 2019 09:30  Phos  5.1     01-21  Mg     2.2     01-22    TPro  7.1  /  Alb  4.4  /  TBili  0.4  /  DBili  x   /  AST  17  /  ALT  21  /  AlkPhos  61  01-21    PT/INR - ( 21 Jan 2019 05:09 )   PT: 10.1 sec;   INR: 0.88 ratio         PTT - ( 21 Jan 2019 05:09 )  PTT:31.9 sec      Venous Blood Gas:  01-21 @ 05:09  7.38/36/49/21/80  VBG Lactate: 1.7      RADIOLOGY:  [x] Reviewed and interpreted by me

## 2019-01-23 DIAGNOSIS — N18.9 CHRONIC KIDNEY DISEASE, UNSPECIFIED: ICD-10-CM

## 2019-01-23 DIAGNOSIS — R51 HEADACHE: ICD-10-CM

## 2019-01-23 DIAGNOSIS — H60.90 UNSPECIFIED OTITIS EXTERNA, UNSPECIFIED EAR: ICD-10-CM

## 2019-01-23 LAB
ANION GAP SERPL CALC-SCNC: 18 MMOL/L — HIGH (ref 5–17)
BUN SERPL-MCNC: 56 MG/DL — HIGH (ref 7–23)
CALCIUM SERPL-MCNC: 9.7 MG/DL — SIGNIFICANT CHANGE UP (ref 8.4–10.5)
CHLORIDE SERPL-SCNC: 99 MMOL/L — SIGNIFICANT CHANGE UP (ref 96–108)
CO2 SERPL-SCNC: 20 MMOL/L — LOW (ref 22–31)
CREAT SERPL-MCNC: 8.12 MG/DL — HIGH (ref 0.5–1.3)
GLUCOSE SERPL-MCNC: 135 MG/DL — HIGH (ref 70–99)
HCT VFR BLD CALC: 35.1 % — SIGNIFICANT CHANGE UP (ref 34.5–45)
HGB BLD-MCNC: 11.6 G/DL — SIGNIFICANT CHANGE UP (ref 11.5–15.5)
MAGNESIUM SERPL-MCNC: 2.3 MG/DL — SIGNIFICANT CHANGE UP (ref 1.6–2.6)
MCHC RBC-ENTMCNC: 30.6 PG — SIGNIFICANT CHANGE UP (ref 27–34)
MCHC RBC-ENTMCNC: 33.2 GM/DL — SIGNIFICANT CHANGE UP (ref 32–36)
MCV RBC AUTO: 92.2 FL — SIGNIFICANT CHANGE UP (ref 80–100)
PLATELET # BLD AUTO: 277 K/UL — SIGNIFICANT CHANGE UP (ref 150–400)
POTASSIUM SERPL-MCNC: 5.4 MMOL/L — HIGH (ref 3.5–5.3)
POTASSIUM SERPL-SCNC: 5.4 MMOL/L — HIGH (ref 3.5–5.3)
RBC # BLD: 3.8 M/UL — SIGNIFICANT CHANGE UP (ref 3.8–5.2)
RBC # FLD: 14.6 % — HIGH (ref 10.3–14.5)
SODIUM SERPL-SCNC: 137 MMOL/L — SIGNIFICANT CHANGE UP (ref 135–145)
WBC # BLD: 5.8 K/UL — SIGNIFICANT CHANGE UP (ref 3.8–10.5)
WBC # FLD AUTO: 5.8 K/UL — SIGNIFICANT CHANGE UP (ref 3.8–10.5)

## 2019-01-23 PROCEDURE — 99254 IP/OBS CNSLTJ NEW/EST MOD 60: CPT

## 2019-01-23 PROCEDURE — 99233 SBSQ HOSP IP/OBS HIGH 50: CPT | Mod: GC

## 2019-01-23 RX ORDER — MAGNESIUM OXIDE 400 MG ORAL TABLET 241.3 MG
400 TABLET ORAL DAILY
Qty: 0 | Refills: 0 | Status: DISCONTINUED | OUTPATIENT
Start: 2019-01-23 | End: 2019-01-23

## 2019-01-23 RX ORDER — MAGNESIUM OXIDE 400 MG ORAL TABLET 241.3 MG
400 TABLET ORAL DAILY
Qty: 0 | Refills: 0 | Status: DISCONTINUED | OUTPATIENT
Start: 2019-01-24 | End: 2019-01-24

## 2019-01-23 RX ADMIN — Medication 300 MILLIGRAM(S): at 09:41

## 2019-01-23 RX ADMIN — HEPARIN SODIUM 5000 UNIT(S): 5000 INJECTION INTRAVENOUS; SUBCUTANEOUS at 21:25

## 2019-01-23 RX ADMIN — HEPARIN SODIUM 5000 UNIT(S): 5000 INJECTION INTRAVENOUS; SUBCUTANEOUS at 09:41

## 2019-01-23 NOTE — CONSULT NOTE ADULT - SUBJECTIVE AND OBJECTIVE BOX
CC: right ear pain  HPI:48y female with PMH of nephrotic syndrome ESRD on dialysis M,W,F and HTN admitted with SOB, C/O right ear pain since yesterday. Pt states the pain is worse when presses on her ear. Nothing makes it better. Pt admits to decrease hearing B/L but denies ear discharge, vertigo, tinnitus, fevers, N/V.        PAST MEDICAL & SURGICAL HISTORY:  GERD (gastroesophageal reflux disease)  Latex allergy  Obesity, Class III, BMI 40-49.9 (morbid obesity)  Endometriosis  HTN (hypertension)  Dyslipidemia  Membranous Glomerulonephropathy  Chronic Hepatitis B  History of Nephrotic Syndrome  Hx of tonsillectomy: as a child  Hx of cholecystectomy  H/O  section x2: ,     Allergies    latex (Unknown)  penicillin (Unknown)    Intolerances      MEDICATIONS  (STANDING):  heparin  Injectable 5000 Unit(s) SubCutaneous every 12 hours  influenza   Vaccine 0.5 milliLiter(s) IntraMuscular once  labetalol 300 milliGRAM(s) Oral every 12 hours    MEDICATIONS  (PRN):  acetaminophen   Tablet .. 650 milliGRAM(s) Oral every 6 hours PRN Mild Pain (1 - 3)  guaiFENesin   Syrup  (Sugar-Free) 100 milliGRAM(s) Oral every 6 hours PRN Cough  HYDROmorphone  Injectable 0.5 milliGRAM(s) IV Push every 8 hours PRN Severe Pain (7 - 10)      Social History:     Family history:     ROS:   ENT: all negative except as noted in HPI   CV: denies palpitations  Pulm: denies SOB, cough, hemoptysis  GI: denies change in apetite, indigestion, n/v  : denies pertinent urinary symptoms, urgency  Neuro: denies numbness/tingling, loss of sensation  Psych: denies anxiety  MS: denies muscle weakness, instability  Heme: denies easy bruising or bleeding  Endo: denies heat/cold intolerance, excessive sweating  Vascular: denies LE edema    Vital Signs Last 24 Hrs  T(C): 36.9 (2019 11:33), Max: 36.9 (2019 11:33)  T(F): 98.5 (2019 11:33), Max: 98.5 (2019 11:33)  HR: 64 (2019 11:33) (64 - 67)  BP: 143/86 (2019 11:33) (130/79 - 162/91)  BP(mean): --  RR: 18 (2019 11:33) (18 - 18)  SpO2: 98% (2019 11:33) (95% - 98%)                          11.6   5.8   )-----------( 277      ( 2019 09:30 )             35.1        137  |  99  |  56<H>  ----------------------------<  135<H>  5.4<H>   |  20<L>  |  8.12<H>    Ca    9.7      2019 09:28  Mg     2.3              PHYSICAL EXAM:  Gen: NAD  Skin: No rashes, bruises, or lesions  Head: Normocephalic, Atraumatic  Face: no edema, erythema, or fluctuance. Parotid glands soft without mass  Eyes: no scleral injection  Ears: Right - ear canal clear, TM intact without effusion or erythema. No evidence of any fluid drainage. No mastoid tenderness, erythema, or ear bulging            Left - ear canal clear, TM intact without effusion or erythema. No evidence of any fluid drainage. No mastoid tenderness, erythema, or ear bulging  Nose: Nares bilaterally patent, no discharge  Mouth: No Stridor / Drooling / Trismus.  Mucosa moist, tongue/uvula midline, oropharynx clear  Neck: Flat, supple, no lymphadenopathy, trachea midline, no masses  Lymphatic: No lymphadenopathy  Resp: breathing easily, no stridor  CV: no peripheral edema/cyanosis  GI: nondistended   Peripheral vascular: no JVD or edema  Neuro: facial nerve intact, no facial droop        Diagnostic Nasal Endoscopy: (Scope #2 used)    Fiberoptic Indirect laryngoscopy:  (Scope #2 used)        IMAGING/ADDITIONAL STUDIES: CC: right ear pain  HPI:48y female with PMH of nephrotic syndrome ESRD on dialysis M,W,F and HTN admitted with SOB, C/O right ear pain since yesterday. Pt states the pain is worse when presses on her ear. Nothing makes it better. Pt admits to decrease hearing B/L but denies ear discharge, vertigo, tinnitus, fevers, N/V.        PAST MEDICAL & SURGICAL HISTORY:  GERD (gastroesophageal reflux disease)  Latex allergy  Obesity, Class III, BMI 40-49.9 (morbid obesity)  Endometriosis  HTN (hypertension)  Dyslipidemia  Membranous Glomerulonephropathy  Chronic Hepatitis B  History of Nephrotic Syndrome  Hx of tonsillectomy: as a child  Hx of cholecystectomy  H/O  section x2: ,     Allergies    latex (Unknown)  penicillin (Unknown)    Intolerances      MEDICATIONS  (STANDING):  heparin  Injectable 5000 Unit(s) SubCutaneous every 12 hours  influenza   Vaccine 0.5 milliLiter(s) IntraMuscular once  labetalol 300 milliGRAM(s) Oral every 12 hours    MEDICATIONS  (PRN):  acetaminophen   Tablet .. 650 milliGRAM(s) Oral every 6 hours PRN Mild Pain (1 - 3)  guaiFENesin   Syrup  (Sugar-Free) 100 milliGRAM(s) Oral every 6 hours PRN Cough  HYDROmorphone  Injectable 0.5 milliGRAM(s) IV Push every 8 hours PRN Severe Pain (7 - 10)      Social History:     Family history:     ROS:   ENT: all negative except as noted in HPI   CV: denies palpitations  Pulm: denies SOB, cough, hemoptysis  GI: denies change in apetite, indigestion, n/v  : denies pertinent urinary symptoms, urgency  Neuro: denies numbness/tingling, loss of sensation  Psych: denies anxiety  MS: denies muscle weakness, instability  Heme: denies easy bruising or bleeding  Endo: denies heat/cold intolerance, excessive sweating  Vascular: denies LE edema    Vital Signs Last 24 Hrs  T(C): 36.9 (2019 11:33), Max: 36.9 (2019 11:33)  T(F): 98.5 (2019 11:33), Max: 98.5 (2019 11:33)  HR: 64 (2019 11:33) (64 - 67)  BP: 143/86 (2019 11:33) (130/79 - 162/91)  BP(mean): --  RR: 18 (2019 11:33) (18 - 18)  SpO2: 98% (2019 11:33) (95% - 98%)                          11.6   5.8   )-----------( 277      ( 2019 09:30 )             35.1        137  |  99  |  56<H>  ----------------------------<  135<H>  5.4<H>   |  20<L>  |  8.12<H>    Ca    9.7      2019 09:28  Mg     2.3              PHYSICAL EXAM:  Gen: NAD  Skin: No rashes, bruises, or lesions  Head: Normocephalic, Atraumatic  Face: no edema, erythema, or fluctuance. Parotid glands soft without mass  Eyes: no scleral injection  Ears: Right - Fullness noted at 3o'clock in the EAC, TTP, + tragal tenderness, no purulence, no bleeding, TM intact without effusion or erythema. No evidence of any fluid drainage. No mastoid tenderness, erythema, or ear bulging            Left - ear canal clear, TM intact without effusion or erythema. No evidence of any fluid drainage. No mastoid tenderness, erythema, or ear bulging  Nose: Nares bilaterally patent, no discharge  Mouth: No Stridor / Drooling / Trismus.  Mucosa moist, tongue/uvula midline, oropharynx clear  Neck: Flat, supple, no lymphadenopathy, trachea midline, no masses  Lymphatic: No lymphadenopathy  Resp: breathing easily, no stridor  CV: no peripheral edema/cyanosis  GI: nondistended   Peripheral vascular: no JVD or edema  Neuro: facial nerve intact, no facial droop

## 2019-01-23 NOTE — PROGRESS NOTE ADULT - SUBJECTIVE AND OBJECTIVE BOX
[facility] DIVISION OF KIDNEY DISEASES AND HYPERTENSION -- HEMODIALYSIS NOTE  --------------------------------------------------------------------------------  Chief Complaint: ESRD/Ongoing hemodialysis requirement    24 hour events/subjective:  Pt feels well this AM. Reports she had a headache after dialysis on Monday and all day yesterday.  Denies dyspnea. Also reports urinating ~1 L this AM.         PAST HISTORY  --------------------------------------------------------------------------------  No significant changes to PMH, PSH, FHx, SHx, unless otherwise noted    ALLERGIES & MEDICATIONS  --------------------------------------------------------------------------------  Allergies    latex (Unknown)  penicillin (Unknown)    Intolerances      Standing Inpatient Medications  heparin  Injectable 5000 Unit(s) SubCutaneous every 12 hours  influenza   Vaccine 0.5 milliLiter(s) IntraMuscular once  labetalol 300 milliGRAM(s) Oral every 12 hours  magnesium oxide 400 milliGRAM(s) Oral daily    PRN Inpatient Medications  acetaminophen   Tablet .. 650 milliGRAM(s) Oral every 6 hours PRN  guaiFENesin   Syrup  (Sugar-Free) 100 milliGRAM(s) Oral every 6 hours PRN  HYDROmorphone  Injectable 0.5 milliGRAM(s) IV Push every 8 hours PRN      REVIEW OF SYSTEMS    Gen: No weight changes, fatigue, fevers/chills  Skin: No rashes  Head/Eyes/Ears/Mouth: + headache  Respiratory: no dyspnea, no cough  CV: No chest pain  GI: No abdominal pain, diarrhea, constipation, nausea, vomiting  : No increased frequency  MSK: No joint pain/swelling  Neuro: No dizziness/lightheadedness      All other systems were reviewed and are negative, except as noted.    VITALS/PHYSICAL EXAM  --------------------------------------------------------------------------------  T(C): 36.8 (01-23-19 @ 09:43), Max: 36.8 (01-23-19 @ 09:43)  HR: 67 (01-23-19 @ 09:43) (64 - 67)  BP: 130/79 (01-23-19 @ 09:43) (130/79 - 162/91)  RR: 18 (01-23-19 @ 09:43) (18 - 18)  SpO2: 95% (01-23-19 @ 09:43) (95% - 95%)  Wt(kg): --        01-22-19 @ 07:01  -  01-23-19 @ 07:00  --------------------------------------------------------  IN: 500 mL / OUT: 800 mL / NET: -300 mL    01-23-19 @ 07:01  -  01-23-19 @ 11:16  --------------------------------------------------------  IN: 0 mL / OUT: 1350 mL / NET: -1350 mL      Physical Exam:  	Gen: NAD  	Pulm: cta b/l  	CV: RRR, S1S2; no rub  	Abd: +BS, soft, nontender/nondistended  	UE: Warm, FROM, no edema  	LE: Warm, no edema  	Neuro: awake and alert  	Psych: Normal affect and mood  	Skin: Warm, without rashes  	Vascular access: +LUE AVF +thrill +bruit +skin intact; +right chest tunneled catheter- no ttp    LABS/STUDIES  --------------------------------------------------------------------------------              11.6   5.8   >-----------<  277      [01-23-19 @ 09:30]              35.1     137  |  99  |  56  ----------------------------<  135      [01-23-19 @ 09:28]  5.4   |  20  |  8.12        Ca     9.7     [01-23-19 @ 09:28]      Mg     2.2     [01-22-19 @ 09:30]            Iron 69, TIBC --, %sat --      [10-16-18 @ 09:31]  Ferritin 177      [10-16-18 @ 08:17]  PTH -- (Ca 8.6)      [08-12-18 @ 16:59]   281  Vitamin D (25OH) 12.3      [08-12-18 @ 16:59]  HbA1c 5.0      [08-12-18 @ 15:31]  TSH 8.52      [08-13-18 @ 08:15]  Lipid: chol 257, , HDL 32,       [08-12-18 @ 09:03]

## 2019-01-23 NOTE — PROGRESS NOTE ADULT - SUBJECTIVE AND OBJECTIVE BOX
Patient is a 48y old  Female who presents with a chief complaint of sob (23 Jan 2019 11:15)      SUBJECTIVE / OVERNIGHT EVENTS:  head ache has resolved.  was seen by neurology. No chest pain. No shortness of breath. No complaints. No events overnight.     MEDICATIONS  (STANDING):  heparin  Injectable 5000 Unit(s) SubCutaneous every 12 hours  influenza   Vaccine 0.5 milliLiter(s) IntraMuscular once  labetalol 300 milliGRAM(s) Oral every 12 hours    MEDICATIONS  (PRN):  acetaminophen   Tablet .. 650 milliGRAM(s) Oral every 6 hours PRN Mild Pain (1 - 3)  guaiFENesin   Syrup  (Sugar-Free) 100 milliGRAM(s) Oral every 6 hours PRN Cough  HYDROmorphone  Injectable 0.5 milliGRAM(s) IV Push every 8 hours PRN Severe Pain (7 - 10)      Vital Signs Last 24 Hrs  T(C): 36.9 (23 Jan 2019 11:33), Max: 36.9 (23 Jan 2019 11:33)  T(F): 98.5 (23 Jan 2019 11:33), Max: 98.5 (23 Jan 2019 11:33)  HR: 64 (23 Jan 2019 11:33) (64 - 67)  BP: 143/86 (23 Jan 2019 11:33) (130/79 - 162/91)  BP(mean): --  RR: 18 (23 Jan 2019 11:33) (18 - 18)  SpO2: 98% (23 Jan 2019 11:33) (95% - 98%)  CAPILLARY BLOOD GLUCOSE        I&O's Summary    22 Jan 2019 07:01  -  23 Jan 2019 07:00  --------------------------------------------------------  IN: 500 mL / OUT: 800 mL / NET: -300 mL    23 Jan 2019 07:01  -  23 Jan 2019 14:13  --------------------------------------------------------  IN: 600 mL / OUT: 1350 mL / NET: -750 mL        PHYSICAL EXAM:  GENERAL: NAD, well-developed  HEAD:  Atraumatic, Normocephalic  EYES: EOMI, PERRLA, conjunctiva and sclera clear  NECK: Supple, No JVD  CHEST/LUNG: Clear to auscultation bilaterally; No wheeze  HEART: Regular rate and rhythm; No murmurs, rubs, or gallops  ABDOMEN: Soft, Nontender, Nondistended; Bowel sounds present  EXTREMITIES:  2+ Peripheral Pulses, No clubbing, cyanosis, or edema  PSYCH: AAOx3  NEUROLOGY: non-focal  SKIN: No rashes or lesions    LABS:                        11.6   5.8   )-----------( 277      ( 23 Jan 2019 09:30 )             35.1     01-23    137  |  99  |  56<H>  ----------------------------<  135<H>  5.4<H>   |  20<L>  |  8.12<H>    Ca    9.7      23 Jan 2019 09:28  Mg     2.3     01-23                RADIOLOGY & ADDITIONAL TESTS:    Imaging Personally Reviewed:    Consultant(s) Notes Reviewed:      Care Discussed with Consultants/Other Providers:

## 2019-01-23 NOTE — CONSULT NOTE ADULT - ASSESSMENT
48y female with PMH of nephrotic syndrome ESRD on dialysis M,W,F and HTN admitted with SOB, C/O right ear pain since yesterday. Exam reveal early otitis externa. No abscess 48y female with PMH of nephrotic syndrome ESRD on dialysis M,W,F and HTN admitted with SOB, C/O right ear pain since yesterday. Exam reveal early otitis externa. No abscess  should follow up for formal audio for decreased hearing after infection cleared

## 2019-01-23 NOTE — PROGRESS NOTE ADULT - SUBJECTIVE AND OBJECTIVE BOX
Admitting Diagnosis:  Acute pulmonary edema (J81.0): ACUTE PULMONARY EDEMA    HPI:  49yo F with ESRD (2/2 Membranous Nephropathy, HD on M/W/) and HTN presenting with acute SOB which woke her up from sleep. Until yesterday was in otherwise usual state of health. Overnight, patient was complaining of severe HA. No improvement with Tylenol. Of note, at the time of HA BP was noted to be 190s/100s. At the time, CT Head was ordered but patient refused to go until her pain subsided. She was given Dilaudid with resolution of her pain. Describes as fronto-temporal, bilateral, and severe. Pt states the pain characteristics differ from stabbing to squeezing to pounding. She denies any preceding aura, change in vision, nausea/vomiting.     Currently her HA has resolved but she recently received pain medicine.  Patient reports chronic issue with HA for past several months after HD sessions  also some neck pain on the left chronic  ******    Past Medical History:  GERD (gastroesophageal reflux disease) (530.81)  Latex allergy (995.3)  Obesity, Class III, BMI 40-49.9 (morbid obesity) (278.01)  Endometriosis (617.9)  HTN (hypertension) (401.9)  Dyslipidemia (272.4)  Membranous Glomerulonephropathy (583.1)  Chronic Hepatitis B (070.32)  History of Nephrotic Syndrome (V13.03)      Past Surgical History:  Hx of tonsillectomy (V45.89): as a child  Hx of cholecystectomy (V45.79)  H/O  section x2 (V45.89): ,       Social History:  No toxic habits    Family History:  FAMILY HISTORY:  No pertinent family history in first degree relatives      Allergies:  latex (Unknown)  penicillin (Unknown)      ROS:  Constitutional: as per chart    Advanced care planning reviewed and noted in the chart.    Medications:  acetaminophen   Tablet .. 650 milliGRAM(s) Oral every 6 hours PRN  guaiFENesin   Syrup  (Sugar-Free) 100 milliGRAM(s) Oral every 6 hours PRN  heparin  Injectable 5000 Unit(s) SubCutaneous every 12 hours  HYDROmorphone  Injectable 0.5 milliGRAM(s) IV Push every 8 hours PRN  influenza   Vaccine 0.5 milliLiter(s) IntraMuscular once  labetalol 300 milliGRAM(s) Oral every 12 hours      Labs:  CBC Full  -  ( 2019 09:40 )  WBC Count : 7.4 K/uL  Hemoglobin : 11.8 g/dL  Hematocrit : 35.7 %  Platelet Count - Automated : 288 K/uL  Mean Cell Volume : 91.3 fl  Mean Cell Hemoglobin : 30.0 pg  Mean Cell Hemoglobin Concentration : 32.9 gm/dL  Auto Neutrophil # : x  Auto Lymphocyte # : x  Auto Monocyte # : x  Auto Eosinophil # : x  Auto Basophil # : x  Auto Neutrophil % : x  Auto Lymphocyte % : x  Auto Monocyte % : x  Auto Eosinophil % : x  Auto Basophil % : x        137  |  97  |  38<H>  ----------------------------<  111<H>  4.9   |  22  |  6.55<H>    Ca    9.9      2019 09:30  Mg     2.2           CAPILLARY BLOOD GLUCOSE                  Vitals:  Vital Signs Last 24 Hrs  T(C): 36.6 (2019 20:24), Max: 36.6 (2019 20:24)  T(F): 97.9 (2019 20:24), Max: 97.9 (2019 20:24)  HR: 64 (2019 20:24) (64 - 64)  BP: 162/91 (2019 20:24) (162/91 - 162/91)  BP(mean): --  RR: 18 (2019 20:24) (18 - 18)  SpO2: 95% (2019 20:24) (95% - 95%)    NEUROLOGICAL EXAM:    Mental status: Awake, alert, and in no apparent distress. Oriented to person, place and time. Language function is normal. Recent memory, digit span and concentration were normal.     Cranial Nerves: Pupils were equal, round, reactive to light. Extraocular movements were intact. Visual field were full. Fundoscopic exam was deferred. Facial sensation was intact to light touch. There was no facial asymmetry. The palate was upgoing symmetrically and tongue was midline. Hearing acuity was intact to finger rub AU. Shoulder shrug was full bilaterally    Motor exam: Bulk and tone were normal. Strength was 5/5 in all four extremities. Fine finger movements were symmetric and normal. There was no pronator drift    Reflexes: 2+ in the bilateral upper extremities. 2+ in the bilateral lower extremities. Toes were downgoing bilaterally.     Sensation: Intact to light touch, temperature,  and proprioception.     Coordination: Finger-nose-finger without dysmetria.     Gait: Narrow base and steady.   pain on palpation of neck on the left    IMAGING:  < from: CT Head No Cont (19 @ 23:27) >  FINDINGS:  No acute hemorrhage or infarct is identified. No hydrocephalus, midline shift or extra-axial collections are present.  Incidental note is again made of a partially empty sella.  The orbits, paranasal sinuses and tympanomastoid cavities are not remarkable in appearance.    Impression: No acute hemorrhage hydrocephalus or mass effect.

## 2019-01-23 NOTE — CONSULT NOTE ADULT - ATTENDING COMMENTS
Pt seen and examined with team at time of service, I was physically present for the key portions for evaluation and management (E/M) service provided, and preformed key portions of the procedure. Agree with above. Plan discussed with primary team.
Seen and examined. Agree with above assessment and plan as per resident note  49yo F with ESRD (2/2 Membranous Nephropathy, HD on M/W/F) and HTN p/w SOB in the setting of Pulm Edema/Fluid Overload with complaint of acute on chronic HA.  -CT Head without acute intracranial pathology. Exam nonfocal    Suspect headache secondary to hypertensive urgency and then side effect of nitrate.   Advised to discuss options for hemodialysis headache with renal. Continue optimize hypertension control  Tylenol as needed but does not provide much relief. Unable to take NSAIDs.  No further inpatient neuro reccs at this time.  Plan reviewed with pt at bedside.
I have seen this patient with the fellow and agree with their assessment and plan. In addition, ESRD for membranous nephropathy likely 2/2 Hep B. On HD MWF at [facility] w/ Dr. Nunn. Started HD 10/18/18. Had LUE AVF created 10/17/18, still not mature yet. Had HD Friday. Now p/w SOB. Will plan for HD w/ UF today 2kg  -rule out other causes of shortness of breath if doesn't improve with UF/HD      Dinah Davies MD  Cell   Pager   Office

## 2019-01-23 NOTE — PROGRESS NOTE ADULT - PROBLEM SELECTOR PLAN 1
renal failure 2/2 membranous nephropathy likely 2/2 Hep B. Started HD 10/18/18- MWF at [facility] w/ Dr. Nunn.  Had LUE AVF created 10/17/18, still not mature yet. Last HD on 1/21. Scheduled for maintenance HD today. Pt requesting 0 UF as she gets headaches with UF and also because she still makes a lot of urine. UOP documented as 1350 ml for today. Pt euvolemic on exam. Will do HD with 0 UF today. She is on lasix 40 mg daily at home, likely will need higher doses of diuretics. Will discuss with her primary nephrologist.    Monitor BMP and UOP. Daily weights. renal failure 2/2 membranous nephropathy likely 2/2 Hep B. Started HD 10/18/18- MWF at [facility] w/ Dr. Nunn.  Had LUE AVF created 10/17/18, still not mature yet. Last HD on 1/21. Scheduled for maintenance HD today. Pt requesting 0 UF as she gets headaches with UF and also because she still makes a lot of urine. UOP documented as 1350 ml for today. Pt euvolemic on exam. Will do HD with 0 UF today. She is on lasix 40 mg PRN  at home, likely will need higher doses of diuretics. Will discuss with her primary nephrologist.    Monitor BMP and UOP. Daily weights.

## 2019-01-23 NOTE — PROGRESS NOTE ADULT - PROBLEM SELECTOR PLAN 5
pt reports headache with HD/UF.  Will get Mg levels today (pre HD).   HD with low BFR. pt reports headache with HD/UF. dialysis induced?   Will get Mg levels today (pre HD) and start supplementation if levels are low.  HD with low BFR. pt reports headache with HD/UF. dialysis induced?   Will get Mg levels today (pre HD) and start supplementation ONLY if levels are low.  Please do not give Mg pre HD  Will also do HD with low BFR.

## 2019-01-24 ENCOUNTER — INBOUND DOCUMENT (OUTPATIENT)
Age: 49
End: 2019-01-24

## 2019-01-24 ENCOUNTER — TRANSCRIPTION ENCOUNTER (OUTPATIENT)
Age: 49
End: 2019-01-24

## 2019-01-24 VITALS
HEART RATE: 64 BPM | DIASTOLIC BLOOD PRESSURE: 85 MMHG | SYSTOLIC BLOOD PRESSURE: 133 MMHG | OXYGEN SATURATION: 98 % | RESPIRATION RATE: 18 BRPM | TEMPERATURE: 98 F

## 2019-01-24 LAB
ALBUMIN SERPL ELPH-MCNC: 4 G/DL — SIGNIFICANT CHANGE UP (ref 3.3–5)
ALP SERPL-CCNC: 66 U/L — SIGNIFICANT CHANGE UP (ref 40–120)
ALT FLD-CCNC: 24 U/L — SIGNIFICANT CHANGE UP (ref 10–45)
ANION GAP SERPL CALC-SCNC: 15 MMOL/L — SIGNIFICANT CHANGE UP (ref 5–17)
AST SERPL-CCNC: 15 U/L — SIGNIFICANT CHANGE UP (ref 10–40)
BILIRUB SERPL-MCNC: 0.4 MG/DL — SIGNIFICANT CHANGE UP (ref 0.2–1.2)
BUN SERPL-MCNC: 39 MG/DL — HIGH (ref 7–23)
CALCIUM SERPL-MCNC: 9.6 MG/DL — SIGNIFICANT CHANGE UP (ref 8.4–10.5)
CHLORIDE SERPL-SCNC: 99 MMOL/L — SIGNIFICANT CHANGE UP (ref 96–108)
CO2 SERPL-SCNC: 24 MMOL/L — SIGNIFICANT CHANGE UP (ref 22–31)
CREAT SERPL-MCNC: 6.15 MG/DL — HIGH (ref 0.5–1.3)
GLUCOSE SERPL-MCNC: 96 MG/DL — SIGNIFICANT CHANGE UP (ref 70–99)
HCT VFR BLD CALC: 33.6 % — LOW (ref 34.5–45)
HGB BLD-MCNC: 11.6 G/DL — SIGNIFICANT CHANGE UP (ref 11.5–15.5)
MCHC RBC-ENTMCNC: 31.1 PG — SIGNIFICANT CHANGE UP (ref 27–34)
MCHC RBC-ENTMCNC: 34.5 GM/DL — SIGNIFICANT CHANGE UP (ref 32–36)
MCV RBC AUTO: 90.2 FL — SIGNIFICANT CHANGE UP (ref 80–100)
PLATELET # BLD AUTO: 248 K/UL — SIGNIFICANT CHANGE UP (ref 150–400)
POTASSIUM SERPL-MCNC: 4.6 MMOL/L — SIGNIFICANT CHANGE UP (ref 3.5–5.3)
POTASSIUM SERPL-SCNC: 4.6 MMOL/L — SIGNIFICANT CHANGE UP (ref 3.5–5.3)
PROT SERPL-MCNC: 7 G/DL — SIGNIFICANT CHANGE UP (ref 6–8.3)
RBC # BLD: 3.72 M/UL — LOW (ref 3.8–5.2)
RBC # FLD: 14.1 % — SIGNIFICANT CHANGE UP (ref 10.3–14.5)
SODIUM SERPL-SCNC: 138 MMOL/L — SIGNIFICANT CHANGE UP (ref 135–145)
WBC # BLD: 5 K/UL — SIGNIFICANT CHANGE UP (ref 3.8–10.5)
WBC # FLD AUTO: 5 K/UL — SIGNIFICANT CHANGE UP (ref 3.8–10.5)

## 2019-01-24 PROCEDURE — 70450 CT HEAD/BRAIN W/O DYE: CPT

## 2019-01-24 PROCEDURE — 83605 ASSAY OF LACTIC ACID: CPT

## 2019-01-24 PROCEDURE — 85027 COMPLETE CBC AUTOMATED: CPT

## 2019-01-24 PROCEDURE — 85730 THROMBOPLASTIN TIME PARTIAL: CPT

## 2019-01-24 PROCEDURE — 96374 THER/PROPH/DIAG INJ IV PUSH: CPT

## 2019-01-24 PROCEDURE — 83880 ASSAY OF NATRIURETIC PEPTIDE: CPT

## 2019-01-24 PROCEDURE — 87798 DETECT AGENT NOS DNA AMP: CPT

## 2019-01-24 PROCEDURE — 84295 ASSAY OF SERUM SODIUM: CPT

## 2019-01-24 PROCEDURE — 99261: CPT

## 2019-01-24 PROCEDURE — 85610 PROTHROMBIN TIME: CPT

## 2019-01-24 PROCEDURE — 84132 ASSAY OF SERUM POTASSIUM: CPT

## 2019-01-24 PROCEDURE — 84100 ASSAY OF PHOSPHORUS: CPT

## 2019-01-24 PROCEDURE — 82435 ASSAY OF BLOOD CHLORIDE: CPT

## 2019-01-24 PROCEDURE — 82947 ASSAY GLUCOSE BLOOD QUANT: CPT

## 2019-01-24 PROCEDURE — 85014 HEMATOCRIT: CPT

## 2019-01-24 PROCEDURE — 80053 COMPREHEN METABOLIC PANEL: CPT

## 2019-01-24 PROCEDURE — 94640 AIRWAY INHALATION TREATMENT: CPT

## 2019-01-24 PROCEDURE — 84702 CHORIONIC GONADOTROPIN TEST: CPT

## 2019-01-24 PROCEDURE — 93005 ELECTROCARDIOGRAM TRACING: CPT

## 2019-01-24 PROCEDURE — 82330 ASSAY OF CALCIUM: CPT

## 2019-01-24 PROCEDURE — 71045 X-RAY EXAM CHEST 1 VIEW: CPT

## 2019-01-24 PROCEDURE — 94660 CPAP INITIATION&MGMT: CPT

## 2019-01-24 PROCEDURE — 87486 CHLMYD PNEUM DNA AMP PROBE: CPT

## 2019-01-24 PROCEDURE — 80048 BASIC METABOLIC PNL TOTAL CA: CPT

## 2019-01-24 PROCEDURE — 83735 ASSAY OF MAGNESIUM: CPT

## 2019-01-24 PROCEDURE — 99291 CRITICAL CARE FIRST HOUR: CPT | Mod: 25

## 2019-01-24 PROCEDURE — 82803 BLOOD GASES ANY COMBINATION: CPT

## 2019-01-24 PROCEDURE — 84484 ASSAY OF TROPONIN QUANT: CPT

## 2019-01-24 PROCEDURE — 87633 RESP VIRUS 12-25 TARGETS: CPT

## 2019-01-24 PROCEDURE — 87581 M.PNEUMON DNA AMP PROBE: CPT

## 2019-01-24 RX ORDER — OFLOXACIN 0.3 %
1 DROPS OPHTHALMIC (EYE)
Qty: 0 | Refills: 0 | Status: DISCONTINUED | OUTPATIENT
Start: 2019-01-24 | End: 2019-01-24

## 2019-01-24 RX ORDER — LABETALOL HCL 100 MG
1 TABLET ORAL
Qty: 60 | Refills: 0
Start: 2019-01-24 | End: 2019-02-22

## 2019-01-24 RX ORDER — FUROSEMIDE 40 MG
1 TABLET ORAL
Qty: 30 | Refills: 0
Start: 2019-01-24 | End: 2019-02-22

## 2019-01-24 RX ORDER — OFLOXACIN 0.3 %
2 DROPS OPHTHALMIC (EYE)
Qty: 0 | Refills: 0 | Status: DISCONTINUED | OUTPATIENT
Start: 2019-01-24 | End: 2019-01-24

## 2019-01-24 RX ORDER — SEVELAMER CARBONATE 2400 MG/1
1 POWDER, FOR SUSPENSION ORAL
Qty: 90 | Refills: 0
Start: 2019-01-24 | End: 2019-02-22

## 2019-01-24 RX ORDER — OFLOXACIN 0.3 %
2 DROPS OPHTHALMIC (EYE)
Qty: 1 | Refills: 0
Start: 2019-01-24 | End: 2019-01-30

## 2019-01-24 RX ADMIN — HYDROMORPHONE HYDROCHLORIDE 0.5 MILLIGRAM(S): 2 INJECTION INTRAMUSCULAR; INTRAVENOUS; SUBCUTANEOUS at 01:59

## 2019-01-24 RX ADMIN — HYDROMORPHONE HYDROCHLORIDE 0.5 MILLIGRAM(S): 2 INJECTION INTRAMUSCULAR; INTRAVENOUS; SUBCUTANEOUS at 03:00

## 2019-01-24 RX ADMIN — HEPARIN SODIUM 5000 UNIT(S): 5000 INJECTION INTRAVENOUS; SUBCUTANEOUS at 10:28

## 2019-01-24 RX ADMIN — Medication 300 MILLIGRAM(S): at 10:26

## 2019-01-24 NOTE — DISCHARGE NOTE ADULT - SECONDARY DIAGNOSIS.
HTN (hypertension) ESRD (end stage renal disease) on dialysis Headache Otitis externa Hyperphosphatemia

## 2019-01-24 NOTE — PROGRESS NOTE ADULT - ATTENDING COMMENTS
Seen and examined. Agree with above assessment and plan as per resident note  47yo F with ESRD (2/2 Membranous Nephropathy, HD on M/W/F) and HTN p/w SOB in the setting of Pulm Edema/Fluid Overload with complaint of acute on chronic HA.  -CT Head without acute intracranial pathology. Exam nonfocal    Suspect headache secondary to hypertensive urgency and then side effect of nitrate.   Advised to discuss options for hemodialysis headache with renal. Continue optimize hypertension control  Tylenol as needed but does not provide much relief. Unable to take NSAIDs.  No further inpatient neuro reccs at this time except will try magnesium prior to hd, which was done on 1/23  often changing HD parameters it is possible to diminish the headaches  pt with neck pain, can get pt as outpt  Plan reviewed with pt at bedside.  no neuro c/i to d/c
Seen and examined. Agree with above assessment and plan as per resident note  49yo F with ESRD (2/2 Membranous Nephropathy, HD on M/W/F) and HTN p/w SOB in the setting of Pulm Edema/Fluid Overload with complaint of acute on chronic HA.  -CT Head without acute intracranial pathology. Exam nonfocal    Suspect headache secondary to hypertensive urgency and then side effect of nitrate.   Advised to discuss options for hemodialysis headache with renal. Continue optimize hypertension control  Tylenol as needed but does not provide much relief. Unable to take NSAIDs.  No further inpatient neuro reccs at this time except will try magnesium prior to hd today  pt with neck pain, can get pt as outpt  Plan reviewed with pt at bedside.
48 year old woman with nephrotic syndrome diagnosed in 2010 on HD since 10/2018 presents with increasing dyspnea, BNP found to be greater than 19,000.  CXR consistent with pulmonary edema.  She improved markedly post HD with removal of 1.3 L fluid,making pulmonary edema likely cause of her symptoms.  She reports dietary indiscretion day before.  Agree with current management.  If dyspnea worsens or persists would need to consider PE in our differential given history of nephrotic syndrome.  Please reconsult as needed.

## 2019-01-24 NOTE — DISCHARGE NOTE ADULT - HOSPITAL COURSE
48y female with PMH of nephrotic syndrome ESRD on dialysis M,W,F and HTN presenting with SOB. Started this evening woke her up from sleep.  Worse when supine. Was generally not feeling well yesterday.  Denies chest pain, cough, n/v/d, abdominal pain, LE swelling.  Still makes urine.  Medications: labetalol and lasix.    acute pulm edema secondary to fluid overload due to ESRD  hyperkalemia  - s/p lasix  - HD per renal    HTN  - cw labetalol    head ache  - secondary to nitrates  - Dilaudid  prn  - ct head is normal  - neuro eval noted 48y female with PMH of nephrotic syndrome ESRD on dialysis M,W,F and HTN presenting with SOB. Started this evening woke her up from sleep.  Worse when supine. Was generally not feeling well yesterday.  Denies chest pain, cough, n/v/d, abdominal pain, LE swelling.  Still makes urine.  Medications: labetalol and lasix.    acute pulm edema secondary to fluid overload due to ESRD  hyperkalemia. Pt given 80 IV lasix and HD. Conditions improved. Followed by Nephrology. Discharged on lasix 120mg daily and HD as scheduled M W F     HTN  - cw labetalol increased to BID    head ache  - secondary to nitrates  - Dilaudid  prn  - ct head is normal. Followed by neuro

## 2019-01-24 NOTE — DISCHARGE NOTE ADULT - PATIENT PORTAL LINK FT
You can access the [facility], offered by [facility], by registering with the following website: [email or web address]

## 2019-01-24 NOTE — PROGRESS NOTE ADULT - SUBJECTIVE AND OBJECTIVE BOX
Admitting Diagnosis:  Acute pulmonary edema (J81.0): ACUTE PULMONARY EDEMA    HPI:  49yo F with ESRD (2/2 Membranous Nephropathy, HD on M/W/) and HTN presenting with acute SOB which woke her up from sleep. Until yesterday was in otherwise usual state of health. Overnight, patient was complaining of severe HA. No improvement with Tylenol. Of note, at the time of HA BP was noted to be 190s/100s. At the time, CT Head was ordered but patient refused to go until her pain subsided. She was given Dilaudid with resolution of her pain. Describes as fronto-temporal, bilateral, and severe. Pt states the pain characteristics differ from stabbing to squeezing to pounding. She denies any preceding aura, change in vision, nausea/vomiting.     Currently her HA has resolved  Patient reports chronic issue with HA for past several months after HD sessions  also some neck pain on the left chronic  ******    Past Medical History:  GERD (gastroesophageal reflux disease) (530.81)  Latex allergy (995.3)  Obesity, Class III, BMI 40-49.9 (morbid obesity) (278.01)  Endometriosis (617.9)  HTN (hypertension) (401.9)  Dyslipidemia (272.4)  Membranous Glomerulonephropathy (583.1)  Chronic Hepatitis B (070.32)  History of Nephrotic Syndrome (V13.03)      Past Surgical History:  Hx of tonsillectomy (V45.89): as a child  Hx of cholecystectomy (V45.79)  H/O  section x2 (V45.89): ,       Social History:  No toxic habits    Family History:  FAMILY HISTORY:  No pertinent family history in first degree relatives      Allergies:  latex (Unknown)  penicillin (Unknown)      ROS:  Constitutional: as per chart    Advanced care planning reviewed and noted in the chart.      Medications:  acetaminophen   Tablet .. 650 milliGRAM(s) Oral every 6 hours PRN  guaiFENesin   Syrup  (Sugar-Free) 100 milliGRAM(s) Oral every 6 hours PRN  heparin  Injectable 5000 Unit(s) SubCutaneous every 12 hours  HYDROmorphone  Injectable 0.5 milliGRAM(s) IV Push every 8 hours PRN  influenza   Vaccine 0.5 milliLiter(s) IntraMuscular once  labetalol 300 milliGRAM(s) Oral every 12 hours  magnesium oxide 400 milliGRAM(s) Oral daily      Labs:  CBC Full  -  ( 2019 09:30 )  WBC Count : 5.8 K/uL  Hemoglobin : 11.6 g/dL  Hematocrit : 35.1 %  Platelet Count - Automated : 277 K/uL  Mean Cell Volume : 92.2 fl  Mean Cell Hemoglobin : 30.6 pg  Mean Cell Hemoglobin Concentration : 33.2 gm/dL  Auto Neutrophil # : x  Auto Lymphocyte # : x  Auto Monocyte # : x  Auto Eosinophil # : x  Auto Basophil # : x  Auto Neutrophil % : x  Auto Lymphocyte % : x  Auto Monocyte % : x  Auto Eosinophil % : x  Auto Basophil % : x        137  |  99  |  56<H>  ----------------------------<  135<H>  5.4<H>   |  20<L>  |  8.12<H>    Ca    9.7      2019 09:28  Mg     2.3           CAPILLARY BLOOD GLUCOSE                  Vitals:  Vital Signs Last 24 Hrs  T(C): 36.6 (2019 01:15), Max: 36.9 (2019 11:33)  T(F): 97.8 (2019 01:15), Max: 98.5 (2019 11:33)  HR: 66 (2019 01:46) (64 - 68)  BP: 146/84 (2019 01:46) (130/79 - 176/95)  BP(mean): --  RR: 18 (2019 01:46) (17 - 18)  SpO2: 95% (2019 01:46) (95% - 98%)  NEUROLOGICAL EXAM:    Mental status: Awake, alert, and in no apparent distress. Oriented to person, place and time. Language function is normal. Recent memory, digit span and concentration were normal.     Cranial Nerves: Pupils were equal, round, reactive to light. Extraocular movements were intact. Visual field were full. Fundoscopic exam was deferred. Facial sensation was intact to light touch. There was no facial asymmetry. The palate was upgoing symmetrically and tongue was midline. Hearing acuity was intact to finger rub AU. Shoulder shrug was full bilaterally    Motor exam: Bulk and tone were normal. Strength was 5/5 in all four extremities. Fine finger movements were symmetric and normal. There was no pronator drift    Reflexes: 2+ in the bilateral upper extremities. 2+ in the bilateral lower extremities. Toes were downgoing bilaterally.     Sensation: Intact to light touch, temperature,  and proprioception.     Coordination: Finger-nose-finger without dysmetria.     Gait: Narrow base and steady.   pain on palpation of neck on the left    IMAGING:  < from: CT Head No Cont (19 @ 23:27) >  FINDINGS:  No acute hemorrhage or infarct is identified. No hydrocephalus, midline shift or extra-axial collections are present.  Incidental note is again made of a partially empty sella.  The orbits, paranasal sinuses and tympanomastoid cavities are not remarkable in appearance.    Impression: No acute hemorrhage hydrocephalus or mass effect.

## 2019-01-24 NOTE — DISCHARGE NOTE ADULT - CARE PROVIDER_API CALL
Neo Nunn), Internal Medicine; Nephrology  [address] [place] [address]  Phone: [phone]  Fax: [phone]    Mj Sampson), Electrodiagnostic Medicine; Neurology  [address] [place] [address] [address]  [place] [address]  Phone: [phone]  Fax: [phone]

## 2019-01-24 NOTE — DISCHARGE NOTE ADULT - PLAN OF CARE
to remain without fluid overload HD as scheduled. Weigh yourself daily.  If you gain 3lbs in 3 days, or 5lbs in a week call your Health Care Provider.  Do not eat or drink foods containing more than 2000mg of salt (sodium) in your diet every day.  Call your Health Care Provider if you have any swelling or increased swelling in your feet, ankles, and/or stomach.  Take all of your medication as directed.  If you become dizzy call your Health Care Provider. Follow up with your medical doctor to establish long term blood pressure treatment goals. Avoid taking (NSAIDs) - (ex: Ibuprofen, Advil, Celebrex, Naprosyn)  Avoid taking any nephrotoxic agents (can harm kidneys) - Intravenous contrast for diagnostic testing, combination cold medications.  Have all medications adjusted for your renal function by your Health Care Provider.  Blood pressure control is important.  Take all medication as prescribed. Follow up with neurology Continue drops to right ear twice a day for 1 week. Follow up with ENT and PMD  Monday 1/28/19 as scheduled. Weigh yourself daily.  If you gain 3lbs in 3 days, or 5lbs in a week call your Health Care Provider.  Do not eat or drink foods containing more than 2000mg of salt (sodium) in your diet every day.  Call your Health Care Provider if you have any swelling or increased swelling in your feet, ankles, and/or stomach.  Take all of your medication as directed.  If you become dizzy call your Health Care Provider. Continue renagel three times a day until seen by Dr. Nunn. Phos 5.1, 1/21/19 HD Tomorrow 1/25/19 as scheduled. Weigh yourself daily. Take 120mg daily of lasix.   If you gain 3lbs in 3 days, or 5lbs in a week call your Health Care Provider.  Do not eat or drink foods containing more than 2000mg of salt (sodium) in your diet every day.  Call your Health Care Provider if you have any swelling or increased swelling in your feet, ankles, and/or stomach.  Take all of your medication as directed.  If you become dizzy call your Health Care Provider.

## 2019-01-24 NOTE — DISCHARGE NOTE ADULT - MEDICATION SUMMARY - MEDICATIONS TO TAKE
I will START or STAY ON the medications listed below when I get home from the hospital:    To Whom It May Concern  -- Aranza Wang was admitted to [facility] from 1/21/19 to 1/24/19. She may return to work 1/28/19. For questions or concerns call   -- Indication: For Note    acetaminophen 325 mg oral tablet  -- 2 tab(s) by mouth every 6 hours, As needed, Mild Pain (1 - 3)  -- Indication: For pain as needed    labetalol 300 mg oral tablet  -- 1 tab(s) by mouth every 12 hours  -- Indication: For HTN (hypertension)    ofloxacin 0.3% ophthalmic solution  -- 2 drop(s) to each affected eye 2 times a day   -- Indication: For Otitis externa    sevelamer hydrochloride 800 mg oral tablet  -- 1 tab(s) by mouth 3 times a day (with meals)  -- Indication: For Hyperphosphatemia I will START or STAY ON the medications listed below when I get home from the hospital:    To Whom It May Concern  -- Aranza Wang was admitted to [facility] from 1/21/19 to 1/24/19. She may return to work 1/28/19. For questions or concerns call   -- Indication: For Note    acetaminophen 325 mg oral tablet  -- 2 tab(s) by mouth every 6 hours, As needed, Mild Pain (1 - 3)  -- Indication: For pain as needed    labetalol 300 mg oral tablet  -- 1 tab(s) by mouth every 12 hours  -- Indication: For HTN (hypertension)    furosemide 80 mg oral tablet  -- 1 tab(s) by mouth once a day   -- Avoid prolonged or excessive exposure to direct and/or artificial sunlight while taking this medication.  It is very important that you take or use this exactly as directed.  Do not skip doses or discontinue unless directed by your doctor.  It may be advisable to drink a full glass orange juice or eat a banana daily while taking this medication.    -- Indication: For Acute pulmonary edema    furosemide 40 mg oral tablet  -- 1 tab(s) by mouth once a day   -- Avoid prolonged or excessive exposure to direct and/or artificial sunlight while taking this medication.  It is very important that you take or use this exactly as directed.  Do not skip doses or discontinue unless directed by your doctor.  It may be advisable to drink a full glass orange juice or eat a banana daily while taking this medication.    -- Indication: For Acute pulmonary edema    ofloxacin 0.3% ophthalmic solution  -- 2 drop(s) to each affected eye 2 times a day   -- Indication: For Otitis externa    sevelamer hydrochloride 800 mg oral tablet  -- 1 tab(s) by mouth 3 times a day (with meals)  -- Indication: For Hyperphosphatemia

## 2019-01-24 NOTE — DISCHARGE NOTE ADULT - CARE PLAN
Principal Discharge DX:	Acute pulmonary edema  Goal:	to remain without fluid overload  Assessment and plan of treatment:	HD as scheduled. Weigh yourself daily.  If you gain 3lbs in 3 days, or 5lbs in a week call your Health Care Provider.  Do not eat or drink foods containing more than 2000mg of salt (sodium) in your diet every day.  Call your Health Care Provider if you have any swelling or increased swelling in your feet, ankles, and/or stomach.  Take all of your medication as directed.  If you become dizzy call your Health Care Provider.  Secondary Diagnosis:	HTN (hypertension)  Assessment and plan of treatment:	Follow up with your medical doctor to establish long term blood pressure treatment goals.  Secondary Diagnosis:	ESRD (end stage renal disease) on dialysis  Assessment and plan of treatment:	Avoid taking (NSAIDs) - (ex: Ibuprofen, Advil, Celebrex, Naprosyn)  Avoid taking any nephrotoxic agents (can harm kidneys) - Intravenous contrast for diagnostic testing, combination cold medications.  Have all medications adjusted for your renal function by your Health Care Provider.  Blood pressure control is important.  Take all medication as prescribed.  Secondary Diagnosis:	Headache  Assessment and plan of treatment:	Follow up with neurology  Secondary Diagnosis:	Otitis externa  Assessment and plan of treatment:	Continue drops to right ear twice a day for 1 week. Follow up with ENT and PMD Principal Discharge DX:	Acute pulmonary edema  Goal:	to remain without fluid overload  Assessment and plan of treatment:	HD Monday 1/28/19 as scheduled. Weigh yourself daily.  If you gain 3lbs in 3 days, or 5lbs in a week call your Health Care Provider.  Do not eat or drink foods containing more than 2000mg of salt (sodium) in your diet every day.  Call your Health Care Provider if you have any swelling or increased swelling in your feet, ankles, and/or stomach.  Take all of your medication as directed.  If you become dizzy call your Health Care Provider.  Secondary Diagnosis:	HTN (hypertension)  Assessment and plan of treatment:	Follow up with your medical doctor to establish long term blood pressure treatment goals.  Secondary Diagnosis:	ESRD (end stage renal disease) on dialysis  Assessment and plan of treatment:	Avoid taking (NSAIDs) - (ex: Ibuprofen, Advil, Celebrex, Naprosyn)  Avoid taking any nephrotoxic agents (can harm kidneys) - Intravenous contrast for diagnostic testing, combination cold medications.  Have all medications adjusted for your renal function by your Health Care Provider.  Blood pressure control is important.  Take all medication as prescribed.  Secondary Diagnosis:	Headache  Assessment and plan of treatment:	Follow up with neurology  Secondary Diagnosis:	Otitis externa  Assessment and plan of treatment:	Continue drops to right ear twice a day for 1 week. Follow up with ENT and PMD  Secondary Diagnosis:	Hyperphosphatemia  Assessment and plan of treatment:	Continue renagel three times a day until seen by Dr. Nunn. Phos 5.1, 1/21/19 Principal Discharge DX:	Acute pulmonary edema  Goal:	to remain without fluid overload  Assessment and plan of treatment:	HD Tomorrow 1/25/19 as scheduled. Weigh yourself daily. Take 120mg daily of lasix.   If you gain 3lbs in 3 days, or 5lbs in a week call your Health Care Provider.  Do not eat or drink foods containing more than 2000mg of salt (sodium) in your diet every day.  Call your Health Care Provider if you have any swelling or increased swelling in your feet, ankles, and/or stomach.  Take all of your medication as directed.  If you become dizzy call your Health Care Provider.  Secondary Diagnosis:	HTN (hypertension)  Assessment and plan of treatment:	Follow up with your medical doctor to establish long term blood pressure treatment goals.  Secondary Diagnosis:	ESRD (end stage renal disease) on dialysis  Assessment and plan of treatment:	Avoid taking (NSAIDs) - (ex: Ibuprofen, Advil, Celebrex, Naprosyn)  Avoid taking any nephrotoxic agents (can harm kidneys) - Intravenous contrast for diagnostic testing, combination cold medications.  Have all medications adjusted for your renal function by your Health Care Provider.  Blood pressure control is important.  Take all medication as prescribed.  Secondary Diagnosis:	Headache  Assessment and plan of treatment:	Follow up with neurology  Secondary Diagnosis:	Otitis externa  Assessment and plan of treatment:	Continue drops to right ear twice a day for 1 week. Follow up with ENT and PMD  Secondary Diagnosis:	Hyperphosphatemia  Assessment and plan of treatment:	Continue renagel three times a day until seen by Dr. Nunn. Phos 5.1, 1/21/19

## 2019-01-24 NOTE — PROGRESS NOTE ADULT - SUBJECTIVE AND OBJECTIVE BOX
Patient is a 48y old  Female who presents with a chief complaint of sob (24 Jan 2019 09:24)      SUBJECTIVE / OVERNIGHT EVENTS:  No chest pain. No shortness of breath. No complaints. No events overnight.     MEDICATIONS  (STANDING):  heparin  Injectable 5000 Unit(s) SubCutaneous every 12 hours  influenza   Vaccine 0.5 milliLiter(s) IntraMuscular once  labetalol 300 milliGRAM(s) Oral every 12 hours  ofloxacin 0.3% Solution 2 Drop(s) Right Ear two times a day    MEDICATIONS  (PRN):  acetaminophen   Tablet .. 650 milliGRAM(s) Oral every 6 hours PRN Mild Pain (1 - 3)  guaiFENesin   Syrup  (Sugar-Free) 100 milliGRAM(s) Oral every 6 hours PRN Cough  HYDROmorphone  Injectable 0.5 milliGRAM(s) IV Push every 8 hours PRN Severe Pain (7 - 10)      Vital Signs Last 24 Hrs  T(C): 36.9 (24 Jan 2019 12:32), Max: 36.9 (24 Jan 2019 12:32)  T(F): 98.4 (24 Jan 2019 12:32), Max: 98.4 (24 Jan 2019 12:32)  HR: 64 (24 Jan 2019 12:32) (64 - 68)  BP: 133/85 (24 Jan 2019 12:32) (123/75 - 176/95)  BP(mean): --  RR: 18 (24 Jan 2019 12:32) (17 - 19)  SpO2: 98% (24 Jan 2019 12:32) (95% - 98%)  CAPILLARY BLOOD GLUCOSE        I&O's Summary    23 Jan 2019 07:01  -  24 Jan 2019 07:00  --------------------------------------------------------  IN: 1400 mL / OUT: 2150 mL / NET: -750 mL    24 Jan 2019 07:01  -  24 Jan 2019 13:54  --------------------------------------------------------  IN: 240 mL / OUT: 0 mL / NET: 240 mL        PHYSICAL EXAM:  GENERAL: NAD, well-developed  HEAD:  Atraumatic, Normocephalic  EYES: EOMI, PERRLA, conjunctiva and sclera clear  NECK: Supple, No JVD  CHEST/LUNG: Clear to auscultation bilaterally; No wheeze  HEART: Regular rate and rhythm; No murmurs, rubs, or gallops  ABDOMEN: Soft, Nontender, Nondistended; Bowel sounds present  EXTREMITIES:  2+ Peripheral Pulses, No clubbing, cyanosis, or edema  PSYCH: AAOx3  NEUROLOGY: non-focal  SKIN: No rashes or lesions    LABS:                        11.6   5.0   )-----------( 248      ( 24 Jan 2019 10:18 )             33.6     01-24    138  |  99  |  39<H>  ----------------------------<  96  4.6   |  24  |  6.15<H>    Ca    9.6      24 Jan 2019 10:18  Mg     2.3     01-23    TPro  7.0  /  Alb  4.0  /  TBili  0.4  /  DBili  x   /  AST  15  /  ALT  24  /  AlkPhos  66  01-24              RADIOLOGY & ADDITIONAL TESTS:    Imaging Personally Reviewed:    Consultant(s) Notes Reviewed:      Care Discussed with Consultants/Other Providers:

## 2019-01-24 NOTE — CHART NOTE - NSCHARTNOTEFT_GEN_A_CORE
Medically cleared by Dr. Brooks and nephrology, Dr. crowley for discharge. Continue renegel as noted increased phos on 1/21/19 as per Renal. Follow up with for HD as scheduled and with PMD Medically cleared by Dr. Brooks and nephrology, Dr. crowley for discharge. Continue renegel as noted increased phos on 1/21/19 and start lasix 120mg daily as per Renal. Follow up with for HD as scheduled tomorrow 1/25/19.

## 2019-01-25 ENCOUNTER — APPOINTMENT (OUTPATIENT)
Dept: ENDOVASCULAR SURGERY | Facility: CLINIC | Age: 49
End: 2019-01-25

## 2019-02-04 NOTE — ED PROVIDER NOTE - PHYSICAL EXAMINATION
Gen: NAD, AOx3  Head: NCAT  HEENT: PERRL, oral mucosa moist, normal conjunctiva, neck supple  Lung: CTAB, no respiratory distress  CV: rrr, no murmur, Normal perfusion  Abd: soft, NTND  MSK: +1 b/l LE pitting edema, no visible deformities  Neuro: No focal neurologic deficits, 5/5 global strength, sensation itnact  Skin: No rash   Psych: normal affect CT abdomen/pelvis negative for any acute pathology  ? if related to bowel wall edema from hypervolemia secondary to lack of dialysis.

## 2019-02-05 ENCOUNTER — OTHER (OUTPATIENT)
Age: 49
End: 2019-02-05

## 2019-02-07 NOTE — ED PROVIDER NOTE - CHIEF COMPLAINT
The patient is a 48y Female complaining of shortness of breath.
Color consistent with ethnicity/race, warm, dry intact, resilient.

## 2019-02-15 ENCOUNTER — APPOINTMENT (OUTPATIENT)
Dept: ENDOVASCULAR SURGERY | Facility: CLINIC | Age: 49
End: 2019-02-15
Payer: COMMERCIAL

## 2019-02-15 VITALS — DIASTOLIC BLOOD PRESSURE: 108 MMHG | SYSTOLIC BLOOD PRESSURE: 177 MMHG

## 2019-02-15 VITALS
RESPIRATION RATE: 17 BRPM | DIASTOLIC BLOOD PRESSURE: 105 MMHG | BODY MASS INDEX: 16.76 KG/M2 | HEART RATE: 69 BPM | WEIGHT: 106.99 LBS | SYSTOLIC BLOOD PRESSURE: 179 MMHG | OXYGEN SATURATION: 96 % | TEMPERATURE: 98.3 F

## 2019-02-15 DIAGNOSIS — I10 ESSENTIAL (PRIMARY) HYPERTENSION: ICD-10-CM

## 2019-02-15 PROCEDURE — 93990 DOPPLER FLOW TESTING: CPT

## 2019-02-15 NOTE — HISTORY OF PRESENT ILLNESS
[] : right internal jugular tunneled catheter [FreeTextEntry1] : 10/17/2018 Dr. Cunningham [FreeTextEntry2] : 10/17/2018 Dr. Cunningham [FreeTextEntry4] : 2-13-19 [FreeTextEntry5] : last night 2-14-19 [FreeTextEntry6] : Dr. Perez

## 2019-02-15 NOTE — PAST MEDICAL HISTORY
[Increasing age ( >40 years old)] : Increasing age ( >40 years old) [No therapy indicated for cases scheduled for less than one hour] : No therapy indicated for cases scheduled for less than one hour. [FreeTextEntry1] : Malignant Hyperthermia Screening Tool and Risk of Bleeding Assessment\par \par Ms. ROSEMARY GUSTAFSON denies family history of unexpected death following Anesthesia or Exercise.\par Denies Family history of Malignant Hyperthermia, Muscle or Neuromuscular disorder and High Temperature following exercise.\par \par Ms. ROSEMARY GUSTAFSON denies history of Muscle Spasm, Dark or Chocolate - Colored urine and Unanticipated fever immediately following anesthesia or serious exercise. \par Ms. GUSTAFSON also denies bleeding tendencies/ Risks of Bleeding.\par

## 2019-02-27 ENCOUNTER — OTHER (OUTPATIENT)
Age: 49
End: 2019-02-27

## 2019-03-11 ENCOUNTER — APPOINTMENT (OUTPATIENT)
Dept: ENDOVASCULAR SURGERY | Facility: CLINIC | Age: 49
End: 2019-03-11
Payer: COMMERCIAL

## 2019-03-11 PROCEDURE — 36589 REMOVAL TUNNELED CV CATH: CPT

## 2019-03-20 ENCOUNTER — APPOINTMENT (OUTPATIENT)
Dept: HEPATOLOGY | Facility: CLINIC | Age: 49
End: 2019-03-20
Payer: COMMERCIAL

## 2019-03-20 VITALS
HEART RATE: 82 BPM | BODY MASS INDEX: 38.3 KG/M2 | RESPIRATION RATE: 16 BRPM | DIASTOLIC BLOOD PRESSURE: 123 MMHG | WEIGHT: 244 LBS | HEIGHT: 67 IN | SYSTOLIC BLOOD PRESSURE: 191 MMHG | TEMPERATURE: 98 F

## 2019-03-20 PROCEDURE — 99214 OFFICE O/P EST MOD 30 MIN: CPT

## 2019-03-20 RX ORDER — DOCUSATE SODIUM 100 MG/1
100 CAPSULE ORAL TWICE DAILY
Qty: 180 | Refills: 3 | Status: DISCONTINUED | COMMUNITY
Start: 2018-10-24 | End: 2019-03-20

## 2019-03-20 RX ORDER — POLYETHYLENE GLYCOL 3350 17 G/17G
17 POWDER, FOR SOLUTION ORAL DAILY
Qty: 30 | Refills: 5 | Status: DISCONTINUED | COMMUNITY
Start: 2018-10-24 | End: 2019-03-20

## 2019-03-20 RX ORDER — CLINDAMYCIN HYDROCHLORIDE 300 MG/1
300 CAPSULE ORAL
Qty: 2 | Refills: 0 | Status: DISCONTINUED | COMMUNITY
Start: 2019-02-27 | End: 2019-03-20

## 2019-03-20 RX ORDER — ENTECAVIR 0.5 MG/1
0.5 TABLET, FILM COATED ORAL
Qty: 32 | Refills: 3 | Status: DISCONTINUED | COMMUNITY
Start: 2017-10-24 | End: 2019-03-20

## 2019-03-21 LAB
ALBUMIN SERPL ELPH-MCNC: 4 G/DL
ALP BLD-CCNC: 38 U/L
ALT SERPL-CCNC: 12 U/L
ANION GAP SERPL CALC-SCNC: 15 MMOL/L
AST SERPL-CCNC: 13 U/L
BASOPHILS # BLD AUTO: 0.05 K/UL
BASOPHILS NFR BLD AUTO: 0.6 %
BILIRUB SERPL-MCNC: 0.4 MG/DL
BUN SERPL-MCNC: 53 MG/DL
CALCIUM SERPL-MCNC: 9.6 MG/DL
CHLORIDE SERPL-SCNC: 103 MMOL/L
CO2 SERPL-SCNC: 22 MMOL/L
CREAT SERPL-MCNC: 8.96 MG/DL
EOSINOPHIL # BLD AUTO: 0.76 K/UL
EOSINOPHIL NFR BLD AUTO: 8.5 %
HBV SURFACE AB SER QL: NONREACTIVE
HBV SURFACE AG SER QL: REACTIVE
HCT VFR BLD CALC: 38.7 %
HGB BLD-MCNC: 11.8 G/DL
IMM GRANULOCYTES NFR BLD AUTO: 0.2 %
LYMPHOCYTES # BLD AUTO: 1.98 K/UL
LYMPHOCYTES NFR BLD AUTO: 22.2 %
MAN DIFF?: NORMAL
MCHC RBC-ENTMCNC: 30 PG
MCHC RBC-ENTMCNC: 30.5 GM/DL
MCV RBC AUTO: 98.5 FL
MONOCYTES # BLD AUTO: 0.58 K/UL
MONOCYTES NFR BLD AUTO: 6.5 %
NEUTROPHILS # BLD AUTO: 5.54 K/UL
NEUTROPHILS NFR BLD AUTO: 62 %
PLATELET # BLD AUTO: 253 K/UL
POTASSIUM SERPL-SCNC: 4.3 MMOL/L
PROT SERPL-MCNC: 6.5 G/DL
RBC # BLD: 3.93 M/UL
RBC # FLD: 14.9 %
SODIUM SERPL-SCNC: 140 MMOL/L
WBC # FLD AUTO: 8.93 K/UL

## 2019-03-22 LAB
HBV DNA # SERPL NAA+PROBE: NORMAL IU/ML
HEPB DNA PCR LOG: 4.21 LOGIU/ML

## 2019-03-24 LAB
HBV E AB SER QL: POSITIVE
HBV E AG SER QL: NEGATIVE

## 2019-03-26 ENCOUNTER — EMERGENCY (EMERGENCY)
Facility: HOSPITAL | Age: 49
LOS: 1 days | Discharge: HOME | End: 2019-03-26
Attending: EMERGENCY MEDICINE
Payer: COMMERCIAL

## 2019-03-26 VITALS
RESPIRATION RATE: 17 BRPM | SYSTOLIC BLOOD PRESSURE: 168 MMHG | DIASTOLIC BLOOD PRESSURE: 91 MMHG | OXYGEN SATURATION: 97 % | HEART RATE: 61 BPM | TEMPERATURE: 98 F

## 2019-03-26 VITALS
RESPIRATION RATE: 19 BRPM | HEIGHT: 66 IN | SYSTOLIC BLOOD PRESSURE: 186 MMHG | TEMPERATURE: 98 F | HEART RATE: 64 BPM | DIASTOLIC BLOOD PRESSURE: 116 MMHG | OXYGEN SATURATION: 100 % | WEIGHT: 235.89 LBS

## 2019-03-26 LAB
ALBUMIN SERPL ELPH-MCNC: 3.9 G/DL — SIGNIFICANT CHANGE UP (ref 3.3–5)
ALP SERPL-CCNC: 41 U/L — SIGNIFICANT CHANGE UP (ref 40–120)
ALT FLD-CCNC: 16 U/L — SIGNIFICANT CHANGE UP (ref 10–45)
ANION GAP SERPL CALC-SCNC: 18 MMOL/L — HIGH (ref 5–17)
APTT BLD: 30.9 SEC — SIGNIFICANT CHANGE UP (ref 27.5–36.3)
AST SERPL-CCNC: 14 U/L — SIGNIFICANT CHANGE UP (ref 10–40)
BASOPHILS # BLD AUTO: 0 K/UL — SIGNIFICANT CHANGE UP (ref 0–0.2)
BASOPHILS NFR BLD AUTO: 0.6 % — SIGNIFICANT CHANGE UP (ref 0–2)
BILIRUB SERPL-MCNC: 0.4 MG/DL — SIGNIFICANT CHANGE UP (ref 0.2–1.2)
BUN SERPL-MCNC: 54 MG/DL — HIGH (ref 7–23)
CALCIUM SERPL-MCNC: 9.5 MG/DL — SIGNIFICANT CHANGE UP (ref 8.4–10.5)
CHLORIDE SERPL-SCNC: 98 MMOL/L — SIGNIFICANT CHANGE UP (ref 96–108)
CO2 SERPL-SCNC: 22 MMOL/L — SIGNIFICANT CHANGE UP (ref 22–31)
CREAT SERPL-MCNC: 8.43 MG/DL — HIGH (ref 0.5–1.3)
EOSINOPHIL # BLD AUTO: 0.5 K/UL — SIGNIFICANT CHANGE UP (ref 0–0.5)
EOSINOPHIL NFR BLD AUTO: 6.5 % — HIGH (ref 0–6)
GLUCOSE SERPL-MCNC: 104 MG/DL — HIGH (ref 70–99)
HCG SERPL-ACNC: <2 MIU/ML — SIGNIFICANT CHANGE UP
HCT VFR BLD CALC: 35.7 % — SIGNIFICANT CHANGE UP (ref 34.5–45)
HGB BLD-MCNC: 12 G/DL — SIGNIFICANT CHANGE UP (ref 11.5–15.5)
INR BLD: 0.92 RATIO — SIGNIFICANT CHANGE UP (ref 0.88–1.16)
LYMPHOCYTES # BLD AUTO: 1.1 K/UL — SIGNIFICANT CHANGE UP (ref 1–3.3)
LYMPHOCYTES # BLD AUTO: 14.7 % — SIGNIFICANT CHANGE UP (ref 13–44)
MCHC RBC-ENTMCNC: 31.2 PG — SIGNIFICANT CHANGE UP (ref 27–34)
MCHC RBC-ENTMCNC: 33.5 GM/DL — SIGNIFICANT CHANGE UP (ref 32–36)
MCV RBC AUTO: 93.2 FL — SIGNIFICANT CHANGE UP (ref 80–100)
MONOCYTES # BLD AUTO: 0.6 K/UL — SIGNIFICANT CHANGE UP (ref 0–0.9)
MONOCYTES NFR BLD AUTO: 8 % — SIGNIFICANT CHANGE UP (ref 2–14)
NEUTROPHILS # BLD AUTO: 5.3 K/UL — SIGNIFICANT CHANGE UP (ref 1.8–7.4)
NEUTROPHILS NFR BLD AUTO: 70.1 % — SIGNIFICANT CHANGE UP (ref 43–77)
PLATELET # BLD AUTO: 214 K/UL — SIGNIFICANT CHANGE UP (ref 150–400)
POTASSIUM SERPL-MCNC: 5 MMOL/L — SIGNIFICANT CHANGE UP (ref 3.5–5.3)
POTASSIUM SERPL-SCNC: 5 MMOL/L — SIGNIFICANT CHANGE UP (ref 3.5–5.3)
PROT SERPL-MCNC: 7 G/DL — SIGNIFICANT CHANGE UP (ref 6–8.3)
PROTHROM AB SERPL-ACNC: 10.6 SEC — SIGNIFICANT CHANGE UP (ref 10–12.9)
RBC # BLD: 3.83 M/UL — SIGNIFICANT CHANGE UP (ref 3.8–5.2)
RBC # FLD: 14 % — SIGNIFICANT CHANGE UP (ref 10.3–14.5)
SODIUM SERPL-SCNC: 138 MMOL/L — SIGNIFICANT CHANGE UP (ref 135–145)
WBC # BLD: 7.6 K/UL — SIGNIFICANT CHANGE UP (ref 3.8–10.5)
WBC # FLD AUTO: 7.6 K/UL — SIGNIFICANT CHANGE UP (ref 3.8–10.5)

## 2019-03-26 PROCEDURE — 96375 TX/PRO/DX INJ NEW DRUG ADDON: CPT

## 2019-03-26 PROCEDURE — 70450 CT HEAD/BRAIN W/O DYE: CPT | Mod: 26

## 2019-03-26 PROCEDURE — 85027 COMPLETE CBC AUTOMATED: CPT

## 2019-03-26 PROCEDURE — 99285 EMERGENCY DEPT VISIT HI MDM: CPT

## 2019-03-26 PROCEDURE — 80053 COMPREHEN METABOLIC PANEL: CPT

## 2019-03-26 PROCEDURE — 70450 CT HEAD/BRAIN W/O DYE: CPT

## 2019-03-26 PROCEDURE — 85730 THROMBOPLASTIN TIME PARTIAL: CPT

## 2019-03-26 PROCEDURE — 85610 PROTHROMBIN TIME: CPT

## 2019-03-26 PROCEDURE — 96374 THER/PROPH/DIAG INJ IV PUSH: CPT

## 2019-03-26 PROCEDURE — 71045 X-RAY EXAM CHEST 1 VIEW: CPT

## 2019-03-26 PROCEDURE — 99284 EMERGENCY DEPT VISIT MOD MDM: CPT | Mod: 25

## 2019-03-26 PROCEDURE — 84702 CHORIONIC GONADOTROPIN TEST: CPT

## 2019-03-26 PROCEDURE — 71045 X-RAY EXAM CHEST 1 VIEW: CPT | Mod: 26

## 2019-03-26 RX ORDER — ONDANSETRON 8 MG/1
4 TABLET, FILM COATED ORAL ONCE
Qty: 0 | Refills: 0 | Status: COMPLETED | OUTPATIENT
Start: 2019-03-26 | End: 2019-03-26

## 2019-03-26 RX ORDER — MORPHINE SULFATE 50 MG/1
4 CAPSULE, EXTENDED RELEASE ORAL ONCE
Qty: 0 | Refills: 0 | Status: DISCONTINUED | OUTPATIENT
Start: 2019-03-26 | End: 2019-03-26

## 2019-03-26 RX ORDER — METOCLOPRAMIDE HCL 10 MG
10 TABLET ORAL ONCE
Qty: 0 | Refills: 0 | Status: COMPLETED | OUTPATIENT
Start: 2019-03-26 | End: 2019-03-26

## 2019-03-26 RX ORDER — DIPHENHYDRAMINE HCL 50 MG
25 CAPSULE ORAL ONCE
Qty: 0 | Refills: 0 | Status: COMPLETED | OUTPATIENT
Start: 2019-03-26 | End: 2019-03-26

## 2019-03-26 RX ORDER — TRAMADOL HYDROCHLORIDE 50 MG/1
1 TABLET ORAL
Qty: 12 | Refills: 0
Start: 2019-03-26 | End: 2019-03-29

## 2019-03-26 RX ADMIN — ONDANSETRON 4 MILLIGRAM(S): 8 TABLET, FILM COATED ORAL at 15:50

## 2019-03-26 RX ADMIN — MORPHINE SULFATE 4 MILLIGRAM(S): 50 CAPSULE, EXTENDED RELEASE ORAL at 17:46

## 2019-03-26 RX ADMIN — Medication 25 MILLIGRAM(S): at 17:45

## 2019-03-26 RX ADMIN — Medication 10 MILLIGRAM(S): at 17:46

## 2019-03-26 RX ADMIN — MORPHINE SULFATE 4 MILLIGRAM(S): 50 CAPSULE, EXTENDED RELEASE ORAL at 20:00

## 2019-03-26 NOTE — ED PROVIDER NOTE - PROGRESS NOTE DETAILS
Spoke to pt about probable need to perform LP if CT head is negative. Explained risks associated with missed SAH. Pt adamantly refusing LP. Received sign out from day team. Patient pending CT head. As per day team, had spoken with patient extensively re: need for LP if CT head is negative (and patient was explained risk/benefits) and patient refused LP and acknowledged risks of refusing LP.  Allan Villegas MD, PGY2 Emergency Medicine CT head no acute pathology  Patient feels improved. Still refusing LP, as above.  Will d/c home with strict return precautions and instructions to f/u with primary medical doctor  Allan Villegas MD, PGY2 Emergency Medicine

## 2019-03-26 NOTE — ED PROVIDER NOTE - CLINICAL SUMMARY MEDICAL DECISION MAKING FREE TEXT BOX
47 y/o Norwegian speaking female with history of CKD on HD since last year, hep B, p/w HA since last night not relieved by 3 Tylenol + nausea and vomiting. No history of migraine, fever, chills, or stiff neck. Non focal exam. Will obtain blood work and CT head. Pain medication and possible LP. -ZR

## 2019-03-26 NOTE — ED PROVIDER NOTE - NS ED ROS FT
GENERAL: No fever or chills  EYES: no change in vision  HEENT: no trouble swallowing or speaking  CARDIAC: no chest pain  PULMONARY: no cough or SOB  GI: +N and V. no abdominal pain, no diarrhea or constipation  : No changes in urination  SKIN: no rashes  NEURO: +HA. no neuro sxs  MSK: No joint pain     ~Norberto Couch PGY1

## 2019-03-26 NOTE — ED PROVIDER NOTE - OBJECTIVE STATEMENT
· HPI Objective Statement: 47 y/o F with PMHx of HTN, ESRD on HD MWF, presents c/o HA since last night after dialysis. BRICE moves to different locations primarily frontal and occipital, 8/10, sharp, burning and throbbing, a/w nausea and vomiting. Pt also notes cold sores on her throat. +tingling across her forehead, +dizzy, +SOB, and diffuse total body weakness. Pt denies change in vision, nuchal rigidity, numbness, focal weakness, difficulty speaking/swallowing/walking, fever, syncope, CP. Pt took 975mg tylenol for pain. Noted HTN at home of 225/118 and her usual daily Labetalol without change in HA or significant change in BP. Usual /90. Allergic to PCN and Latex. Former smoker. No personal or Fhx of migraine/AVM/stroke/aneurysm. · HPI Objective Statement: 47 y/o Ivorian speaking female with PMHx of HTN, ESRD on HD MWF, glomerular nephritis diagnosed 8 years ago on dialysis since October 2018, p/w HA since last night, last dialysis yesterday. BRICE moves to different locations primarily frontal and occipital, 8/10, sharp, burning and throbbing, a/w nausea and vomiting. Pt also notes cold sores on her throat. +tingling across her forehead, +dizzy, +SOB, and diffuse total body weakness. Pt denies change in vision, nuchal rigidity, numbness, focal weakness, difficulty speaking/swallowing/walking, fever, syncope, CP. Pt took 975mg tylenol for pain. Noted HTN at home of 225/118 and her usual daily Labetalol without change in HA or significant change in BP. Usual /90. Allergic to PCN and Latex. Former smoker. No personal or Fhx of migraine/AVM/stroke/aneurysm.     PMD Nephrologist: Dr. Angulo · HPI Objective Statement: 49 y/o Mosotho speaking female with PMHx of HTN, ESRD on HD MWF, glomerular nephritis diagnosed 8 years ago on dialysis since October 2018, p/w HA since last night, last dialysis yesterday. BRICE moves to different locations primarily frontal and occipital, 8/10, sharp, burning and throbbing, a/w nausea and vomiting. Pt also notes cold sores on her throat. +tingling across her forehead, +dizzy, +SOB, and diffuse total body weakness. Pt denies change in vision, nuchal rigidity, numbness, focal weakness, difficulty speaking/swallowing/walking, fever, syncope, CP. Pt took 975mg tylenol for pain. Noted HTN at home of 225/118 and her usual daily Labetalol without change in HA or significant change in BP. Usual /90. Allergic to PCN and Latex. Former smoker. No personal or Fhx of migraine/AVM/stroke/aneurysm.     PMD Nephrologist: Dr. Angulo

## 2019-03-26 NOTE — ED PROVIDER NOTE - PHYSICAL EXAMINATION
Gen: pale, morbidly obese, AAOx3, non-toxic  Head: NCAT  HEENT: EOMI, oral mucosa moist, normal conjunctiva  Lung: CTAB, no respiratory distress, no wheezes/rhonchi/rales B/L, speaking in full sentences  CV: RRR, no murmurs, rubs or gallops  Abd: soft, NTND, no guarding, no CVA tenderness  MSK: no visible deformities  Neuro: No focal sensory or motor deficits, normal CN exam   Skin: Warm, well perfused, no rash  Psych: normal affect.     ~Norberto Couch PGY1

## 2019-03-26 NOTE — ED ADULT NURSE NOTE - OBJECTIVE STATEMENT
48 year old female presents to the ED ambulatory through waiting room complaining of headache and hypertension. PMH of Hepatitis B,   Dyslipidemia, Endometriosis, GERD, Nephrotic Syndrome, HTN (hypertension), latex allergy, Membranous Glomerulonephropathy, Obesity, Class III, BMI 40-49.9 (morbid obesity). 20g peripheral IV placed in R ac by RN in triage (pt. was qdoc). Patient is awake, alert, a&ox3, following commands, strong equal strength bilaterally x 4, sensory in tact, ambulating independently with steady gait noted.

## 2019-03-26 NOTE — ED ADULT NURSE NOTE - NSIMPLEMENTINTERV_GEN_ALL_ED
Implemented All Universal Safety Interventions:  [place] to call system. Call bell, personal items and telephone within reach. Instruct patient to call for assistance. Room bathroom lighting operational. Non-slip footwear when patient is off stretcher. Physically safe environment: no spills, clutter or unnecessary equipment. Stretcher in lowest position, wheels locked, appropriate side rails in place.

## 2019-03-26 NOTE — ED PROVIDER NOTE - NSFOLLOWUPINSTRUCTIONS_ED_ALL_ED_FT
Please follow up with primary medical doctor this week for further care    Take Tylenol up to 650 mg every 6 hours as needed for pain.  Take motrin 600mg every 6 hours as needed for pain    Take TRAMADOL 50mg as needed for SEVERE pain, up to three times per day.   Do not drive or operate heavy machinery while taking as this medications can cause drowsiness.    Return to hospital for any new or concerning symptoms, including but not limited to: fevers, chills, nausea, vomiting, worsening headache, dizziness, lightheadedness, chest pain, shortness of breath, difficulty breathing, abdominal pain, weakness, or any other new or concerning symptoms.

## 2019-03-26 NOTE — ED STATDOCS - OBJECTIVE STATEMENT
47 y/o F with PMHx of HTN, ESRD on HD MWF, presents c/o HA since last night after dialysis. BRICE moves to different locations primarily frontal and occipital, 8/10, sharp, burning and throbbing, a/w nausea and vomiting. Pt also notes cold sores on her throat. +tingling across her forehead, +dizzy, and diffuse total body weakness. Pt denies change in vision, nuchal rigidity, numbness, focal weakness, difficulty speaking/swallowing/walking, fever, syncope. Pt took 975mg tylenol for pain. Noted HTN at home of 225/118 and her usual daily labetolol without change in HA or significant change in BP. Usual /90. Allergic to PCN and Latex. Former smoker. 47 y/o F with PMHx of HTN, ESRD on HD MWF, presents c/o HA since last night after dialysis. BRICE moves to different locations primarily frontal and occipital, 8/10, sharp, burning and throbbing, a/w nausea and vomiting. Pt also notes cold sores on her throat. +tingling across her forehead, +dizzy, and diffuse total body weakness. Pt denies change in vision, nuchal rigidity, numbness, focal weakness, difficulty speaking/swallowing/walking, fever, syncope. Pt took 975mg tylenol for pain. Noted HTN at home of 225/118 and her usual daily Labetalol without change in HA or significant change in BP. Usual /90. Allergic to PCN and Latex. Former smoker. No personal or Fhx of migraine/AVM/stroke/aneurysm. 47 y/o F with PMHx of HTN, ESRD on HD MWF, presents c/o HA since last night after dialysis. BRICE moves to different locations primarily frontal and occipital, 8/10, sharp, burning and throbbing, a/w nausea and vomiting. Pt also notes cold sores on her throat. +tingling across her forehead, +dizzy, +SOB, and diffuse total body weakness. Pt denies change in vision, nuchal rigidity, numbness, focal weakness, difficulty speaking/swallowing/walking, fever, syncope, CP. Pt took 975mg tylenol for pain. Noted HTN at home of 225/118 and her usual daily Labetalol without change in HA or significant change in BP. Usual /90. Allergic to PCN and Latex. Former smoker. No personal or Fhx of migraine/AVM/stroke/aneurysm.

## 2019-04-08 ENCOUNTER — OTHER (OUTPATIENT)
Age: 49
End: 2019-04-08

## 2019-04-16 DIAGNOSIS — E83.39 OTHER DISORDERS OF PHOSPHORUS METABOLISM: ICD-10-CM

## 2019-04-19 ENCOUNTER — OUTPATIENT (OUTPATIENT)
Dept: OUTPATIENT SERVICES | Facility: HOSPITAL | Age: 49
LOS: 1 days | End: 2019-04-19
Payer: COMMERCIAL

## 2019-04-19 ENCOUNTER — APPOINTMENT (OUTPATIENT)
Dept: HEPATOLOGY | Facility: HOSPITAL | Age: 49
End: 2019-04-19

## 2019-04-19 DIAGNOSIS — B18.1 CHRONIC VIRAL HEPATITIS B WITHOUT DELTA-AGENT: ICD-10-CM

## 2019-04-19 PROCEDURE — 45378 DIAGNOSTIC COLONOSCOPY: CPT

## 2019-04-19 PROCEDURE — 45378 DIAGNOSTIC COLONOSCOPY: CPT | Mod: GC

## 2019-04-22 ENCOUNTER — MEDICATION RENEWAL (OUTPATIENT)
Age: 49
End: 2019-04-22

## 2019-04-22 ENCOUNTER — FORM ENCOUNTER (OUTPATIENT)
Age: 49
End: 2019-04-22

## 2019-04-23 ENCOUNTER — OUTPATIENT (OUTPATIENT)
Dept: OUTPATIENT SERVICES | Facility: HOSPITAL | Age: 49
LOS: 1 days | End: 2019-04-23
Payer: COMMERCIAL

## 2019-04-23 ENCOUNTER — APPOINTMENT (OUTPATIENT)
Dept: VASCULAR SURGERY | Facility: CLINIC | Age: 49
End: 2019-04-23
Payer: COMMERCIAL

## 2019-04-23 ENCOUNTER — APPOINTMENT (OUTPATIENT)
Dept: MAMMOGRAPHY | Facility: IMAGING CENTER | Age: 49
End: 2019-04-23
Payer: COMMERCIAL

## 2019-04-23 ENCOUNTER — APPOINTMENT (OUTPATIENT)
Dept: ULTRASOUND IMAGING | Facility: IMAGING CENTER | Age: 49
End: 2019-04-23
Payer: COMMERCIAL

## 2019-04-23 VITALS
BODY MASS INDEX: 36.73 KG/M2 | HEIGHT: 67 IN | TEMPERATURE: 98 F | SYSTOLIC BLOOD PRESSURE: 179 MMHG | DIASTOLIC BLOOD PRESSURE: 100 MMHG | WEIGHT: 234 LBS | HEART RATE: 88 BPM

## 2019-04-23 DIAGNOSIS — B18.1 CHRONIC VIRAL HEPATITIS B WITHOUT DELTA-AGENT: ICD-10-CM

## 2019-04-23 PROCEDURE — 77067 SCR MAMMO BI INCL CAD: CPT

## 2019-04-23 PROCEDURE — 76700 US EXAM ABDOM COMPLETE: CPT

## 2019-04-23 PROCEDURE — 77063 BREAST TOMOSYNTHESIS BI: CPT

## 2019-04-23 PROCEDURE — 77063 BREAST TOMOSYNTHESIS BI: CPT | Mod: 26

## 2019-04-23 PROCEDURE — 99212 OFFICE O/P EST SF 10 MIN: CPT

## 2019-04-23 PROCEDURE — 76700 US EXAM ABDOM COMPLETE: CPT | Mod: 26

## 2019-04-23 PROCEDURE — 77067 SCR MAMMO BI INCL CAD: CPT | Mod: 26

## 2019-04-23 PROCEDURE — 93990 DOPPLER FLOW TESTING: CPT

## 2019-04-23 NOTE — PHYSICAL EXAM
[Thrill] : thrill [Pulsatile Thrill] : no pulsatile thrill [Aneurysm] : no aneurysm [Bleeding] : no bleeding [Hand well perfused] : hand well perfused [Ulcer] : no ulcer [Gangrene] : no gangrene [2+] : left 2+ [Normal] : coordination grossly intact, no focal deficits [de-identified] : intact

## 2019-04-23 NOTE — DISCUSSION/SUMMARY
[FreeTextEntry1] : 47 yo female with history of esrd on hd via left upper extremity avf presents for follow up.\par duplex shows 2 areas of 50-75% stenosis with flow rate of 2954 and good thrill on exam \par pt to follow up closely pt to follow up in 6 weeks with repeat duplex if any further difficulty with hd patient advised to follow up sooner

## 2019-04-23 NOTE — HISTORY OF PRESENT ILLNESS
[FreeTextEntry1] : 49 yo female with history of esrd on hd via left upper extremity avf presents for follow up.  pt reports occasional difficulty with cannulation at the proximal upper extremity and clotting.  pt states that there are no issues if they access closer to the ac \par pt denies any bleeding after hd  [] : left brachiocephalic fistula

## 2019-04-25 ENCOUNTER — RX RENEWAL (OUTPATIENT)
Age: 49
End: 2019-04-25

## 2019-05-07 ENCOUNTER — OTHER (OUTPATIENT)
Age: 49
End: 2019-05-07

## 2019-05-08 NOTE — ED ADULT NURSE NOTE - NS PRO PASSIVE SMOKE EXP
No Purse String (Intermediate) Text: Given the location of the defect and the characteristics of the surrounding skin a purse string intermediate closure was deemed most appropriate.  Undermining was performed circumfirentially around the surgical defect.  A purse string suture was then placed and tightened.

## 2019-05-22 ENCOUNTER — LABORATORY RESULT (OUTPATIENT)
Age: 49
End: 2019-05-22

## 2019-05-23 NOTE — REASON FOR VISIT
[Other ___] : a [unfilled] visit for [Difficult Cannulation] : difficult cannulation [Prolonged Bleeding] : prolonged bleeding

## 2019-05-24 ENCOUNTER — APPOINTMENT (OUTPATIENT)
Dept: ENDOVASCULAR SURGERY | Facility: CLINIC | Age: 49
End: 2019-05-24
Payer: COMMERCIAL

## 2019-05-24 VITALS
WEIGHT: 227.08 LBS | HEART RATE: 69 BPM | TEMPERATURE: 98.3 F | HEIGHT: 67 IN | SYSTOLIC BLOOD PRESSURE: 175 MMHG | BODY MASS INDEX: 35.64 KG/M2 | OXYGEN SATURATION: 100 % | DIASTOLIC BLOOD PRESSURE: 99 MMHG | RESPIRATION RATE: 18 BRPM

## 2019-05-24 PROCEDURE — 36902Z: CUSTOM

## 2019-05-24 NOTE — HISTORY OF PRESENT ILLNESS
[] : left brachiocephalic fistula [FreeTextEntry1] : 10/17/18/ Dr Cunningham [FreeTextEntry5] : yesterday [FreeTextEntry4] : yesterday [FreeTextEntry6] : Dr Perez

## 2019-06-13 ENCOUNTER — FORM ENCOUNTER (OUTPATIENT)
Age: 49
End: 2019-06-13

## 2019-06-14 ENCOUNTER — APPOINTMENT (OUTPATIENT)
Dept: HEPATOLOGY | Facility: CLINIC | Age: 49
End: 2019-06-14
Payer: COMMERCIAL

## 2019-06-14 ENCOUNTER — APPOINTMENT (OUTPATIENT)
Dept: ENDOVASCULAR SURGERY | Facility: CLINIC | Age: 49
End: 2019-06-14
Payer: COMMERCIAL

## 2019-06-14 ENCOUNTER — OUTPATIENT (OUTPATIENT)
Dept: OUTPATIENT SERVICES | Facility: HOSPITAL | Age: 49
LOS: 1 days | End: 2019-06-14
Payer: COMMERCIAL

## 2019-06-14 ENCOUNTER — APPOINTMENT (OUTPATIENT)
Dept: ULTRASOUND IMAGING | Facility: IMAGING CENTER | Age: 49
End: 2019-06-14
Payer: COMMERCIAL

## 2019-06-14 VITALS
HEART RATE: 62 BPM | RESPIRATION RATE: 16 BRPM | DIASTOLIC BLOOD PRESSURE: 93 MMHG | OXYGEN SATURATION: 95 % | BODY MASS INDEX: 35.63 KG/M2 | HEIGHT: 67 IN | WEIGHT: 227 LBS | TEMPERATURE: 98.6 F | SYSTOLIC BLOOD PRESSURE: 173 MMHG

## 2019-06-14 DIAGNOSIS — E04.1 NONTOXIC SINGLE THYROID NODULE: ICD-10-CM

## 2019-06-14 PROCEDURE — 76536 US EXAM OF HEAD AND NECK: CPT

## 2019-06-14 PROCEDURE — 36903Z: CUSTOM

## 2019-06-14 PROCEDURE — 91200 LIVER ELASTOGRAPHY: CPT

## 2019-06-14 PROCEDURE — 76536 US EXAM OF HEAD AND NECK: CPT | Mod: 26

## 2019-06-18 ENCOUNTER — APPOINTMENT (OUTPATIENT)
Dept: VASCULAR SURGERY | Facility: CLINIC | Age: 49
End: 2019-06-18

## 2019-06-18 NOTE — HISTORY OF PRESENT ILLNESS
[FreeTextEntry1] : 10/17/18/ Dr Cunningham [FreeTextEntry4] : 6/12/2019 [FreeTextEntry6] : Dr Perez [FreeTextEntry5] : yesterday 8pm

## 2019-06-18 NOTE — PAST MEDICAL HISTORY
[FreeTextEntry1] : Malignant Hyperthermia Screening Tool and Risk of Bleeding Assessment \par \par Ms. ROSEMARY GUSTAFSON denies family of unexpected death following Anesthesia or Exercise.\par Denies Family history of Malignant Hyperthermia, Muscle or Neuromuscular disorder and High Temperature  following exercise.\par \par Ms. ROSEMARY GUSTAFSON denies history of Muscle Spasm, Dark or Chocolate- Colored and Unanticipated fever immediately following anaesthesia or serious exercise.\par Ms. ROSEMARY GUSTAFSON also denies bleeding tendencies/Risks of Bleeding.\par

## 2019-06-24 ENCOUNTER — APPOINTMENT (OUTPATIENT)
Age: 49
End: 2019-06-24
Payer: COMMERCIAL

## 2019-06-24 ENCOUNTER — APPOINTMENT (OUTPATIENT)
Dept: HEPATOLOGY | Facility: HOSPITAL | Age: 49
End: 2019-06-24

## 2019-06-24 VITALS
BODY MASS INDEX: 37.51 KG/M2 | RESPIRATION RATE: 16 BRPM | TEMPERATURE: 98.1 F | HEIGHT: 67 IN | DIASTOLIC BLOOD PRESSURE: 108 MMHG | SYSTOLIC BLOOD PRESSURE: 180 MMHG | HEART RATE: 71 BPM | WEIGHT: 239 LBS

## 2019-06-24 DIAGNOSIS — K57.30 DIVERTICULOSIS OF LARGE INTESTINE W/OUT PERFORATION OR ABSCESS W/OUT BLEEDING: ICD-10-CM

## 2019-06-24 PROCEDURE — 99214 OFFICE O/P EST MOD 30 MIN: CPT

## 2019-06-25 LAB
HBV SURFACE AB SER QL: NONREACTIVE
HBV SURFACE AG SER QL: REACTIVE

## 2019-06-26 LAB
HBV DNA # SERPL NAA+PROBE: NOT DETECTED
HEPB DNA PCR LOG: NOT DETECTED LOGIU/ML

## 2019-07-01 ENCOUNTER — OUTPATIENT (OUTPATIENT)
Dept: OUTPATIENT SERVICES | Facility: HOSPITAL | Age: 49
LOS: 1 days | End: 2019-07-01
Payer: COMMERCIAL

## 2019-07-01 ENCOUNTER — APPOINTMENT (OUTPATIENT)
Dept: RADIOLOGY | Facility: CLINIC | Age: 49
End: 2019-07-01
Payer: COMMERCIAL

## 2019-07-01 DIAGNOSIS — Z00.8 ENCOUNTER FOR OTHER GENERAL EXAMINATION: ICD-10-CM

## 2019-07-01 PROCEDURE — 71046 X-RAY EXAM CHEST 2 VIEWS: CPT | Mod: 26

## 2019-07-01 PROCEDURE — 71046 X-RAY EXAM CHEST 2 VIEWS: CPT

## 2019-07-04 LAB
ESTRADIOL SERPL HS-MCNC: NORMAL
ESTRIOL SERPL-MCNC: NORMAL
ESTRONE SERPL-MCNC: NORMAL
HEPATITIS B GENOTYPE & DRUG RESISTANCE: NOT DETECTED

## 2019-07-08 ENCOUNTER — RX CHANGE (OUTPATIENT)
Age: 49
End: 2019-07-08

## 2019-07-22 NOTE — PROCEDURE
[Resume diet] : resume diet [FreeTextEntry1] : left arm fistula/fistulogram/angioplasty  [Site check for bleeding/hematoma/thrill/bruit] : Site check for bleeding/hematoma/thrill/bruit [Vital signs on admission the q 15 mins x2] : Vital signs on admission the q 15 mins x2

## 2019-07-22 NOTE — REASON FOR VISIT
[Other ___] : a [unfilled] visit for [Difficult Cannulation] : difficult cannulation [FreeTextEntry2] : pain in access

## 2019-07-22 NOTE — PAST MEDICAL HISTORY
[Increasing age ( >40 years old)] : Increasing age ( >40 years old) [No therapy indicated for cases scheduled for less than one hour] : No therapy indicated for cases scheduled for less than one hour. [FreeTextEntry1] : Malignant Hyperthermia Screening Tool and Risk of Bleeding Assessment \par \par Ms. ROSEMARY GUSTAFSON denies family of unexpected death following Anesthesia or Exercise.\par Denies Family history of Malignant Hyperthermia, Muscle or Neuromuscular disorder and High Temperature  following exercise.\par \par Ms. ROSEMARY GUSTAFSON denies history of Muscle Spasm, Dark or Chocolate- Colored and Unanticipated fever immediately following anaesthesia or serious exercise.\par Ms. ROSEMARY GUSTAFSON also denies bleeding tendencies/Risks of Bleeding.\par

## 2019-07-22 NOTE — HISTORY OF PRESENT ILLNESS
[] : left brachiocephalic fistula [FreeTextEntry1] : 10/17/18/ Dr Cunningham [FreeTextEntry4] : yesterday  [FreeTextEntry5] : yesterday 8pm [FreeTextEntry6] : Dr Perez

## 2019-07-23 ENCOUNTER — APPOINTMENT (OUTPATIENT)
Dept: ENDOVASCULAR SURGERY | Facility: CLINIC | Age: 49
End: 2019-07-23
Payer: COMMERCIAL

## 2019-07-23 ENCOUNTER — OUTPATIENT (OUTPATIENT)
Dept: OUTPATIENT SERVICES | Facility: HOSPITAL | Age: 49
LOS: 1 days | End: 2019-07-23
Payer: COMMERCIAL

## 2019-07-23 DIAGNOSIS — N18.6 END STAGE RENAL DISEASE: ICD-10-CM

## 2019-07-23 DIAGNOSIS — Z01.818 ENCOUNTER FOR OTHER PREPROCEDURAL EXAMINATION: ICD-10-CM

## 2019-07-23 PROCEDURE — 93010 ELECTROCARDIOGRAM REPORT: CPT

## 2019-07-23 PROCEDURE — 93005 ELECTROCARDIOGRAM TRACING: CPT

## 2019-07-23 PROCEDURE — 93990 DOPPLER FLOW TESTING: CPT

## 2019-08-12 ENCOUNTER — RX RENEWAL (OUTPATIENT)
Age: 49
End: 2019-08-12

## 2019-08-13 ENCOUNTER — RX RENEWAL (OUTPATIENT)
Age: 49
End: 2019-08-13

## 2019-08-19 ENCOUNTER — INPATIENT (INPATIENT)
Facility: HOSPITAL | Age: 49
LOS: 0 days | Discharge: HOME | DRG: 304 | End: 2019-08-19
Attending: HOSPITALIST | Admitting: HOSPITALIST
Payer: COMMERCIAL

## 2019-08-19 ENCOUNTER — TRANSCRIPTION ENCOUNTER (OUTPATIENT)
Age: 49
End: 2019-08-19

## 2019-08-19 VITALS
OXYGEN SATURATION: 98 % | DIASTOLIC BLOOD PRESSURE: 106 MMHG | SYSTOLIC BLOOD PRESSURE: 172 MMHG | HEART RATE: 77 BPM | RESPIRATION RATE: 18 BRPM

## 2019-08-19 VITALS
HEIGHT: 67 IN | HEART RATE: 72 BPM | WEIGHT: 240.08 LBS | RESPIRATION RATE: 22 BRPM | OXYGEN SATURATION: 98 % | DIASTOLIC BLOOD PRESSURE: 109 MMHG | SYSTOLIC BLOOD PRESSURE: 186 MMHG | TEMPERATURE: 98 F

## 2019-08-19 DIAGNOSIS — N18.6 END STAGE RENAL DISEASE: ICD-10-CM

## 2019-08-19 DIAGNOSIS — I10 ESSENTIAL (PRIMARY) HYPERTENSION: ICD-10-CM

## 2019-08-19 DIAGNOSIS — R09.89 OTHER SPECIFIED SYMPTOMS AND SIGNS INVOLVING THE CIRCULATORY AND RESPIRATORY SYSTEMS: ICD-10-CM

## 2019-08-19 DIAGNOSIS — E87.5 HYPERKALEMIA: ICD-10-CM

## 2019-08-19 DIAGNOSIS — I16.1 HYPERTENSIVE EMERGENCY: ICD-10-CM

## 2019-08-19 DIAGNOSIS — Z29.9 ENCOUNTER FOR PROPHYLACTIC MEASURES, UNSPECIFIED: ICD-10-CM

## 2019-08-19 DIAGNOSIS — E87.70 FLUID OVERLOAD, UNSPECIFIED: ICD-10-CM

## 2019-08-19 DIAGNOSIS — Z79.899 OTHER LONG TERM (CURRENT) DRUG THERAPY: ICD-10-CM

## 2019-08-19 DIAGNOSIS — B19.10 UNSPECIFIED VIRAL HEPATITIS B WITHOUT HEPATIC COMA: ICD-10-CM

## 2019-08-19 LAB
ALBUMIN SERPL ELPH-MCNC: 4.1 G/DL — SIGNIFICANT CHANGE UP (ref 3.3–5)
ALBUMIN SERPL ELPH-MCNC: 4.2 G/DL — SIGNIFICANT CHANGE UP (ref 3.3–5)
ALP SERPL-CCNC: 40 U/L — SIGNIFICANT CHANGE UP (ref 40–120)
ALP SERPL-CCNC: 42 U/L — SIGNIFICANT CHANGE UP (ref 40–120)
ALT FLD-CCNC: 19 U/L — SIGNIFICANT CHANGE UP (ref 10–45)
ALT FLD-CCNC: 21 U/L — SIGNIFICANT CHANGE UP (ref 10–45)
ANION GAP SERPL CALC-SCNC: 16 MMOL/L — SIGNIFICANT CHANGE UP (ref 5–17)
ANION GAP SERPL CALC-SCNC: 18 MMOL/L — HIGH (ref 5–17)
AST SERPL-CCNC: 14 U/L — SIGNIFICANT CHANGE UP (ref 10–40)
AST SERPL-CCNC: 26 U/L — SIGNIFICANT CHANGE UP (ref 10–40)
BASE EXCESS BLDV CALC-SCNC: -1.9 MMOL/L — SIGNIFICANT CHANGE UP (ref -2–2)
BASOPHILS # BLD AUTO: 0.1 K/UL — SIGNIFICANT CHANGE UP (ref 0–0.2)
BASOPHILS NFR BLD AUTO: 0.7 % — SIGNIFICANT CHANGE UP (ref 0–2)
BILIRUB SERPL-MCNC: 0.4 MG/DL — SIGNIFICANT CHANGE UP (ref 0.2–1.2)
BILIRUB SERPL-MCNC: 0.4 MG/DL — SIGNIFICANT CHANGE UP (ref 0.2–1.2)
BUN SERPL-MCNC: 72 MG/DL — HIGH (ref 7–23)
BUN SERPL-MCNC: 72 MG/DL — HIGH (ref 7–23)
CA-I SERPL-SCNC: 1.27 MMOL/L — SIGNIFICANT CHANGE UP (ref 1.12–1.3)
CALCIUM SERPL-MCNC: 10.2 MG/DL — SIGNIFICANT CHANGE UP (ref 8.4–10.5)
CALCIUM SERPL-MCNC: 10.5 MG/DL — SIGNIFICANT CHANGE UP (ref 8.4–10.5)
CHLORIDE BLDV-SCNC: 101 MMOL/L — SIGNIFICANT CHANGE UP (ref 96–108)
CHLORIDE SERPL-SCNC: 98 MMOL/L — SIGNIFICANT CHANGE UP (ref 96–108)
CHLORIDE SERPL-SCNC: 99 MMOL/L — SIGNIFICANT CHANGE UP (ref 96–108)
CO2 BLDV-SCNC: 23 MMOL/L — SIGNIFICANT CHANGE UP (ref 22–30)
CO2 SERPL-SCNC: 18 MMOL/L — LOW (ref 22–31)
CO2 SERPL-SCNC: 19 MMOL/L — LOW (ref 22–31)
CREAT SERPL-MCNC: 9.55 MG/DL — HIGH (ref 0.5–1.3)
CREAT SERPL-MCNC: 9.69 MG/DL — HIGH (ref 0.5–1.3)
EOSINOPHIL # BLD AUTO: 0.5 K/UL — SIGNIFICANT CHANGE UP (ref 0–0.5)
EOSINOPHIL NFR BLD AUTO: 4.6 % — SIGNIFICANT CHANGE UP (ref 0–6)
GAS PNL BLDV: 134 MMOL/L — LOW (ref 135–145)
GAS PNL BLDV: SIGNIFICANT CHANGE UP
GAS PNL BLDV: SIGNIFICANT CHANGE UP
GLUCOSE BLDV-MCNC: 77 MG/DL — SIGNIFICANT CHANGE UP (ref 70–99)
GLUCOSE SERPL-MCNC: 100 MG/DL — HIGH (ref 70–99)
GLUCOSE SERPL-MCNC: 75 MG/DL — SIGNIFICANT CHANGE UP (ref 70–99)
HBV SURFACE AB SER-ACNC: <3 MIU/ML — LOW
HBV SURFACE AB SER-ACNC: SIGNIFICANT CHANGE UP
HBV SURFACE AG SER-ACNC: REACTIVE
HCO3 BLDV-SCNC: 22 MMOL/L — SIGNIFICANT CHANGE UP (ref 21–29)
HCT VFR BLD CALC: 34 % — LOW (ref 34.5–45)
HCT VFR BLD CALC: 34.3 % — LOW (ref 34.5–45)
HCT VFR BLDA CALC: 35 % — LOW (ref 39–50)
HGB BLD CALC-MCNC: 11.4 G/DL — LOW (ref 11.5–15.5)
HGB BLD-MCNC: 11.2 G/DL — LOW (ref 11.5–15.5)
HGB BLD-MCNC: 11.3 G/DL — LOW (ref 11.5–15.5)
LACTATE BLDV-MCNC: 1.8 MMOL/L — SIGNIFICANT CHANGE UP (ref 0.7–2)
LYMPHOCYTES # BLD AUTO: 2.5 K/UL — SIGNIFICANT CHANGE UP (ref 1–3.3)
LYMPHOCYTES # BLD AUTO: 22.4 % — SIGNIFICANT CHANGE UP (ref 13–44)
MCHC RBC-ENTMCNC: 31.6 PG — SIGNIFICANT CHANGE UP (ref 27–34)
MCHC RBC-ENTMCNC: 31.6 PG — SIGNIFICANT CHANGE UP (ref 27–34)
MCHC RBC-ENTMCNC: 32.7 GM/DL — SIGNIFICANT CHANGE UP (ref 32–36)
MCHC RBC-ENTMCNC: 33.2 GM/DL — SIGNIFICANT CHANGE UP (ref 32–36)
MCV RBC AUTO: 95.2 FL — SIGNIFICANT CHANGE UP (ref 80–100)
MCV RBC AUTO: 96.9 FL — SIGNIFICANT CHANGE UP (ref 80–100)
MONOCYTES # BLD AUTO: 0.8 K/UL — SIGNIFICANT CHANGE UP (ref 0–0.9)
MONOCYTES NFR BLD AUTO: 7.3 % — SIGNIFICANT CHANGE UP (ref 2–14)
NEUTROPHILS # BLD AUTO: 7.4 K/UL — SIGNIFICANT CHANGE UP (ref 1.8–7.4)
NEUTROPHILS NFR BLD AUTO: 65.1 % — SIGNIFICANT CHANGE UP (ref 43–77)
PCO2 BLDV: 34 MMHG — LOW (ref 35–50)
PH BLDV: 7.42 — SIGNIFICANT CHANGE UP (ref 7.35–7.45)
PLATELET # BLD AUTO: 263 K/UL — SIGNIFICANT CHANGE UP (ref 150–400)
PLATELET # BLD AUTO: 276 K/UL — SIGNIFICANT CHANGE UP (ref 150–400)
PO2 BLDV: 41 MMHG — SIGNIFICANT CHANGE UP (ref 25–45)
POTASSIUM BLDV-SCNC: 5.4 MMOL/L — HIGH (ref 3.5–5.3)
POTASSIUM SERPL-MCNC: 5.3 MMOL/L — SIGNIFICANT CHANGE UP (ref 3.5–5.3)
POTASSIUM SERPL-MCNC: 6.1 MMOL/L — HIGH (ref 3.5–5.3)
POTASSIUM SERPL-SCNC: 5.3 MMOL/L — SIGNIFICANT CHANGE UP (ref 3.5–5.3)
POTASSIUM SERPL-SCNC: 6.1 MMOL/L — HIGH (ref 3.5–5.3)
PROT SERPL-MCNC: 6.8 G/DL — SIGNIFICANT CHANGE UP (ref 6–8.3)
PROT SERPL-MCNC: 7 G/DL — SIGNIFICANT CHANGE UP (ref 6–8.3)
RBC # BLD: 3.54 M/UL — LOW (ref 3.8–5.2)
RBC # BLD: 3.57 M/UL — LOW (ref 3.8–5.2)
RBC # FLD: 12.8 % — SIGNIFICANT CHANGE UP (ref 10.3–14.5)
RBC # FLD: 13.2 % — SIGNIFICANT CHANGE UP (ref 10.3–14.5)
SAO2 % BLDV: 73 % — SIGNIFICANT CHANGE UP (ref 67–88)
SODIUM SERPL-SCNC: 133 MMOL/L — LOW (ref 135–145)
SODIUM SERPL-SCNC: 135 MMOL/L — SIGNIFICANT CHANGE UP (ref 135–145)
TROPONIN T, HIGH SENSITIVITY RESULT: 28 NG/L — SIGNIFICANT CHANGE UP (ref 0–51)
TROPONIN T, HIGH SENSITIVITY RESULT: 28 NG/L — SIGNIFICANT CHANGE UP (ref 0–51)
WBC # BLD: 11.3 K/UL — HIGH (ref 3.8–10.5)
WBC # BLD: 11.36 K/UL — HIGH (ref 3.8–10.5)
WBC # FLD AUTO: 11.3 K/UL — HIGH (ref 3.8–10.5)
WBC # FLD AUTO: 11.36 K/UL — HIGH (ref 3.8–10.5)

## 2019-08-19 PROCEDURE — 96375 TX/PRO/DX INJ NEW DRUG ADDON: CPT

## 2019-08-19 PROCEDURE — 85027 COMPLETE CBC AUTOMATED: CPT

## 2019-08-19 PROCEDURE — 99261: CPT

## 2019-08-19 PROCEDURE — 84484 ASSAY OF TROPONIN QUANT: CPT

## 2019-08-19 PROCEDURE — 82435 ASSAY OF BLOOD CHLORIDE: CPT

## 2019-08-19 PROCEDURE — 96376 TX/PRO/DX INJ SAME DRUG ADON: CPT

## 2019-08-19 PROCEDURE — 82803 BLOOD GASES ANY COMBINATION: CPT

## 2019-08-19 PROCEDURE — 83605 ASSAY OF LACTIC ACID: CPT

## 2019-08-19 PROCEDURE — 87340 HEPATITIS B SURFACE AG IA: CPT

## 2019-08-19 PROCEDURE — 71045 X-RAY EXAM CHEST 1 VIEW: CPT

## 2019-08-19 PROCEDURE — 85014 HEMATOCRIT: CPT

## 2019-08-19 PROCEDURE — 99223 1ST HOSP IP/OBS HIGH 75: CPT | Mod: GC

## 2019-08-19 PROCEDURE — 93005 ELECTROCARDIOGRAM TRACING: CPT

## 2019-08-19 PROCEDURE — 94660 CPAP INITIATION&MGMT: CPT

## 2019-08-19 PROCEDURE — 99291 CRITICAL CARE FIRST HOUR: CPT | Mod: 25

## 2019-08-19 PROCEDURE — 96374 THER/PROPH/DIAG INJ IV PUSH: CPT

## 2019-08-19 PROCEDURE — 93010 ELECTROCARDIOGRAM REPORT: CPT

## 2019-08-19 PROCEDURE — 99291 CRITICAL CARE FIRST HOUR: CPT

## 2019-08-19 PROCEDURE — 80053 COMPREHEN METABOLIC PANEL: CPT

## 2019-08-19 PROCEDURE — 71045 X-RAY EXAM CHEST 1 VIEW: CPT | Mod: 26

## 2019-08-19 PROCEDURE — 83880 ASSAY OF NATRIURETIC PEPTIDE: CPT

## 2019-08-19 PROCEDURE — 84132 ASSAY OF SERUM POTASSIUM: CPT

## 2019-08-19 PROCEDURE — 86790 VIRUS ANTIBODY NOS: CPT

## 2019-08-19 PROCEDURE — 84295 ASSAY OF SERUM SODIUM: CPT

## 2019-08-19 PROCEDURE — 82947 ASSAY GLUCOSE BLOOD QUANT: CPT

## 2019-08-19 PROCEDURE — 86706 HEP B SURFACE ANTIBODY: CPT

## 2019-08-19 PROCEDURE — 12345: CPT | Mod: NC,GC

## 2019-08-19 PROCEDURE — 87517 HEPATITIS B DNA QUANT: CPT

## 2019-08-19 PROCEDURE — 82330 ASSAY OF CALCIUM: CPT

## 2019-08-19 RX ORDER — LABETALOL HCL 100 MG
300 TABLET ORAL EVERY 12 HOURS
Refills: 0 | Status: DISCONTINUED | OUTPATIENT
Start: 2019-08-19 | End: 2019-08-19

## 2019-08-19 RX ORDER — DEXTROSE 50 % IN WATER 50 %
25 SYRINGE (ML) INTRAVENOUS ONCE
Refills: 0 | Status: COMPLETED | OUTPATIENT
Start: 2019-08-19 | End: 2019-08-19

## 2019-08-19 RX ORDER — FUROSEMIDE 40 MG
80 TABLET ORAL ONCE
Refills: 0 | Status: COMPLETED | OUTPATIENT
Start: 2019-08-19 | End: 2019-08-19

## 2019-08-19 RX ORDER — HYDRALAZINE HCL 50 MG
20 TABLET ORAL ONCE
Refills: 0 | Status: COMPLETED | OUTPATIENT
Start: 2019-08-19 | End: 2019-08-19

## 2019-08-19 RX ORDER — FUROSEMIDE 40 MG
80 TABLET ORAL ONCE
Refills: 0 | Status: DISCONTINUED | OUTPATIENT
Start: 2019-08-19 | End: 2019-08-19

## 2019-08-19 RX ORDER — HYDRALAZINE HCL 50 MG
25 TABLET ORAL ONCE
Refills: 0 | Status: DISCONTINUED | OUTPATIENT
Start: 2019-08-19 | End: 2019-08-19

## 2019-08-19 RX ORDER — METOCLOPRAMIDE HCL 10 MG
10 TABLET ORAL ONCE
Refills: 0 | Status: COMPLETED | OUTPATIENT
Start: 2019-08-19 | End: 2019-08-19

## 2019-08-19 RX ORDER — LIDOCAINE AND PRILOCAINE CREAM 25; 25 MG/G; MG/G
1 CREAM TOPICAL
Qty: 0 | Refills: 0 | DISCHARGE

## 2019-08-19 RX ORDER — CALCIUM GLUCONATE 100 MG/ML
1 VIAL (ML) INTRAVENOUS ONCE
Refills: 0 | Status: COMPLETED | OUTPATIENT
Start: 2019-08-19 | End: 2019-08-19

## 2019-08-19 RX ORDER — ERYTHROPOIETIN 10000 [IU]/ML
6000 INJECTION, SOLUTION INTRAVENOUS; SUBCUTANEOUS
Refills: 0 | Status: DISCONTINUED | OUTPATIENT
Start: 2019-08-19 | End: 2019-08-19

## 2019-08-19 RX ORDER — INSULIN HUMAN 100 [IU]/ML
5 INJECTION, SOLUTION SUBCUTANEOUS ONCE
Refills: 0 | Status: COMPLETED | OUTPATIENT
Start: 2019-08-19 | End: 2019-08-19

## 2019-08-19 RX ORDER — CANDESARTAN CILEXETIL 8 MG/1
1 TABLET ORAL
Qty: 0 | Refills: 0 | DISCHARGE

## 2019-08-19 RX ORDER — HEPARIN SODIUM 5000 [USP'U]/ML
5000 INJECTION INTRAVENOUS; SUBCUTANEOUS EVERY 8 HOURS
Refills: 0 | Status: DISCONTINUED | OUTPATIENT
Start: 2019-08-19 | End: 2019-08-19

## 2019-08-19 RX ORDER — FENTANYL CITRATE 50 UG/ML
25 INJECTION INTRAVENOUS ONCE
Refills: 0 | Status: DISCONTINUED | OUTPATIENT
Start: 2019-08-19 | End: 2019-08-19

## 2019-08-19 RX ORDER — LOSARTAN POTASSIUM 100 MG/1
50 TABLET, FILM COATED ORAL DAILY
Refills: 0 | Status: DISCONTINUED | OUTPATIENT
Start: 2019-08-19 | End: 2019-08-19

## 2019-08-19 RX ADMIN — Medication 20 MILLIGRAM(S): at 03:07

## 2019-08-19 RX ADMIN — FENTANYL CITRATE 25 MICROGRAM(S): 50 INJECTION INTRAVENOUS at 04:16

## 2019-08-19 RX ADMIN — Medication 20 MILLIGRAM(S): at 04:16

## 2019-08-19 RX ADMIN — Medication 2 TABLET(S): at 11:47

## 2019-08-19 RX ADMIN — Medication 10 MILLIGRAM(S): at 02:29

## 2019-08-19 RX ADMIN — FENTANYL CITRATE 25 MICROGRAM(S): 50 INJECTION INTRAVENOUS at 03:28

## 2019-08-19 RX ADMIN — Medication 80 MILLIGRAM(S): at 04:59

## 2019-08-19 RX ADMIN — Medication 2 TABLET(S): at 14:53

## 2019-08-19 NOTE — CONSULT NOTE ADULT - ATTENDING COMMENTS
ESRD on HD MWF DR Nunn at [facility] membranous Hep B treated admitted for SOB  She needs optimized BP control   Headaches may be secondary to HTN  UF today for volume removal with dialysis

## 2019-08-19 NOTE — H&P ADULT - PROBLEM SELECTOR PLAN 3
- Patient found to be hypertensive to systolic 200  - will allow for permissive hypertension  - continue home labetalol 300q12   - monitor blood pressures closely

## 2019-08-19 NOTE — ED ADULT NURSE NOTE - NSIMPLEMENTINTERV_GEN_ALL_ED
[facility]  Post op call note - No Answer    Pavithra Laurent was called for an update regarding her recovery.  There was no answer and a message was left instructing the patient to call the office with any questions or concerns.     Tracey Meek PA-C  
Implemented All Fall Risk Interventions:  [place] to call system. Call bell, personal items and telephone within reach. Instruct patient to call for assistance. Room bathroom lighting operational. Non-slip footwear when patient is off stretcher. Physically safe environment: no spills, clutter or unnecessary equipment. Stretcher in lowest position, wheels locked, appropriate side rails in place. Provide visual cue, wrist band, yellow gown, etc. Monitor gait and stability. Monitor for mental status changes and reorient to person, place, and time. Review medications for side effects contributing to fall risk. Reinforce activity limits and safety measures with patient and family.

## 2019-08-19 NOTE — DISCHARGE NOTE PROVIDER - HOSPITAL COURSE
The patient is a 49 year old female with ESRD due to nephrotic syndrome secondary to membranoproliferative glomerulonephritis, active hepatitis B infection, and hypertension who presented to with acute shortness of breath. She ordinarily receives dialysis MWF and was last dialyzed on 8/16. She had lower extremity edema, decreased breath sounds, and chest x-ray concerning for mild congestion. She was started on BIPAP and IV Lasix in the ED to relieve her hypervolemia. Nephro was consulted in the ED, and they scheduled her for dialysis in the AM as her usual schedule is MWF for dialysis. She had clinical improvement s/p BIPAP and IV Lasix, BIPAP was discontinued prior to dialysis. In addition to her volume overload, she was found to have hyperkalemia and elevated blood pressures to systolic 200s in the ED. Repeat AM potassium was within normal limits. She was given hydralazine x2 in the ED and was then started on her home labetalol dose. Headache was controlled with acetaminophen 325 mg/butalbital 50 mG/caffeine 40 mG. Patient felt better after dialysis and asked to go home. Blood pressure 169/90 s/p dialysis. Consulted nephrology, they deemed the patient is stable for discharge. The patient is a 49 year old female with ESRD due to nephrotic syndrome secondary to membranoproliferative glomerulonephritis, active hepatitis B infection, and hypertension who presented to with acute shortness of breath. She ordinarily receives dialysis MWF and was last dialyzed on 8/16. She had lower extremity edema, decreased breath sounds, and chest x-ray concerning for mild congestion. She was started on BIPAP and IV Lasix in the ED to relieve her hypervolemia. Nephro was consulted in the ED, and they scheduled her for dialysis in the AM as her usual schedule is MWF for dialysis. She had clinical improvement s/p BIPAP and IV Lasix, BIPAP was discontinued prior to dialysis. In addition to her volume overload, she was found to have hyperkalemia and elevated blood pressures to systolic 200s in the ED. Repeat AM potassium was within normal limits. She was given hydralazine x2 in the ED and was then started on her home labetalol dose. Headache was controlled with acetaminophen 325 mg/butalbital 50 mG/caffeine 40 mG. Patient felt better after dialysis and asked to go home. Blood pressure 169/90 s/p dialysis. Consulted nephrology, they deemed the patient is stable for discharge with follow up with her nephrologist outpatient.

## 2019-08-19 NOTE — ED PROVIDER NOTE - CARE PLAN
Principal Discharge DX:	Hypervolemia associated with renal insufficiency  Secondary Diagnosis:	ESRD (end stage renal disease) on dialysis Principal Discharge DX:	Hypertensive emergency  Secondary Diagnosis:	ESRD (end stage renal disease) on dialysis

## 2019-08-19 NOTE — ED ADULT NURSE REASSESSMENT NOTE - NS ED NURSE REASSESS COMMENT FT1
Patient requested to go to restroom. Patient offered NC for oxygen requirements, patient declined at this time. Patient taken off BiPAP by MD Hanna and walked to restroom and back to room by MD. Patient re-educated on importance of keeping Bipap mask on and with a tight seal. patient verbalized understanding. Pt placed in position of comfort. Pt educated on call bell system and provided call bell. Bed in lowest position, wheels locked, appropriate side rails raised. Pt denies needs at this time.

## 2019-08-19 NOTE — H&P ADULT - ATTENDING COMMENTS
Patient seen and examined at bedside with resident. 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 Roque), HTN and obesity presents to [facility] with acute shortness of breath for one day when she was sleeping and woke up. Patient states she was last fully dialyzed on 8/16/19 as scheduled and they removed 750 cc of fluid. She states because she still urinates they mostly perform filtration and due not remove fluids. She states she is compliant with her anti-hypertensive medications, yet her BP was consistently in the 180s systolic at home. Patient has a similar presentation in January 2019 for hypertensive emergency causing pulmonary edema. In the ED patient noted to have BP of 200/100 and given Hydralazine 20 mg IV x2, Lasix, and started on BiPap. When seen by me patient states her shortness of breath has improved. Renal contacted by ED and will try to dialyze patient in AM. Patient states she is on the transplant list and is actively being treated for hepatitis B.    Labs/Imaging:  CBC unremarkable, K: 6.1 (hemolyzed) and 5.4 on shock panel  BUN/Cr: 72/9.55  Pro-BNP 24,000+  Trop: 28  AST/ALT: 26/19    EKG: possible peaked T waves (reviewed by resident)  CXR personally reviewed by me, moderate congestion noted just right of the sternum    Plan:    #Hypertensive Emergency causing Pulmonary Edema and Shortness of Breath  -Tentative plan for patient to be dialyzed first thing in the morning  -Maintain bipap which should help reduce preload  -Will restart patient’s home PO medications with hold parameters to ensure BP isn’t lowered by more than 20-25% in the first 24 hours    #Active Hepatitis B infection – LFTs unremarkable, will repeat Hep B S Ag/Ab and E Ag/Ab, consider hepatitis B VL, need to find out which medication she is taking and continue because she will likely require long term therapy if she is going for transplant and may be immunosuppressed in future    #Membranoproliferative Glomerulonephritis causing ESRD – dialyze as schedule via left AVF, follow up outpatient for transplant    #Obesity – Nutrition consult    Rest of plan as documented by resident Patient seen and examined at bedside with resident. 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 Roque), HTN and obesity presents to [facility] with acute shortness of breath for one day when she was sleeping and woke up. Patient states she was last fully dialyzed on 8/16/19 as scheduled and they removed 750 cc of fluid. She states because she still urinates they mostly perform filtration and due not remove fluids. She states she is compliant with her anti-hypertensive medications, yet her BP was consistently in the 180s systolic at home. Patient has a similar presentation in January 2019 for hypertensive emergency causing pulmonary edema. In the ED patient noted to have BP of 200/100 and given Hydralazine 20 mg IV x2, Lasix, and started on BiPap. When seen by me patient states her shortness of breath has improved. Renal contacted by ED and will try to dialyze patient in AM. Patient states she is on the transplant list and is actively being treated for hepatitis B.    Labs/Imaging:  CBC unremarkable, K: 6.1 (hemolyzed) and 5.4 on shock panel  BUN/Cr: 72/9.55  Pro-BNP 24,000+  Trop: 28  AST/ALT: 26/19    EKG: possible peaked T waves (reviewed by resident)  CXR personally reviewed by me, moderate congestion noted just right of the sternum    Plan:    #Hypertensive Emergency causing Pulmonary Edema and Shortness of Breath  -Tentative plan for patient to be dialyzed first thing in the morning  -Maintain bipap which should help reduce preload  -Will restart patient’s home PO medications with hold parameters to ensure BP isn’t lowered by more than 20-25% in the first 24 hours    #Active Hepatitis B infection – LFTs unremarkable, will repeat Hep B S Ag/Ab and Be Ag/Ab, consider hepatitis B VL, need to find out which medication she is taking and continue because she will likely require long term therapy if she is going for transplant and may be immunosuppressed in future    #Membranoproliferative Glomerulonephritis causing ESRD – dialyze as schedule via left AVF, follow up outpatient for transplant    #Obesity – Nutrition consult    Rest of plan as documented by resident

## 2019-08-19 NOTE — ED ADULT NURSE NOTE - OBJECTIVE STATEMENT
48 Yo female PMHx nephrotic kidneys dialysis MWF c/o SOB for 2 hours and HA. Patient reports that the SOB woke her up from her sleep. She has had this feeling before and was dx with pleural effusions and reports that she feels similarly to that instance. Patient also endorsing productive cough with white colored sputum. Patient A&OX3, airway patent, respirations labored, tachypnea noted upon arrival, patient declining O2 at this time. skin warm, dry and pink. Last dialysis was this past Friday, HA during dialysis but otherwise uneventful. Patient HOB raised, pillow offered, patient declines. Patient having difficulty completing sentences without taking breaths in between. Patient also having difficulty breathing in through nose and requires mouth breathing stating "I feel like there isn't enough oxygen" No audible wheezes or rhonci noted, patient not diaphoretic at this time. Skin warm, dry and pink. denies N/V/D, abdominal pain, fever/chills, urinary symptoms, hematuria, weakness, dizziness, numbness, tinging. Peripheral pulses present b/l. Skin warm, dry and pink. Pt placed in position of comfort. Pt educated on call bell system and provided call bell. Bed in lowest position, wheels locked, appropriate side rails raised. Pt denies needs at this time.

## 2019-08-19 NOTE — H&P ADULT - NSICDXPASTMEDICALHX_GEN_ALL_CORE_FT
PAST MEDICAL HISTORY:  Chronic Hepatitis B     Dyslipidemia     Endometriosis     GERD (gastroesophageal reflux disease)     History of Nephrotic Syndrome     HTN (hypertension)     Membranous Glomerulonephropathy     Obesity, Class III, BMI 40-49.9 (morbid obesity)

## 2019-08-19 NOTE — CONSULT NOTE ADULT - PROBLEM SELECTOR RECOMMENDATION 9
ESRD on HD MWF presenting with SOB and headache and HTN concerning for fluid overload. CXR shows mild congestion. As today is patient's dialysis day will dialyze during the first shift. Continue to monitor respiratory status through dialysis along with symptoms of headache and possible cramping. Dose all medications for dialysis. Patient does continue to urinate as well and can use diuretics as well as needed.

## 2019-08-19 NOTE — ED PROVIDER NOTE - OBJECTIVE STATEMENT
49F PMH ESRD on HD (M,W,F) secondary to membranoproliferative glomerulonephritis, Hep B infection here for SOB. Reports it began at midnight, woke pt from sleep, pt unable to take deep breaths w/o pain. No CP/palpitations. Reports cold several weeks ago since resolved. Last dialysis was 2 days ago on Friday. Pt hosp. for similar symptoms Jan '19 w/ diagnosed pulm edema likely d/t volume overload.     Pt denies N/V/D,  no Abd pain, no dysuria, no decreased urinary output.     Pt tachypneic to 30 in ED, elevated to 180s/100s

## 2019-08-19 NOTE — ED ADULT NURSE REASSESSMENT NOTE - NS ED NURSE REASSESS COMMENT FT1
Patient attempting to take off Bipap mask. Patient states "I can't breath, it is making me dizzy and nauseous". Patient educated that the Bipap mask is necessary for her tachypnea and will improve. MD Donovan made aware. MD will speak with patient

## 2019-08-19 NOTE — ED ADULT NURSE REASSESSMENT NOTE - NS ED NURSE REASSESS COMMENT FT1
Patient sitting up in bed, unable to complete sentences and respirations labored. Patient placed back on Bipap by MD Donovan. Patient sitting up in bed, unable to complete sentences and respirations labored. Patient staes "I cannot breath because of my HA" Patient placed back on Bipap by MD Donovan and fentanyl ordered by MD.

## 2019-08-19 NOTE — PROGRESS NOTE ADULT - PROBLEM SELECTOR PLAN 1
The patient presented to the ED with acute SOB. Patient's xray was read as clear however ED found it concerning for pulmonary congestion. Patient was placed on BIPAP and given IV Lasix 80 mg. Clinically improved, no longer requiring BIPAP and breathing comfortably with SpO2 98% on room air. She is now receiving dialysis as her normal sessions are MWF.  - reassess after dialysis  - monitor symptoms

## 2019-08-19 NOTE — ED ADULT NURSE REASSESSMENT NOTE - NS ED NURSE REASSESS COMMENT FT1
Patient attempting to loosen bipap mask. Patient educated that a tight seal is necessary for adequate oxygenation. Patient states "It is not comfortable I cannot sleep like this". Continues to loosen Bipap mask. Md Donovan made aware.

## 2019-08-19 NOTE — ED PROVIDER NOTE - PROGRESS NOTE DETAILS
pt tachypneic to 25, still SOB, started on Bipap 10/5 fio2 21, reglan 10 mg iv for headache, am watching closely. pt hypertensive to 200/100 was given 20 hydralazine, upon reassessment 25 min later 170s/90s. tolerating bipap however keeps removing mask pt hypertensive to 200/100 again, further 20 of hydralazine given.

## 2019-08-19 NOTE — H&P ADULT - PROBLEM SELECTOR PROBLEM 2
Received refill request from [facility] for Uptravi 1200 mcg tabs.  Called and spoke with Pharmacist at [facility] who states patient reported yesterday to chikis nurse that she was holding at 1200 mcg and having diarrhea.  Advised Pharmacist this was different than what the patient told me 2 days ago so I would call patient to clarify what dose she is on.  He agreed.  -----------------------------  LM for patient advising her of the confusing between [facility] and me.  Asked her to call me back and clarify what dose of Uptravi she is taking.  Keyanna Mccray RN on 8/9/2018 at 12:34 PM  ---------------------------  LM for patient to call me back and clarify what dose of Uptravi she is on and what day she increases her dose. Direct number left. Keyanna Mccray RN on 8/10/2018 at 3:14 PM  ---------------------------  Patient called and stated she was on 1400 mcg and will be increasing Monday 8/13 to the goal dose of 1600 mcg.    Patient confirms she has had Diarrhea with the last few dose increases of Uptravi, but has been using Imodium.     Nurse who sets up her meds is coming out Monday to set up her new dose again. Agreed with plan. Keyanna Mccray RN on 8/10/2018 at 3:45 PM    
ESRD (end stage renal disease) on dialysis

## 2019-08-19 NOTE — H&P ADULT - PROBLEM SELECTOR PLAN 2
- patient on dialysis M/W/F  - renal consulted by ED, patient to get dialyzed today  - currently on the renal transplant list

## 2019-08-19 NOTE — CONSULT NOTE ADULT - PROBLEM SELECTOR RECOMMENDATION 2
Patient presents with hypertensive emergency and SOB. Likely volume mediated and will get dialysis to help BP. Continue to monitor for worsening symptoms of HTN and shortness of breath.

## 2019-08-19 NOTE — PROGRESS NOTE ADULT - PROBLEM SELECTOR PLAN 2
- patient on dialysis M/W/F  - renal consulted by ED, patient received dialysis this AM  - currently on the renal transplant list  - patient has lower extremity edema and calf cramping, will order compression stockings  - will f/u nephro recs

## 2019-08-19 NOTE — H&P ADULT - NSHPLABSRESULTS_GEN_ALL_CORE
To be completed by resident CBC Full  -  ( 19 Aug 2019 02:14 )  WBC Count : 11.3 K/uL  Hemoglobin : 11.3 g/dL  Hematocrit : 34.0 %  Platelet Count - Automated : 276 K/uL  Mean Cell Volume : 95.2 fl  Mean Cell Hemoglobin : 31.6 pg  Mean Cell Hemoglobin Concentration : 33.2 gm/dL  Auto Neutrophil # : 7.4 K/uL  Auto Lymphocyte # : 2.5 K/uL  Auto Monocyte # : 0.8 K/uL  Auto Eosinophil # : 0.5 K/uL  Auto Basophil # : 0.1 K/uL  Auto Neutrophil % : 65.1 %  Auto Lymphocyte % : 22.4 %  Auto Monocyte % : 7.3 %  Auto Eosinophil % : 4.6 %  Auto Basophil % : 0.7 %    08-19    135  |  98  |  72<H>  ----------------------------<  100<H>  5.3   |  19<L>  |  9.69<H>    Ca    10.2      19 Aug 2019 05:33    TPro  7.0  /  Alb  4.2  /  TBili  0.4  /  DBili  x   /  AST  14  /  ALT  21  /  AlkPhos  42  08-19    LIVER FUNCTIONS - ( 19 Aug 2019 05:33 )  Alb: 4.2 g/dL / Pro: 7.0 g/dL / ALK PHOS: 42 U/L / ALT: 21 U/L / AST: 14 U/L / GGT: x             < from: Xray Chest 1 View AP/PA (08.19.19 @ 02:07) >    PROCEDURE DATE:  08/19/2019      ******PRELIMINARY REPORT******    ******PRELIMINARY REPORT******              INTERPRETATION:  No urgent findings              ******PRELIMINARY REPORT******    ******PRELIMINARY REPORT******          LON SHORE M.D., RADIOLOGY RESIDENT    < end of copied text >    CXR concerning for pulmonary congestion  EKG: normal sinus rhythm. Peaked T waves

## 2019-08-19 NOTE — ED ADULT NURSE REASSESSMENT NOTE - NS ED NURSE REASSESS COMMENT FT1
Pt ambulated to bathroom, Pt requesting to be off of bipap, Pt sating 100% on RA. MD paged for approval. Pt brought hot packs for leg cramps.  at bedside Pt on O2 and cardiac monitoring.

## 2019-08-19 NOTE — H&P ADULT - NSHPPHYSICALEXAM_GEN_ALL_CORE
To be completed by resident PHYSICAL EXAM:    Vital Signs Last 24 Hrs  T(C): 36.4 (19 Aug 2019 01:36), Max: 36.4 (19 Aug 2019 00:55)  T(F): 97.5 (19 Aug 2019 01:36), Max: 97.5 (19 Aug 2019 00:55)  HR: 77 (19 Aug 2019 05:06) (68 - 81)  BP: 169/125 (19 Aug 2019 05:00) (169/125 - 200/105)  BP(mean): 137 (19 Aug 2019 05:00) (137 - 137)  RR: 15 (19 Aug 2019 05:00) (15 - 24)  SpO2: 100% (19 Aug 2019 05:06) (98% - 100%)    General: No acute distress.  HEENT: No scleral icterus or injection.  Moist MM.  No oropharyngeal exudates.    Neck: Supple.  Full ROM.  No JVD.  No lymphadenopathy.   Heart: RRR.  Normal S1 and S2.  + systolic murmur  Lungs: Decreased breath sounds b/l   Abdomen: BS+, soft, NT/ND.    Skin: Warm and dry.  No rashes.  Extremities: No edema, clubbing, or cyanosis.  2+ peripheral pulses b/l.  Musculoskeletal: No deformities.   Neuro: A&Ox3.  No neuro focal deficits PHYSICAL EXAM:    Vital Signs Last 24 Hrs  T(C): 36.4 (19 Aug 2019 01:36), Max: 36.4 (19 Aug 2019 00:55)  T(F): 97.5 (19 Aug 2019 01:36), Max: 97.5 (19 Aug 2019 00:55)  HR: 77 (19 Aug 2019 05:06) (68 - 81)  BP: 169/125 (19 Aug 2019 05:00) (169/125 - 200/105)  BP(mean): 137 (19 Aug 2019 05:00) (137 - 137)  RR: 15 (19 Aug 2019 05:00) (15 - 24)  SpO2: 100% (19 Aug 2019 05:06) (98% - 100%)    General: No acute distress.  HEENT: No scleral icterus or injection.  Moist MM.  No oropharyngeal exudates.    Neck: Supple.  Full ROM.  No JVD.  No lymphadenopathy.   Heart: RRR.  Normal S1 and S2.  + systolic murmur  Lungs: Decreased breath sounds b/l   Abdomen: BS+, soft, NT/ND.    Skin: Warm and dry.  No rashes.  Extremities: No edema, clubbing, or cyanosis.  2+ peripheral pulses b/l. LUE AVF   Musculoskeletal: No deformities.   Neuro: A&Ox3.  No neuro focal deficits

## 2019-08-19 NOTE — ED PROVIDER NOTE - NS ED ROS FT
Gen: Denies fever, weight loss  CV: Denies chest pain, palpitations  Skin: Denies rash, erythema, color changes  Resp: + SOB, no cough  Endo: Denies sensitivity to heat, cold, increased urination  GI: Denies constipation, nausea, vomiting  Msk: Denies back pain, LE swelling, extremity pain  : Denies dysuria, increased frequency  Neuro: Denies LOC, weakness, seizures  Psych: Denies hx of psych, hallucinations

## 2019-08-19 NOTE — H&P ADULT - NSHPREVIEWOFSYSTEMS_GEN_ALL_CORE
To be completed To be completed by resident REVIEW OF SYSTEMS:    CONSTITUTIONAL: No weakness, fevers or chills  EYES/ENT: No visual changes;  No vertigo or throat pain   NECK: No pain or stiffness  RESPIRATORY: No cough, wheezing, hemoptysis; + shortness of breath  CARDIOVASCULAR: No chest pain or palpitations  GASTROINTESTINAL: No abdominal pain; nausea, vomiting;  diarrhea or constipation. No hematemeses, melena or hematochezia.  GENITOURINARY: No dysuria, frequency or hematuria  NEUROLOGICAL: No numbness or weakness  SKIN: No itching, burning, rashes, or lesions   MUSCULOSKEL: some cramping of b/l legs  All other review of systems is negative unless indicated above. REVIEW OF SYSTEMS:    CONSTITUTIONAL: No weakness, fevers or chills  EYES/ENT: No visual changes;  No vertigo or throat pain   NECK: No pain or stiffness  RESPIRATORY: No cough, wheezing, hemoptysis; + shortness of breath  CARDIOVASCULAR: No chest pain or palpitations  GASTROINTESTINAL: No abdominal pain; nausea, vomiting;  diarrhea or constipation. No hematemeses, melena or hematochezia.  GENITOURINARY: No dysuria, frequency or hematuria  NEUROLOGICAL: No numbness or weakness  SKIN: No itching, burning, rashes, or lesions   MUSCULOSKEL: some cramping of b/l legs  Psych: no depression, changes in sleep, changes in concentration, or lack of energy  Heme/Onc: no purpura, petechiae or night sweats

## 2019-08-19 NOTE — PROGRESS NOTE ADULT - PROBLEM SELECTOR PLAN 3
- Patient found to be hypertensive to systolic 200 in ED with pro-BNP elevation  - continue home labetalol 300q12   - monitor blood pressures closely  - pain control for headache - Patient found to be hypertensive to systolic 200 in ED with pro-BNP elevation  - continue home labetalol 300q12   - Received hemodialysis this morning  - monitor blood pressures closely  - pain control for headache

## 2019-08-19 NOTE — DISCHARGE NOTE PROVIDER - CARE PROVIDER_API CALL
Neo Nunn)  Internal Medicine; Nephrology  [address] [place] [address]  Phone: [phone]  Fax: [phone]  Follow Up Time:     Mj Roque)  Gastroenterology; Internal Medicine  [address] [place] [address]  Phone: [phone]  Fax: [phone]  Follow Up Time:

## 2019-08-19 NOTE — ED ADULT NURSE NOTE - ISOLATION TYPE:
MEDICAL ATTENDING NOTE  Patient is a 39y old  Male who presents with a chief complaint of Left arm and leg cramps (28 Mar 2019 14:47)      HPI:  39 Male with PMH of Type 2 DM, Migraine, ESRD on HD for 3 yrs (left arm graft, TTS), Morbid obesity, HTN, Chr dyspnea (uses home O2 prn) presented to hospital for acute onset of Left arm and leg cramps started last night 12am. Pt has been having these upper extremity cramps for few months but this time it also involved his leg. Cramping is intermittent and is associated with severe pain. He is not able to unlock his Lt hand. According to patient, it doesn't happen during hospital dialysis when we take out less fluids as compared to outpatient clinic dialysis when they try to take double the amount of fluids. Tylenol and Gabapentin doesn't help. Pt is unsure why he is on dialysis and never got biopsy. He is constipated for 3 days. He recently started drinking alcohol due to stress in life. No fever, focal weakness, chest pain, new shortness of breath, abdominal pain. Despite being on dialysis, he still makes urine.     FH: Sister has RA and Lupus  SH: Smokes and uses other drugs often. Lives with family.     ED Course: Hemodynamically stable. Mildly hypertensive and tachycardic. Labs showed megaloblastic anemia and leukocytosis. Pt was given morphine, tylenol, valium for upper extremity pain. Pt was given 1.5 liter bolus. (25 Mar 2019 11:46)      INTERVAL HPI/OVERNIGHT EVENTS: no new complaints    MEDICATIONS  (STANDING):  amLODIPine   Tablet 5 milliGRAM(s) Oral daily  ascorbic acid 500 milliGRAM(s) Oral daily  chlorhexidine 2% Cloths 1 Application(s) Topical daily  clopidogrel Tablet 75 milliGRAM(s) Oral daily  epoetin cyndie Injectable 90348 Unit(s) IV Push <User Schedule>  furosemide    Tablet 40 milliGRAM(s) Oral daily  gabapentin 300 milliGRAM(s) Oral daily  heparin  Injectable 5000 Unit(s) SubCutaneous every 8 hours  insulin glargine Injectable (LANTUS) 10 Unit(s) SubCutaneous at bedtime  insulin lispro (HumaLOG) corrective regimen sliding scale   SubCutaneous Before meals and at bedtime  nicotine - 21 mG/24Hr(s) Patch 1 patch Transdermal daily  QUEtiapine 200 milliGRAM(s) Oral daily  simvastatin 40 milliGRAM(s) Oral at bedtime  vitamin E 400 International Unit(s) Oral at bedtime    MEDICATIONS  (PRN):  acetaminophen   Tablet .. 650 milliGRAM(s) Oral every 6 hours PRN Temp greater or equal to 38C (100.4F)  cyclobenzaprine 10 milliGRAM(s) Oral three times a day PRN Muscle Spasm  methocarbamol 1500 milliGRAM(s) Oral every 6 hours PRN Muscle Spasm      __________________________________________________  REVIEW OF SYSTEMS:    CONSTITUTIONAL: No fever,   EYES: no acute visual disturbances  NECK: No pain or stiffness  RESPIRATORY: No cough; No shortness of breath  CARDIOVASCULAR: No chest pain, no palpitations  GASTROINTESTINAL: No pain. No nausea or vomiting; No diarrhea   NEUROLOGICAL: No headache or numbness, no tremors  MUSCULOSKELETAL: No joint pain, no muscle pain  GENITOURINARY: no dysuria, no frequency, no hesitancy  PSYCHIATRY: no depression , no anxiety  ALL OTHER  ROS negative        Vital Signs Last 24 Hrs  T(C): 36.6 (28 Mar 2019 18:28), Max: 36.6 (28 Mar 2019 07:48)  T(F): 97.8 (28 Mar 2019 18:28), Max: 97.8 (28 Mar 2019 07:48)  HR: 95 (28 Mar 2019 18:28) (94 - 97)  BP: 152/86 (28 Mar 2019 18:28) (152/86 - 179/92)  BP(mean): --  RR: 20 (28 Mar 2019 18:28) (20 - 20)  SpO2: 96% (28 Mar 2019 18:28) (93% - 97%)    ________________________________________________  PHYSICAL EXAM:  GENERAL: NAD  HEENT: Normocephalic;  conjunctivae and sclerae clear; moist mucous membranes;   NECK : supple  CHEST/LUNG: Clear to auscultation bilaterally with good air entry   HEART: S1 S2  regular; no murmurs, gallops or rubs  ABDOMEN: Soft, Nontender, Nondistended; Bowel sounds present  EXTREMITIES: no cyanosis; no edema; no calf tenderness  SKIN: warm and dry; no rash  NERVOUS SYSTEM:  Awake and alert; Oriented  to place, person and time ; no new deficits    _________________________________________________  LABS:                        8.5    8.63  )-----------( 321      ( 27 Mar 2019 07:00 )             27.3     -28    134<L>  |  101  |  67<H>  ----------------------------<  104<H>  5.9<H>   |  26  |  11.80<H>    Ca    8.4      28 Mar 2019 14:13  Phos  9.5       Mg     2.6             Urinalysis Basic - ( 27 Mar 2019 07:07 )    Color: Yellow / Appearance: Clear / S.015 / pH: x  Gluc: x / Ketone: Negative  / Bili: Negative / Urobili: Negative   Blood: x / Protein: 500 mg/dL / Nitrite: Negative   Leuk Esterase: Negative / RBC: 2-5 /HPF / WBC 0-2 /HPF   Sq Epi: x / Non Sq Epi: x / Bacteria: x    CAPILLARY BLOOD GLUCOSE    POCT Blood Glucose.: 110 mg/dL (28 Mar 2019 17:14)  POCT Blood Glucose.: 144 mg/dL (28 Mar 2019 11:46)  POCT Blood Glucose.: 109 mg/dL (28 Mar 2019 08:24)  POCT Blood Glucose.: 164 mg/dL (27 Mar 2019 21:49) None

## 2019-08-19 NOTE — H&P ADULT - NSICDXPASTSURGICALHX_GEN_ALL_CORE_FT
PAST SURGICAL HISTORY:  H/O  section x2 ,     Hx of cholecystectomy     Hx of tonsillectomy as a child

## 2019-08-19 NOTE — DISCHARGE NOTE PROVIDER - CARE PROVIDERS DIRECT ADDRESSES
[email or web address]

## 2019-08-19 NOTE — CONSULT NOTE ADULT - SUBJECTIVE AND OBJECTIVE BOX
[facility] Division of Kidney Diseases & Hypertension  INITIAL CONSULT NOTE  [phone]--------------------------------------------------------------------------------  HPI: Pt is a 48yoF w/ nephrotic syndrome 2/2 membranous nephropathy, hepatitis B s/p treatment, HTN, ESRD on HD MWF (Nephrologist: Dr. Nunn, Center: [facility]), here for SOB. Patient states she woke up with shortness of breath. She also had a headache and decided to come to the hospital for further management. Patient was first diagnosed with membranous nephropathy (PLA2R negative) in  which was confirmed with kidney biopsy. Pt was started on HD in 10/2018. Last HD was Friday. Patient is dialyzed via left AVF.    She states that she believes she has not been having enough fluid removed during dialysis due to headaches and cramping and as such she has become fluid overloaded. When patient presented to the hospital she is hypertensive to the 200s, short of breath, requiring BiPAP. Patient without chest pain, abdominal pain, N/V/D/C.     Nephrology consulted as patient is ESRD on HD.      PAST HISTORY  --------------------------------------------------------------------------------  PAST MEDICAL & SURGICAL HISTORY:  GERD (gastroesophageal reflux disease)  Obesity, Class III, BMI 40-49.9 (morbid obesity)  Endometriosis  HTN (hypertension)  Dyslipidemia  Membranous Glomerulonephropathy  Chronic Hepatitis B  History of Nephrotic Syndrome  Hx of tonsillectomy: as a child  Hx of cholecystectomy  H/O  section x2: 2003    FAMILY HISTORY:  No pertinent family history in first degree relatives    PAST SOCIAL HISTORY:    ALLERGIES & MEDICATIONS  --------------------------------------------------------------------------------  Allergies    latex (Unknown)  penicillin (Unknown)    Intolerances      Standing Inpatient Medications  heparin  Injectable 5000 Unit(s) SubCutaneous every 8 hours  labetalol 300 milliGRAM(s) Oral every 12 hours    PRN Inpatient Medications      REVIEW OF SYSTEMS  --------------------------------------------------------------------------------  Gen: No  fevers/chills, weakness  Skin: No rashes  Head/Eyes/Ears/Mouth: Has headache.  Respiratory: Shortness of breath  CV: No chest pain,   GI: No abdominal pain, diarrhea, constipation, nausea, vomiting  : No increased frequency, dysuria, hematuria, nocturia  MSK: No joint pain/swelling;   Neuro: No dizziness/lightheadedness, weakness, seizures    All other systems were reviewed and are negative, except as noted.    VITALS/PHYSICAL EXAM  --------------------------------------------------------------------------------  T(C): 36.4 (19 @ 01:36), Max: 36.4 (19 @ 00:55)  HR: 79 (19 06:22) (68 - 81)  BP: 187/94 (19 06:22) (169/125 - 200/105)  RR: 16 (19 06:22) (15 - 24)  SpO2: 99% (19 06:22) (98% - 100%)  Wt(kg): --  Height (cm): 170.18 (19 00:55)  Weight (kg): 108.9 (19 00:55)  BMI (kg/m2): 37.6 (19 00:55)  BSA (m2): 2.19 (19 00:55)      Physical Exam:  	Gen: NAD, well-appearing  	HEENT: PERRL, supple neck  	Pulm: Diffuse crackles, diminished breath sounds.  	CV:  S1S2  	Abd: +BS, soft   	Ext: Trace edema of LE  	Neuro: No focal deficits  	Skin: Warm, without rashes  	Vascular: No cyanosis              Access: LUE AVF +thrill, +bruit    LABS/STUDIES  --------------------------------------------------------------------------------              11.3   11.3  >-----------<  276      [19 02:14]              34.0     135  |  98  |  72  ----------------------------<  100      [19 05:33]  5.3   |  19  |  9.69        Ca     10.2     [19 05:33]    TPro  7.0  /  Alb  4.2  /  TBili  0.4  /  DBili  x   /  AST  14  /  ALT  21  /  AlkPhos  42  [19 05:33]          Creatinine Trend:  SCr 9.69 [:33]  SCr 9.55 [ 02:14]

## 2019-08-19 NOTE — ED ADULT NURSE REASSESSMENT NOTE - NS ED NURSE REASSESS COMMENT FT1
as per nephrology MD Villegas, okay to hold off on administering potassium lowering agents as patients potassium is 5.3 and patient will be going for dialysis.

## 2019-08-19 NOTE — ED ADULT NURSE REASSESSMENT NOTE - NS ED NURSE REASSESS COMMENT FT1
Patient sitting in chair without Bipap mask on, CM or spo2 monitor. Patient states "I just cant be in bed all day". Patient educated that it is important that we continuously monitor spo2 and heart rate and rhythm. Patient also educated that Bipap is what is relieving her tachypnea and improving work of breathing. Patient verbalized understanding. Pt placed in position of comfort. Pt educated on call bell system and provided call bell. Bed in lowest position, wheels locked, appropriate side rails raised. Pt denies needs at this time.

## 2019-08-19 NOTE — ED ADULT NURSE NOTE - EENT WDL
Eyes with no visual disturbances.  Ears clean and dry and no hearing difficulties. Nose with pink mucosa and no drainage.  Mouth mucous membranes moist and pink.  No tenderness or swelling to throat or neck.
No

## 2019-08-19 NOTE — H&P ADULT - NSHPSOCIALHISTORY_GEN_ALL_CORE
To be completed by resident Patient smoked for 20 years, quit few years ago. One pack would last 3-4 days. No alcohol use or recreational drugs. Patient has 2 kids.

## 2019-08-19 NOTE — CONSULT NOTE ADULT - ASSESSMENT
ESRD on HD. Patient presents to the hospital with shortness of breath concerning for fluid overload.

## 2019-08-19 NOTE — H&P ADULT - PROBLEM SELECTOR PLAN 6
- patient on labetalol 300q12  - unclear what other meds she's on  - Pharmacy [phone], will need to verify meds

## 2019-08-19 NOTE — H&P ADULT - PROBLEM SELECTOR PLAN 4
- Patient currently being treated for hepatitis B  - f/u Hepatitis B surface antigen, antibody as well as hepatitis E  - continue to monitor  - f/u outpatient for hepatology - Patient currently being treated for hepatitis B  - f/u Hepatitis B surface antigen, antibody as well as hepatitis Be  - continue to monitor  - f/u outpatient for hepatology

## 2019-08-19 NOTE — H&P ADULT - HISTORY OF PRESENT ILLNESS
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 Roque), HTN presenting with shortness of breath that started at midnight. Patient denies any chest pain or palpitations. No nausea, vomiting, abdominal pain, or lower extremity edema. Patient continues to make urine. She had URI symptoms 2 weeks ago but now resolved. She was dialyzed 2 days ago at her regular scheduled session. Patient denies any fevers or chills. Of note, patient was admitted in Jan 2019 for similar symptoms of shortness of breath found to have pulmonary edema 2/2 to volume overload.     In the ED, patient was found to have T 97.5, HR 72, /109 (BP max 200/105), RR 22 satting 98% and then tachypneic with RR of 24. Patient was started on BiPAP and given 80 of IV lasix

## 2019-08-19 NOTE — PROGRESS NOTE ADULT - PROBLEM SELECTOR PLAN 4
- Patient currently being treated for hepatitis B  - f/u Hepatitis B surface antigen, antibody as well as hepatitis Be  - continue to monitor  - f/u outpatient for hepatology

## 2019-08-19 NOTE — PROGRESS NOTE ADULT - ATTENDING COMMENTS
-Patient seen/examined on 8/19/19. Case/plan discussed with the sub-intern and resident as reviewed/edited by me above and in any comments below.  -34 minutes spent on the discharge process.

## 2019-08-19 NOTE — ED PROVIDER NOTE - PHYSICAL EXAMINATION
Gen: WDWN, short shallow breaths satting 98%   HEENT: EOMI, no nasal discharge, mucous membranes moist  CV: RRR, no M/R/G  Resp: decreased BS + crackles at b/l lung bases  GI: Abdomen soft non-distended, NTTP, no masses  MSK: No open wounds, no bruising, no LE edema  Neuro: A&Ox4, following commands, moving all four extremities spontaneously  Psych: appropriate mood, denies AH, VH, SI

## 2019-08-19 NOTE — DISCHARGE NOTE NURSING/CASE MANAGEMENT/SOCIAL WORK - NSDCDPATPORTLINK_GEN_ALL_CORE
You can access the [facility] Patient Portal, offered by [facility], by registering with the following website: [email or web address]

## 2019-08-19 NOTE — H&P ADULT - PROBLEM SELECTOR PLAN 5
- Patient with K of 6.1 (hemolyzed on admission)  - monitor BMP to monitor for hyperkalemia  - repeat EKG to monitor for T wave changes

## 2019-08-19 NOTE — PROGRESS NOTE ADULT - ASSESSMENT
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

## 2019-08-19 NOTE — H&P ADULT - ASSESSMENT
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

## 2019-08-19 NOTE — PROGRESS NOTE ADULT - SUBJECTIVE AND OBJECTIVE BOX
The patient is a 49 year old woman with PMHx of ESRD due to nephrotic syndrome from membranoproliferative glomerulonephritis, active hepatitis B, HTN, and obesity who presented to the ED with acute SOB.    S: The patient states her breathing is much better since admission, she is off BIPAP and breathing comfortably on room air. She still has a headache that is diffuse in location. She states she started getting cramping in her legs after the dose of Lasix in the ED. She denies fever, chills, cough, chest pain, N/V, dysuria, weakness, or leg swelling.    O:  Vital Signs Last 24 Hrs  T(C): 36.4 (19 Aug 2019 01:36), Max: 36.4 (19 Aug 2019 00:55)  T(F): 97.5 (19 Aug 2019 01:36), Max: 97.5 (19 Aug 2019 00:55)  HR: 73 (19 Aug 2019 08:12) (68 - 81)  BP: 175/96 (19 Aug 2019 07:42) (169/125 - 200/105)  BP(mean): 137 (19 Aug 2019 05:00) (137 - 137)  RR: 16 (19 Aug 2019 07:42) (15 - 24)  SpO2: 100% (19 Aug 2019 08:12) (98% - 100%)    Physical Exam:                          11.2   11.36 )-----------( 263      ( 19 Aug 2019 08:08 )             34.3   08-19    135  |  98  |  72<H>  ----------------------------<  100<H>  5.3   |  19<L>  |  9.69<H>    Ca    10.2      19 Aug 2019 05:33    TPro  7.0  /  Alb  4.2  /  TBili  0.4  /  DBili  x   /  AST  14  /  ALT  21  /  AlkPhos  42  08-19    Troponin 28  Troponin 28  pro-BNP [id number] The patient is a 49 year old woman with PMHx of ESRD due to nephrotic syndrome from membranoproliferative glomerulonephritis, active hepatitis B, HTN, and obesity who presented to the ED with acute SOB.    S: The patient states her breathing is much better since admission, she is off BIPAP and breathing comfortably on room air. She still has a headache that is diffuse in location. She states she started getting cramping in her legs after the dose of Lasix in the ED. She denies fever, chills, cough, chest pain, N/V, dysuria, weakness, or leg swelling.    O:  Vital Signs Last 24 Hrs  T(C): 36.4 (19 Aug 2019 01:36), Max: 36.4 (19 Aug 2019 00:55)  T(F): 97.5 (19 Aug 2019 01:36), Max: 97.5 (19 Aug 2019 00:55)  HR: 73 (19 Aug 2019 08:12) (68 - 81)  BP: 175/96 (19 Aug 2019 07:42) (169/125 - 200/105)  BP(mean): 137 (19 Aug 2019 05:00) (137 - 137)  RR: 16 (19 Aug 2019 07:42) (15 - 24)  SpO2: 100% (19 Aug 2019 08:12) (98% - 100%)    Physical Exam:  General - receiving dialysis, appears uncomfortable due to headache, NAD, no longer on BIPAP and breathing comfortably  HEENT - atraumatic, normocephalic, no scleral icterus, supple neck with no neck swelling  Cardiac - RRR, +S1/S2  Respiratory - decreased breath sounds bilaterally  Abdomen - soft, nontender, nondistended  Extremities - bilateral lower extremity edema, no cyanosis, AVF in left upper extremity  Neuro - A&Ox3, no focal findings  Psych - mildly agitated due to headache and current dialysis treatment                          11.2   11.36 )-----------( 263      ( 19 Aug 2019 08:08 )             34.3   08-19    135  |  98  |  72<H>  ----------------------------<  100<H>  5.3   |  19<L>  |  9.69<H>    Ca    10.2      19 Aug 2019 05:33    TPro  7.0  /  Alb  4.2  /  TBili  0.4  /  DBili  x   /  AST  14  /  ALT  21  /  AlkPhos  42  08-19    Troponin 28  Troponin 28  pro-BNP [id number]    Chest X-ray: Impression - clear lungs

## 2019-08-19 NOTE — ED ADULT NURSE NOTE - ED STAT RN HANDOFF DETAILS
report to [facility] RN. Ok to transport to floor off tele and off bi-pap as per MD West, Pt refusing Bi-pap, admission team paged. Pt transported with Bi-pap machine and respiratory.

## 2019-08-19 NOTE — ED PROVIDER NOTE - ATTENDING CONTRIBUTION TO CARE
49F presenting to the ED w/ shortness of breath x a few hours, states that she woke up with it, reports requiring more pillows at night. States that she last got dialyzed Friday, felt well afterwards. Patient reports feels like prior episodes of fluid overload. Shortness of breath exacerbated by lying flat, alleviated by sitting up. States she has been taking her Lasix/Torsemide.    ROS:  GEN: (-) fevers/chills, (-) weight loss, (-) fatigue/malaise  HEENT: (-) visual change  NECK: (-) stiffness  RESP: (+) shortness of breath, (+) cough, (-) sputum  CV: (-) chest pain, (-) palpitations  GI: (-) nausea, (-) vomiting, (-) pain, (-) constipation  : (-) frequency/urgency, (-) hematuria, (-) dysuria, (-) incontinence, (-) discharge  EXT: (+) edema, (-) cyanosis  ENDO: (-) heat/cold intolerance  NEURO: (-) paresthesias, (-) weakness, (+) headache, (-) dizziness, (-) syncope  MSK: no recent trauma    PMHx: HBV, DLD, GERD, Nephrotic syndrome, ESRD on HD M/W/F, Obesity  PSHx: , cholecystectomy, tonsillectomy  Allergies: See EMR  SocHx: Denies ETOH/drugs/smoking    PE:  VITALS REVIEWED.  Hypertensive, tachypneic RR 31, 98% on 2L NC  GENERALIZED APPEARANCE: Uncomfortable, tachypneic, speaking 3-4 word sentences  SKIN:  Warm, dry, no cyanosis  HEAD:  No obvious scalp lesions  EYES:  Conjunctiva pink, no icterus  ENMT:  Mucus membranes moist, no stridor  NECK:  Supple  CHEST AND RESPIRATORY:  Crackles at bases, +retractions, no rales  HEART AND CARDIOVASCULAR:  Regular rate, no obvious murmur  ABDOMEN AND GI:  Soft, non-tender, non-distended.  No rebound, no guarding  EXTREMITIES:  No deformity or calf tenderness, (+) 1 pitting edema B/L  NEURO: AAOx3, gross motor and sensory intact    Impression:  CHF exacerbation, hypertensive emergency, r/o ACS, r/o PNA, r/o hyperkalemia/electrolyte abnormality  Labs, imaging, BiPAP, BP control, admit to telemetry, patient states that nitroglycerin gives her extreme headache so she does not want it although explained benefits to BP/fluid removal.

## 2019-08-20 ENCOUNTER — INBOUND DOCUMENT (OUTPATIENT)
Age: 49
End: 2019-08-20

## 2019-08-20 LAB
HBV DNA # SERPL NAA+PROBE: SIGNIFICANT CHANGE UP IU/ML
HBV DNA SERPL NAA+PROBE-LOG#: SIGNIFICANT CHANGE UP LOGIU/ML

## 2019-08-21 LAB — HEV AB FLD QL: NEGATIVE — SIGNIFICANT CHANGE UP

## 2019-08-25 LAB — HEV IGM SER QL: SIGNIFICANT CHANGE UP

## 2019-09-13 ENCOUNTER — RX RENEWAL (OUTPATIENT)
Age: 49
End: 2019-09-13

## 2019-10-08 ENCOUNTER — APPOINTMENT (OUTPATIENT)
Dept: VASCULAR SURGERY | Facility: CLINIC | Age: 49
End: 2019-10-08
Payer: COMMERCIAL

## 2019-10-08 PROCEDURE — 93990 DOPPLER FLOW TESTING: CPT

## 2019-10-08 PROCEDURE — 99213 OFFICE O/P EST LOW 20 MIN: CPT

## 2019-10-08 NOTE — PHYSICAL EXAM
[Thrill] : thrill [Aneurysm] : no aneurysm [Pulsatile Thrill] : no pulsatile thrill [Hand well perfused] : hand well perfused [Bleeding] : no bleeding [Ulcer] : no ulcer [Gangrene] : no gangrene [2+] : left 2+ [Normal] : coordination grossly intact, no focal deficits [de-identified] : intact

## 2019-10-08 NOTE — DISCUSSION/SUMMARY
[FreeTextEntry1] : 50 yo female with history of esrd on hd via left upper extremity avf presents for follow up.\par duplex shows 2 areas of 50-75% stenosis with flow rate of 2254 and good thrill on exam \par

## 2019-10-08 NOTE — HISTORY OF PRESENT ILLNESS
[FreeTextEntry1] : 47 yo female with history of esrd on hd via left upper extremity avf presents for follow up.  pt reports occasional difficulty with cannulation at the proximal upper extremity  pt states that there are no issues if they access closer to the ac \par pt denies any bleeding after hd  [] : left brachiocephalic fistula

## 2019-10-21 DIAGNOSIS — R11.0 NAUSEA: ICD-10-CM

## 2019-10-22 ENCOUNTER — RX CHANGE (OUTPATIENT)
Age: 49
End: 2019-10-22

## 2019-10-28 NOTE — ED PROVIDER NOTE - NS ED MD DISPO DISCHARGE
Home
Plan: Discussed possible antibiotics if not improved by f/u.
Detail Level: Zone
Initiate Treatment: Epiduo Forte 0.3 %-2.5 % topical gel with pump \\nDays Supply: 30\\nSig: apply pea sized amount to acne at bedtime

## 2019-11-04 ENCOUNTER — RX RENEWAL (OUTPATIENT)
Age: 49
End: 2019-11-04

## 2019-11-04 RX ORDER — SEVELAMER CARBONATE 800 MG/1
0.8 POWDER, FOR SUSPENSION ORAL 3 TIMES DAILY
Qty: 21 | Refills: 0 | Status: DISCONTINUED | COMMUNITY
Start: 2019-10-28 | End: 2019-11-04

## 2019-12-02 ENCOUNTER — RX RENEWAL (OUTPATIENT)
Age: 49
End: 2019-12-02

## 2019-12-02 DIAGNOSIS — M79.604 PAIN IN RIGHT LEG: ICD-10-CM

## 2019-12-02 DIAGNOSIS — M79.605 PAIN IN RIGHT LEG: ICD-10-CM

## 2020-01-07 ENCOUNTER — APPOINTMENT (OUTPATIENT)
Dept: VASCULAR SURGERY | Facility: CLINIC | Age: 50
End: 2020-01-07
Payer: COMMERCIAL

## 2020-01-07 PROCEDURE — 93990 DOPPLER FLOW TESTING: CPT

## 2020-01-07 PROCEDURE — 99213 OFFICE O/P EST LOW 20 MIN: CPT

## 2020-01-07 RX ORDER — ACETAMINOPHEN AND CODEINE 300; 30 MG/1; MG/1
300-30 TABLET ORAL 3 TIMES DAILY
Qty: 60 | Refills: 0 | Status: COMPLETED | COMMUNITY
Start: 2019-04-08 | End: 2020-01-07

## 2020-01-07 NOTE — DISCUSSION/SUMMARY
[FreeTextEntry1] : 50 yo female with history of esrd on hd via left upper extremity avf presents for follow up.\par duplex shows 50-75% stenosis of the distal upper arm \par will continue to monitor given no issues with hd \par pt to follow up in 3 months with repeat duplex\par

## 2020-01-07 NOTE — HISTORY OF PRESENT ILLNESS
[] : left brachiocephalic fistula [FreeTextEntry1] : 50 yo female with history of esrd on hd via left upper extremity avf presents for follow up.  pt denies any difficulty with avf during hd.  pt denies persistent prolonged bleeding after hd \par pt reports occasional shoulder pain and increase in size of aneurysmal areas of the avf\par pt denies any bleeding after hd

## 2020-01-07 NOTE — PHYSICAL EXAM
[Hand well perfused] : hand well perfused [Thrill] : thrill [Normal] : coordination grossly intact, no focal deficits [2+] : left 2+ [Ulcer] : no ulcer [de-identified] : intact [de-identified] :  strength 5/5 b/l upper extremities

## 2020-01-23 NOTE — ED ADULT TRIAGE NOTE - SOURCE OF INFORMATION
Staffing note:    Vero Harvey, APSW  1/23/2020 01/23/20 0914   Patient/Team Interactions   Interaction Type Team meeting   Team Members Participating Case Management;Nursing;Psychiatrist;Psychotherapist   30 Day Readmission Reason  Adherence issues   Identified Barriers to Discharge/Transitions Coping Deficts - patient   Identified Barriers to Next Level of Care No barriers   Housing Issues Other (comment)  (homeless)   Inpatient Group Session Attendance Yes   Appropriate in Group Activity Yes   Does Patient Have Community Case Mgmt Services No   Patient Needs Referral for Community Case Mgmt No   Next Level of Care Recommended OP   Patient will be picked up by: Unknown at this time   Recommendations Continue current plan   Appointment Within 7 Days of Discharge/Made Prior to Discharge No (comment)  (Unknown at this time, dependent on shelter location)   Significant Events Pt remains inpatient voluntarily on day 13, was considering transition to [facility] in [place] although expressed concern about starting over. Pt shares he is far too symptomatic to transition to outpatient and requests [facility] which is not available to him. Writer encouraged pt to contact  regarding alternative options and discussed benefits of outpatient programming for continued wellness. Pt shares thoughts to shoot innocent people because he can't get the care he needs. 'I'm worth more dead than alive.\" Continue to stabilize.       Patient

## 2020-03-06 ENCOUNTER — APPOINTMENT (OUTPATIENT)
Dept: HEPATOLOGY | Facility: CLINIC | Age: 50
End: 2020-03-06

## 2020-03-12 ENCOUNTER — RX RENEWAL (OUTPATIENT)
Age: 50
End: 2020-03-12

## 2020-04-02 ENCOUNTER — APPOINTMENT (OUTPATIENT)
Dept: VASCULAR SURGERY | Facility: CLINIC | Age: 50
End: 2020-04-02

## 2020-05-11 DIAGNOSIS — B35.1 TINEA UNGUIUM: ICD-10-CM

## 2020-05-12 ENCOUNTER — APPOINTMENT (OUTPATIENT)
Dept: VASCULAR SURGERY | Facility: CLINIC | Age: 50
End: 2020-05-12
Payer: COMMERCIAL

## 2020-05-12 PROCEDURE — 93990 DOPPLER FLOW TESTING: CPT

## 2020-05-12 PROCEDURE — 99213 OFFICE O/P EST LOW 20 MIN: CPT

## 2020-05-12 NOTE — HISTORY OF PRESENT ILLNESS
[] : left brachiocephalic fistula [FreeTextEntry1] : 48 yo female with history of esrd on hd via left upper extremity avf presents for follow up.  pt reports occasional difficulty with cannulation at the proximal upper extremity  pt states that there are no issues if they access closer to the ac \par pt denies any bleeding after hd

## 2020-05-12 NOTE — PHYSICAL EXAM
[Thrill] : thrill [Pulsatile Thrill] : no pulsatile thrill [Aneurysm] : no aneurysm [Bleeding] : no bleeding [Hand well perfused] : hand well perfused [Ulcer] : no ulcer [Gangrene] : no gangrene [2+] : left 2+ [Normal] : coordination grossly intact, no focal deficits [de-identified] : intact

## 2020-05-12 NOTE — DISCUSSION/SUMMARY
[FreeTextEntry1] : 48 yo female with history of esrd on hd via left upper extremity avf presents for follow up.\par continued pain\par for fistulogram\par

## 2020-05-19 ENCOUNTER — APPOINTMENT (OUTPATIENT)
Dept: VASCULAR SURGERY | Facility: CLINIC | Age: 50
End: 2020-05-19

## 2020-05-21 ENCOUNTER — FORM ENCOUNTER (OUTPATIENT)
Age: 50
End: 2020-05-21

## 2020-05-21 PROBLEM — T82.898A INADEQUATE FLOW OF DIALYSIS ARTERIOVENOUS FISTULA: Status: ACTIVE | Noted: 2019-02-15

## 2020-05-22 ENCOUNTER — APPOINTMENT (OUTPATIENT)
Dept: ENDOVASCULAR SURGERY | Facility: CLINIC | Age: 50
End: 2020-05-22
Payer: COMMERCIAL

## 2020-05-22 VITALS
DIASTOLIC BLOOD PRESSURE: 106 MMHG | RESPIRATION RATE: 15 BRPM | OXYGEN SATURATION: 98 % | BODY MASS INDEX: 36.68 KG/M2 | SYSTOLIC BLOOD PRESSURE: 188 MMHG | HEART RATE: 68 BPM | WEIGHT: 233.69 LBS | TEMPERATURE: 97.7 F | HEIGHT: 67 IN

## 2020-05-22 DIAGNOSIS — T82.898A OTHER SPECIFIED COMPLICATION OF VASCULAR PROSTHETIC DEVICES, IMPLANTS AND GRAFTS, INITIAL ENCOUNTER: ICD-10-CM

## 2020-05-22 PROCEDURE — 36902Z: CUSTOM

## 2020-05-27 NOTE — HISTORY OF PRESENT ILLNESS
[] : left brachiocephalic fistula [FreeTextEntry1] : 10/17/18/ Dr Cunningham [FreeTextEntry4] : today [FreeTextEntry5] : yesterday 8pm [FreeTextEntry6] : Dr Pineda

## 2020-08-01 NOTE — ED ADULT NURSE NOTE - GASTROINTESTINAL WDL
Abdomen soft, nontender, nondistended, bowel sounds present in all 4 quadrants.
follows commands/alert

## 2020-08-25 ENCOUNTER — APPOINTMENT (OUTPATIENT)
Dept: VASCULAR SURGERY | Facility: CLINIC | Age: 50
End: 2020-08-25
Payer: COMMERCIAL

## 2020-08-25 VITALS
BODY MASS INDEX: 36.57 KG/M2 | SYSTOLIC BLOOD PRESSURE: 121 MMHG | HEIGHT: 67 IN | HEART RATE: 70 BPM | WEIGHT: 233 LBS | DIASTOLIC BLOOD PRESSURE: 79 MMHG

## 2020-08-25 PROCEDURE — 93990 DOPPLER FLOW TESTING: CPT

## 2020-08-25 PROCEDURE — 99213 OFFICE O/P EST LOW 20 MIN: CPT

## 2020-08-25 NOTE — PHYSICAL EXAM
[Thrill] : thrill [Pulsatile Thrill] : pulsatile thrill [Hand well perfused] : hand well perfused [2+] : right 2+ [1+] : left 1+ [Normal] : coordination grossly intact, no focal deficits [Bleeding] : no bleeding [Ulcer] : no ulcer [de-identified] : intact [de-identified] :  strength 5/5 b/l upper extremities

## 2020-08-25 NOTE — HISTORY OF PRESENT ILLNESS
[] : left brachiocephalic fistula [FreeTextEntry1] : 49 yo female with history of esrd on hd via left upper extremity avf presents for follow up.  pt s/p recent kidney transplant about 3 months ago no longer on hd.  pt with occasional pain around the avf and some weakness of the hand that has been present since the fistula was placed

## 2020-08-25 NOTE — DISCUSSION/SUMMARY
[FreeTextEntry1] : 49 yo female with history of esrd on hd via left upper extremity avf presents for follow up s/p recent kidney transplant \par duplex shows patent avf with 50-75% stenosis of the cephalic subclavian junction and mid upper arm with flow rate of 3158\par \par at this time will continue to monitor \par pt to follow up in 3 months at that time will further discuss possible avf ligation

## 2020-08-27 ENCOUNTER — APPOINTMENT (OUTPATIENT)
Dept: HEPATOLOGY | Facility: CLINIC | Age: 50
End: 2020-08-27
Payer: COMMERCIAL

## 2020-08-27 VITALS
TEMPERATURE: 97.2 F | WEIGHT: 214 LBS | BODY MASS INDEX: 33.59 KG/M2 | HEART RATE: 67 BPM | SYSTOLIC BLOOD PRESSURE: 133 MMHG | HEIGHT: 67 IN | DIASTOLIC BLOOD PRESSURE: 84 MMHG | RESPIRATION RATE: 15 BRPM

## 2020-08-27 PROCEDURE — 99214 OFFICE O/P EST MOD 30 MIN: CPT

## 2020-08-27 RX ORDER — CANDESARTAN CILEXETIL 16 MG/1
16 TABLET ORAL DAILY
Qty: 30 | Refills: 2 | Status: DISCONTINUED | COMMUNITY
Start: 2019-05-07 | End: 2020-08-27

## 2020-08-27 RX ORDER — TORSEMIDE 20 MG/1
20 TABLET ORAL DAILY
Qty: 30 | Refills: 3 | Status: DISCONTINUED | COMMUNITY
Start: 2019-02-05 | End: 2020-08-27

## 2020-08-27 RX ORDER — LIDOCAINE AND PRILOCAINE 25; 25 MG/G; MG/G
2.5-2.5 CREAM TOPICAL
Qty: 120 | Refills: 2 | Status: DISCONTINUED | COMMUNITY
Start: 2018-11-12 | End: 2020-08-27

## 2020-08-27 RX ORDER — MYCOPHENILIC ACID 360 MG/1
360 TABLET, DELAYED RELEASE ORAL
Refills: 0 | Status: ACTIVE | COMMUNITY
Start: 2020-08-27

## 2020-08-27 RX ORDER — FOLIC ACID/VIT B COMPLEX AND C 0.8 MG
0.8 TABLET ORAL
Qty: 90 | Refills: 3 | Status: DISCONTINUED | COMMUNITY
Start: 2020-02-19 | End: 2020-08-27

## 2020-08-27 RX ORDER — ENTECAVIR 0.5 MG/1
0.5 TABLET, FILM COATED ORAL
Qty: 32 | Refills: 1 | Status: DISCONTINUED | COMMUNITY
Start: 2019-04-08 | End: 2020-08-27

## 2020-08-27 RX ORDER — SODIUM SULFATE, POTASSIUM SULFATE, MAGNESIUM SULFATE 17.5; 3.13; 1.6 G/ML; G/ML; G/ML
17.5-3.13-1.6 SOLUTION, CONCENTRATE ORAL
Qty: 1 | Refills: 0 | Status: DISCONTINUED | COMMUNITY
Start: 2019-03-20 | End: 2020-08-27

## 2020-08-27 RX ORDER — PREDNISONE 20 MG/1
20 TABLET ORAL DAILY
Qty: 60 | Refills: 3 | Status: DISCONTINUED | COMMUNITY
Start: 2020-03-30 | End: 2020-08-27

## 2020-08-27 RX ORDER — ONDANSETRON 4 MG/1
4 TABLET ORAL 3 TIMES DAILY
Qty: 90 | Refills: 0 | Status: DISCONTINUED | COMMUNITY
Start: 2019-10-21 | End: 2020-08-27

## 2020-08-27 RX ORDER — EFINACONAZOLE 100 MG/ML
10 SOLUTION TOPICAL
Qty: 40 | Refills: 2 | Status: DISCONTINUED | COMMUNITY
Start: 2020-05-11 | End: 2020-08-27

## 2020-08-27 RX ORDER — LACTULOSE 10 G/15ML
20 SOLUTION ORAL DAILY
Qty: 4 | Refills: 0 | Status: DISCONTINUED | COMMUNITY
Start: 2020-03-02 | End: 2020-08-27

## 2020-08-27 RX ORDER — SENNOSIDES 8.6 MG TABLETS 8.6 MG/1
8.6 TABLET ORAL
Qty: 180 | Refills: 2 | Status: DISCONTINUED | COMMUNITY
Start: 2020-03-02 | End: 2020-08-27

## 2020-08-27 RX ORDER — ONDANSETRON 4 MG/1
4 TABLET ORAL EVERY 8 HOURS
Qty: 180 | Refills: 0 | Status: DISCONTINUED | COMMUNITY
Start: 2019-10-22 | End: 2020-08-27

## 2020-08-27 RX ORDER — GABAPENTIN 100 MG/1
100 CAPSULE ORAL
Qty: 30 | Refills: 2 | Status: DISCONTINUED | COMMUNITY
Start: 2019-12-02 | End: 2020-08-27

## 2020-08-27 RX ORDER — ETHYL CHLORIDE 100 %
AEROSOL, SPRAY (ML) TOPICAL
Qty: 5 | Refills: 4 | Status: DISCONTINUED | COMMUNITY
Start: 2019-05-17 | End: 2020-08-27

## 2020-08-27 RX ORDER — LABETALOL HYDROCHLORIDE 300 MG/1
300 TABLET, FILM COATED ORAL
Qty: 180 | Refills: 1 | Status: DISCONTINUED | COMMUNITY
Start: 2018-10-24 | End: 2020-08-27

## 2020-08-27 RX ORDER — TERBINAFINE HYDROCHLORIDE 250 MG/1
250 TABLET ORAL DAILY
Qty: 90 | Refills: 0 | Status: DISCONTINUED | COMMUNITY
Start: 2020-05-18 | End: 2020-08-27

## 2020-08-27 RX ORDER — SODIUM BICARBONATE 650 MG/1
650 TABLET ORAL 3 TIMES DAILY
Qty: 90 | Refills: 3 | Status: DISCONTINUED | COMMUNITY
Start: 2018-10-24 | End: 2020-08-27

## 2020-08-27 RX ORDER — CANDESARTAN CILEXETIL 16 MG/1
16 TABLET ORAL DAILY
Qty: 30 | Refills: 3 | Status: DISCONTINUED | COMMUNITY
Start: 2020-03-12 | End: 2020-08-27

## 2020-08-27 RX ORDER — SEVELAMER CARBONATE 800 MG/1
800 TABLET, FILM COATED ORAL
Qty: 240 | Refills: 4 | Status: DISCONTINUED | COMMUNITY
Start: 2018-10-24 | End: 2020-08-27

## 2020-08-28 LAB
HBV CORE IGG+IGM SER QL: REACTIVE
HBV SURFACE AB SER QL: NONREACTIVE
HBV SURFACE AG SER QL: REACTIVE

## 2020-08-31 LAB
HBV DNA # SERPL NAA+PROBE: NOT DETECTED IU/ML
HEPB DNA PCR LOG: NOT DETECTED LOG10IU/ML

## 2020-09-02 LAB
HBV E AB SER QL: POSITIVE
HBV E AG SER QL: NEGATIVE

## 2020-09-30 ENCOUNTER — EMERGENCY (EMERGENCY)
Facility: HOSPITAL | Age: 50
LOS: 1 days | Discharge: HOME | End: 2020-09-30
Attending: EMERGENCY MEDICINE
Payer: COMMERCIAL

## 2020-09-30 VITALS
OXYGEN SATURATION: 100 % | TEMPERATURE: 98 F | HEART RATE: 74 BPM | RESPIRATION RATE: 22 BRPM | SYSTOLIC BLOOD PRESSURE: 128 MMHG | DIASTOLIC BLOOD PRESSURE: 77 MMHG

## 2020-09-30 VITALS
HEIGHT: 67 IN | RESPIRATION RATE: 20 BRPM | DIASTOLIC BLOOD PRESSURE: 86 MMHG | HEART RATE: 73 BPM | TEMPERATURE: 98 F | SYSTOLIC BLOOD PRESSURE: 144 MMHG | OXYGEN SATURATION: 98 % | WEIGHT: 210.98 LBS

## 2020-09-30 LAB
ALBUMIN SERPL ELPH-MCNC: 4.6 G/DL — SIGNIFICANT CHANGE UP (ref 3.3–5)
ALP SERPL-CCNC: 132 U/L — HIGH (ref 40–120)
ALT FLD-CCNC: 17 U/L — SIGNIFICANT CHANGE UP (ref 10–45)
ANION GAP SERPL CALC-SCNC: 13 MMOL/L — SIGNIFICANT CHANGE UP (ref 5–17)
APTT BLD: 33.6 SEC — SIGNIFICANT CHANGE UP (ref 27.5–35.5)
AST SERPL-CCNC: 16 U/L — SIGNIFICANT CHANGE UP (ref 10–40)
BILIRUB SERPL-MCNC: 0.5 MG/DL — SIGNIFICANT CHANGE UP (ref 0.2–1.2)
BUN SERPL-MCNC: 13 MG/DL — SIGNIFICANT CHANGE UP (ref 7–23)
CALCIUM SERPL-MCNC: 10.3 MG/DL — SIGNIFICANT CHANGE UP (ref 8.4–10.5)
CHLORIDE SERPL-SCNC: 104 MMOL/L — SIGNIFICANT CHANGE UP (ref 96–108)
CO2 SERPL-SCNC: 17 MMOL/L — LOW (ref 22–31)
CREAT SERPL-MCNC: 1.24 MG/DL — SIGNIFICANT CHANGE UP (ref 0.5–1.3)
GLUCOSE SERPL-MCNC: 113 MG/DL — HIGH (ref 70–99)
HCG SERPL-ACNC: <2 MIU/ML — SIGNIFICANT CHANGE UP
HCT VFR BLD CALC: 47.1 % — HIGH (ref 34.5–45)
HGB BLD-MCNC: 15 G/DL — SIGNIFICANT CHANGE UP (ref 11.5–15.5)
INR BLD: 1.04 RATIO — SIGNIFICANT CHANGE UP (ref 0.88–1.16)
MCHC RBC-ENTMCNC: 30.1 PG — SIGNIFICANT CHANGE UP (ref 27–34)
MCHC RBC-ENTMCNC: 31.8 GM/DL — LOW (ref 32–36)
MCV RBC AUTO: 94.6 FL — SIGNIFICANT CHANGE UP (ref 80–100)
NRBC # BLD: 0 /100 WBCS — SIGNIFICANT CHANGE UP (ref 0–0)
PLATELET # BLD AUTO: 219 K/UL — SIGNIFICANT CHANGE UP (ref 150–400)
POTASSIUM SERPL-MCNC: 4.2 MMOL/L — SIGNIFICANT CHANGE UP (ref 3.5–5.3)
POTASSIUM SERPL-SCNC: 4.2 MMOL/L — SIGNIFICANT CHANGE UP (ref 3.5–5.3)
PROT SERPL-MCNC: 7.4 G/DL — SIGNIFICANT CHANGE UP (ref 6–8.3)
PROTHROM AB SERPL-ACNC: 12.4 SEC — SIGNIFICANT CHANGE UP (ref 10.6–13.6)
RBC # BLD: 4.98 M/UL — SIGNIFICANT CHANGE UP (ref 3.8–5.2)
RBC # FLD: 12.9 % — SIGNIFICANT CHANGE UP (ref 10.3–14.5)
SODIUM SERPL-SCNC: 134 MMOL/L — LOW (ref 135–145)
WBC # BLD: 7.65 K/UL — SIGNIFICANT CHANGE UP (ref 3.8–10.5)
WBC # FLD AUTO: 7.65 K/UL — SIGNIFICANT CHANGE UP (ref 3.8–10.5)

## 2020-09-30 PROCEDURE — 85027 COMPLETE CBC AUTOMATED: CPT

## 2020-09-30 PROCEDURE — 85730 THROMBOPLASTIN TIME PARTIAL: CPT

## 2020-09-30 PROCEDURE — 99284 EMERGENCY DEPT VISIT MOD MDM: CPT

## 2020-09-30 PROCEDURE — 73080 X-RAY EXAM OF ELBOW: CPT

## 2020-09-30 PROCEDURE — 85610 PROTHROMBIN TIME: CPT

## 2020-09-30 PROCEDURE — 73030 X-RAY EXAM OF SHOULDER: CPT | Mod: 26,LT

## 2020-09-30 PROCEDURE — 73060 X-RAY EXAM OF HUMERUS: CPT

## 2020-09-30 PROCEDURE — 84702 CHORIONIC GONADOTROPIN TEST: CPT

## 2020-09-30 PROCEDURE — 73020 X-RAY EXAM OF SHOULDER: CPT

## 2020-09-30 PROCEDURE — 73030 X-RAY EXAM OF SHOULDER: CPT

## 2020-09-30 PROCEDURE — 73080 X-RAY EXAM OF ELBOW: CPT | Mod: 26,LT

## 2020-09-30 PROCEDURE — 73060 X-RAY EXAM OF HUMERUS: CPT | Mod: 26,LT

## 2020-09-30 PROCEDURE — 80053 COMPREHEN METABOLIC PANEL: CPT

## 2020-09-30 RX ORDER — ACETAMINOPHEN 500 MG
975 TABLET ORAL ONCE
Refills: 0 | Status: COMPLETED | OUTPATIENT
Start: 2020-09-30 | End: 2020-09-30

## 2020-09-30 RX ADMIN — Medication 975 MILLIGRAM(S): at 22:41

## 2020-09-30 NOTE — ED PROVIDER NOTE - NSFOLLOWUPCLINICS_GEN_ALL_ED_FT
[facility]  .  [place]   Phone: [phone]  Fax:   Follow Up Time:

## 2020-09-30 NOTE — ED PROVIDER NOTE - PHYSICAL EXAMINATION
MICHAEL Paulino PA-C see below:  GEN: NAD, awake, eyes open spontaneously  HEENT: NCAT, MMM, Trachea midline, normal conjunctiva, perrl  CHEST/LUNGS: Non-tachypneic, CTAB, bilateral breath sounds  CARDIAC: Non-tachycardic, normal perfusion  ABDOMEN: Soft, NTND, No rebound/guarding  MSK: +TTP left shoulder/left elbow, pain with passive/active ROM of left shoulder/elbow. Mild edema of upper arm/forearm/fistula site  SKIN: No rashes, no petechiae, no vesicles  NEURO: CN grossly intact, normal coordination, no focal motor or sensory deficits  PSYCH: Alert, appropriate, cooperative, with capacity and insight   ------------------------------------------------------------------------------------- MICHAEL Paulino PA-C see below:  GEN: NAD, awake, eyes open spontaneously  HEENT: NCAT, MMM, Trachea midline, normal conjunctiva, perrl  CHEST/LUNGS: Non-tachypneic, CTAB, bilateral breath sounds  CARDIAC: Non-tachycardic, normal perfusion  ABDOMEN: Soft, NTND, No rebound/guarding  MSK: +TTP left shoulder/left elbow, pain with passive/active ROM of left shoulder/elbow.   SKIN: No rashes, no petechiae, no vesicles  NEURO: CN grossly intact, normal coordination, no focal motor or sensory deficits  PSYCH: Alert, appropriate, cooperative, with capacity and insight   -------------------------------------------------------------------------------------

## 2020-09-30 NOTE — ED PROVIDER NOTE - OBJECTIVE STATEMENT
50y F PMH ESRD due to nephrotic syndrome due to membranoproliferative glomerulonephritis with Kidney Transplant, hepatitis B infection p/w left shoulder pain s/p fall today. Pt was walking in her kitchen tonight when she had a mechanical fall by tripping over self and fell directly on her left shoulder. Pt c/o pain at left shoulder/left elbow and fistula site of left upper arm. Pt also endorses tingling/swelling of forearm and swelling of upper arm. Pt denies fever/chills, HA/visual changes/LOC, chest pain/SOB, abdominal pain, N/V/D.

## 2020-09-30 NOTE — ED PROVIDER NOTE - CLINICAL SUMMARY MEDICAL DECISION MAKING FREE TEXT BOX
L arm/shoulder pain after mechanical fall from standing height, a/w subjective paresthesias but normal sensation, motor; L AC/upper arm fistula evaluated, no ecchymosis or TTP noted, +thrill +palp.  Distal vascular exam is WNL.  Doubt injury to graft itself, seems more c/w RTC injury -- doubt prox hum fx or gh joint dislocation.  AC joint is nontender but could be .  Will check labs, XRs, consider vascular c/s given direct injury to graft though at this time patient has no e/o hemorrhage or vascular compromise to LUE.  Reassess.  --BMM

## 2020-09-30 NOTE — ED ADULT TRIAGE NOTE - CHIEF COMPLAINT QUOTE
mechanical fall c/o left shoulder/arm pain. Patient states having left fistula and landing on fistula. Patient endorses tingling to the left arm, no obvious signs of bleeding.   +fistula bruit noted in triage.

## 2020-09-30 NOTE — ED PROVIDER NOTE - CARE PLAN
Principal Discharge DX:	Shoulder pain   Principal Discharge DX:	Shoulder strain, left, initial encounter

## 2020-09-30 NOTE — ED ADULT NURSE NOTE - OBJECTIVE STATEMENT
50y F PMHx hep B, HLD, HTN, GERD, membranous glomerulonephropathy s/p kidney transplant presents to ED from home c/o Lt shoulder pain s/p mechanical fall. Pt states that she was walking in the kitchen when she tripped & fell, denies LOC or dizziness/syncope at time of fall. Upon arrival, pt crying & expressing concern about damaging Lt AVF. Denies CP, SOB, H/A, N/V/D, abdominal pain, f/c, dizziness, & urinary symptoms. A&Ox4. No respiratory distress noted on RA. Abdomen soft, nondistended, & nontender to palpation. Skin warm, dry, & intact. MAEx4 but Lt shoulder painful with movement. Full ROM at elbow & no numbness/tingling noted to BUEs. +Lt AVF with palpable thrill & no external signs of damage. No LE edema noted on exam. Pt resting comfortably with no complaints at this time. Pt's bed in the lowest position & explained POC to pt & family members. Will continue to reassess.

## 2020-09-30 NOTE — ED PROVIDER NOTE - PROGRESS NOTE DETAILS
Improved pain and subjective paresthesias.  No change to soft tissue exam surrounding fistula -- still with no swelling, ecchymosis, +thrill +palp pulse +normal neurovascular exam distal RUE.  XR elbow noted to have ?effusion? and small osteophyte; there is no associated swelling or tenderness to palpation at elbow joint, able to flex/ext/supinate/pronate painlessly.  Pt aware of Cr results, made aware of abnormal alk phos, recommended f/u c PCP to recheck.  Noted to have bicarb 17; uncertain significance as she appears clinically nontoxic, vss, no e/o LUE ischemia or hemorrhage.  Does not require further w/u.  Patient was given strict return precautions and ortho referral.  --BMM

## 2020-09-30 NOTE — ED PROVIDER NOTE - NSFOLLOWUPINSTRUCTIONS_ED_ALL_ED_FT
You were seen in the Emergency Room for left shoulder/elbow pain after a fall. All labs/Xrays rule out any emergent findings or fractures.  Follow-up with your primary care provider in 1-2 days.  Follow up with your Nephrologist in the next 1-2 days.   Continue to take all medications as prescribed.  Rest and drink plenty of fluids. Pain can be managed with Acetaminophen (aka Tylenol) over the counter as directed.  Return to the ER for any new or worsening symptoms. You were seen in the Emergency Room for left shoulder/elbow pain after a fall. All labs/Xrays rule out any emergent findings or fractures.  Follow-up with your primary care provider in 1-2 days.  Follow up with Orthopedics in the next 5-7 days.   Follow up with your Nephrologist in the next 1-2 days.   Continue to take all medications as prescribed.  Rest and drink plenty of fluids. Pain can be managed with Acetaminophen (aka Tylenol) over the counter as directed.  Return to the ER for any new or worsening symptoms.

## 2020-09-30 NOTE — ED ADULT NURSE NOTE - NSIMPLEMENTINTERV_GEN_ALL_ED
Implemented All Fall Risk Interventions:  [place] to call system. Call bell, personal items and telephone within reach. Instruct patient to call for assistance. Room bathroom lighting operational. Non-slip footwear when patient is off stretcher. Physically safe environment: no spills, clutter or unnecessary equipment. Stretcher in lowest position, wheels locked, appropriate side rails in place. Provide visual cue, wrist band, yellow gown, etc. Monitor gait and stability. Monitor for mental status changes and reorient to person, place, and time. Review medications for side effects contributing to fall risk. Reinforce activity limits and safety measures with patient and family.

## 2020-09-30 NOTE — ED ADULT NURSE NOTE - PMH
Chronic Hepatitis B    Dyslipidemia    Endometriosis    GERD (gastroesophageal reflux disease)    History of Nephrotic Syndrome    HTN (hypertension)    Membranous Glomerulonephropathy    Obesity, Class III, BMI 40-49.9 (morbid obesity)

## 2020-09-30 NOTE — ED PROVIDER NOTE - PATIENT PORTAL LINK FT
You can access the FollowMyHealth Patient Portal offered by [facility] by registering at the following website: [email or web address]. By joining [facility]’s FollowMyHealth portal, you will also be able to view your health information using other applications (apps) compatible with our system.

## 2020-10-02 ENCOUNTER — APPOINTMENT (OUTPATIENT)
Dept: ORTHOPEDIC SURGERY | Facility: CLINIC | Age: 50
End: 2020-10-02
Payer: COMMERCIAL

## 2020-10-02 VITALS
DIASTOLIC BLOOD PRESSURE: 81 MMHG | HEART RATE: 84 BPM | HEIGHT: 67 IN | BODY MASS INDEX: 33.12 KG/M2 | OXYGEN SATURATION: 95 % | WEIGHT: 211 LBS | SYSTOLIC BLOOD PRESSURE: 119 MMHG

## 2020-10-02 DIAGNOSIS — M75.42 IMPINGEMENT SYNDROME OF LEFT SHOULDER: ICD-10-CM

## 2020-10-02 DIAGNOSIS — M75.22 BICIPITAL TENDINITIS, LEFT SHOULDER: ICD-10-CM

## 2020-10-02 DIAGNOSIS — M75.102 UNSPECIFIED ROTATOR CUFF TEAR OR RUPTURE OF LEFT SHOULDER, NOT SPECIFIED AS TRAUMATIC: ICD-10-CM

## 2020-10-02 PROCEDURE — 99204 OFFICE O/P NEW MOD 45 MIN: CPT

## 2020-10-02 RX ORDER — TACROLIMUS 4 MG/1
TABLET, EXTENDED RELEASE ORAL
Refills: 0 | Status: ACTIVE | COMMUNITY

## 2020-10-02 RX ORDER — ENTECAVIR 0.5 MG/1
0.5 TABLET, FILM COATED ORAL
Refills: 0 | Status: ACTIVE | COMMUNITY

## 2020-10-02 RX ORDER — MYCOPHENOLIC ACID 360 MG/1
TABLET, DELAYED RELEASE ORAL
Refills: 0 | Status: ACTIVE | COMMUNITY

## 2020-10-02 NOTE — PHYSICAL EXAM
[de-identified] : Physical Examination\par General: well nourished, in no acute distress, alert and oriented to person, place and time\par Psychiatric: normal mood and affect, no abnormal movements or speech patterns\par Eyes: vision intact Beever is  glasses\par Throat: no thyromegaly\par Lymph: no enlarged nodes, no lymphedema in extremity\par Respiratory: no wheezing without use of stethescope, no shortness of breath with ambulation\par Cardiac: no cardiac leg swelling, 2+ peripheral pulses\par Neurology: normal gross sensation in extremities to light touch\par Abdomen: soft, non-tender, tympanic, no masses\par \par Musculoskeletal Examination\par Cervical spine	Full painless range of motion and negative Spurling's test\par \par Shoulder			Right			Left\par Appearance\par      Skin/Swelling/Deformity	normal			vascular access arm\par      Scapular Winging		-			-\par Range of Motion\par      Forward Flexion		170 / 170		20 / 170\par      Abduction			170 / 170		10 / 170\par      External Rotation		60			1/60\par      Internal Rotation		T10			post hip\par      SAbd Ext Rotation		90			70\par      SAbd Int Rotation		80			20\par      Painful Arc			-			-\par      Crepitus			-			-\par Palpation\par      Clavicle			-			-\par      AC Joint			-			-\par      Posterior Acromion		-			-\par      Levator Scapula		-			-\par      Lateral Bursa			-			+\par      Impingement Area		-			-\par      Biceps Tendon		-			-\par      Anterior Capsule		-			-\par Strength Examination\par      Supraspinatous 		5+ / 0			2+ / +\par      Infraspinatous			5+ / 0			2+ / +\par      Subscapularis			5+ / 0			5+ / 0\par      Belly Press			5+ / 0			5+ / 0\par      Lift Off			-			-\par      Drop-Arm			-			-\par Special Examination\par      Biceps [place]'s		-			-&\par      Impingement Neer		-			-&\par      Impingement Hawking		-			-&\par \par Sensation\par      Axillary			normal			normal\par      LatAntCubBrach 		normal			normal\par      Median 			normal			normal\par      Ulnar 			normal			normal\par      Radial 			normal			normal\par Motor\par      AIN 				normal			normal\par      Ulnar 			normal			normal\par      Radial 			normal			normal\par      PIN 				normal			normal\par Pulses\par      Radial			2+			2+\par  [de-identified] : 3 views of the affected Left shoulder [AP internal and external rotation, Y-View]\par Dated 9-30-20 were evaluated by myself today and\par demonstrate:\par normal bony calcification without dislocation and no fracture\par 	Arch	2B\par 	AC Joint	no Arthrosis\par 	GH Joint	no Arthrosis\par 	Calcifications	none, + stent for vascular access

## 2020-10-02 NOTE — HISTORY OF PRESENT ILLNESS
[de-identified] : CC Left shoulder\par \par HPI 49 yo Female RHD presents with acute onset of several days of constant pain in the lateral Left shoulder after a fall. The pain is same, and rated a 8 out of 10, described as dull burning cramping, [without radiation]. nothing makes the pain better and motion makes the pain worse. The patient reports associated symptoms inability to move arm. The patient [Reports/Denies] pain at night affecting sleep, - neck pain, and + similar pain previously.\par \par The patient has tried the following treatments:\par Activity modification	-\par Ice			-\par Nsaids    		-\par Physical Therapy  	-\par Cortisone Injection	-\par Arthroscopy/Surgery	-\par \par Review of Systems is positive for the above musculoskeletal symptoms and is otherwise non-contributory for general, constitutional, psychiatric, neurologic, HEENT, cardiac, respiratory, gastrointestinal, reproductive, lymphatic, and dermatologic complaints.\par \par Consult by

## 2020-10-02 NOTE — DISCUSSION/SUMMARY
[de-identified] : Left shoulder traumatic rotator cuff insufficiency, biceps tendinitis, impingement syndrome.\par \par I discussed my findings on history, exam and radiology.\par \par I reviewed the anatomy and function of the shoulder rotator cuff muscles and tendons, biceps tendon and labrum.Given the current findings for the patient, I recommend proceeding with advanced imaging to better characterize and diagnose the problem to aid patient care, management and treatment guidance as I suspect an internal injury to the knee not diagnosed on non-diagnostic plain radiographs causing the patients symptoms and physical examination to help provide surgical management planning.\par \par Therefore given the patients continued symptoms despite [ Non-operative management ] the patient has continued pain and weakness and impaired sleep affecting the patient's ADLs and limiting activities negatively affecting the patient's quality of life, examination and history consistent with internal shoulder derangement or rotator cuff injury with weakness of the rotator cuff muscles along with severe pain I am recommending obtaining advanced MRI imaging of the shoulder to identify damaged structures in order to facilite preopertive planning. I discussed with the patient that I am ordering an MRI to assess the soft tissue structures in the joint such as the joint capsule, ligaments, rotator cuff and biceps tendons, as well as to assess the cartilage, muscle bellies, cysts, AC joint edema or other pathology. The test will need insurance approval and will take place at a [facility] or outside facility. If the patient elects to obtain the MRI from an outside facility, the patient understands they have been instructed to bring in both the final radiologist read and a CD/DVD with the MRI images to allow review of the imaging test by myself during the follow up visit. I do not recommend obtaining an open standing MRI as the quality of the images is subpar and makes it difficult to diagnose intra-articular derangements and guide care discussion and decision making.\par \par patient will confirm if mri is possible as well as surgery,\par \par The patient verifies their understanding the the visit, diagnosis and plan. They agree with the treatment plan and will contact the office with any questions or problems.\par \par FU after MRI completed

## 2020-11-09 NOTE — ED ADULT NURSE NOTE - NS ED NURSE LEVEL OF CONSCIOUSNESS MENTAL STATUS
Awake/Alert/Cooperative [No Acute Distress] : no acute distress [Well-Appearing] : well-appearing [Normal Voice/Communication] : normal voice/communication [Normal Sclera/Conjunctiva] : normal sclera/conjunctiva [PERRL] : pupils equal round and reactive to light [Normal Outer Ear/Nose] : the outer ears and nose were normal in appearance [Normal Oropharynx] : the oropharynx was normal [Normal] : normal rate, regular rhythm, normal S1 and S2 and no murmur heard [No Edema] : there was no peripheral edema [Soft] : abdomen soft [Non Tender] : non-tender [Non-distended] : non-distended [No Masses] : no abdominal mass palpated [No HSM] : no HSM [Normal Bowel Sounds] : normal bowel sounds [No CVA Tenderness] : no CVA  tenderness [No Spinal Tenderness] : no spinal tenderness [No Rash] : no rash [No Focal Deficits] : no focal deficits [Normal Affect] : the affect was normal [Alert and Oriented x3] : oriented to person, place, and time [Normal Mood] : the mood was normal [Normal Insight/Judgement] : insight and judgment were intact [No Skin Lesions] : no skin lesions [de-identified] : B/L ear canal with partial cerumen impaction, TMs not well visualized, nasal mucosa is edematous, turbinates with blue hue

## 2020-12-01 ENCOUNTER — APPOINTMENT (OUTPATIENT)
Dept: VASCULAR SURGERY | Facility: CLINIC | Age: 50
End: 2020-12-01
Payer: COMMERCIAL

## 2020-12-01 VITALS
HEART RATE: 69 BPM | WEIGHT: 211 LBS | DIASTOLIC BLOOD PRESSURE: 79 MMHG | SYSTOLIC BLOOD PRESSURE: 117 MMHG | HEIGHT: 67 IN | BODY MASS INDEX: 33.12 KG/M2

## 2020-12-01 VITALS — TEMPERATURE: 96.4 F

## 2020-12-01 PROCEDURE — 99072 ADDL SUPL MATRL&STAF TM PHE: CPT

## 2020-12-01 PROCEDURE — 99213 OFFICE O/P EST LOW 20 MIN: CPT

## 2020-12-01 NOTE — HISTORY OF PRESENT ILLNESS
[FreeTextEntry1] : 49 yo female with history of esrd on hd via left upper extremity avf presents for follow up.  pt s/p recent kidney transplant about 6 months ago no longer on hd.  pt with  pain around the avf and some weakness of the hand that has been present since the fistula was placed  [] : left brachiocephalic fistula

## 2020-12-01 NOTE — PHYSICAL EXAM
[Thrill] : thrill [Pulsatile Thrill] : pulsatile thrill [Bleeding] : no bleeding [Hand well perfused] : hand well perfused [Ulcer] : no ulcer [2+] : right 2+ [1+] : left 1+ [Normal] : coordination grossly intact, no focal deficits [de-identified] :  strength 5/5 b/l upper extremities [de-identified] : intact

## 2020-12-01 NOTE — DISCUSSION/SUMMARY
[FreeTextEntry1] : 51 yo female with history of esrd on hd via left upper extremity avf presents for follow up s/p recent kidney transplant \par \par \par at this time will continue to monitor \par pt to follow up with nephrology regarding possible ligation

## 2021-01-27 NOTE — PATIENT PROFILE ADULT - VISION (WITH CORRECTIVE LENSES IF THE PATIENT USUALLY WEARS THEM):
Pt not in room.       Aisha Bocanegra RN  01/26/21 1932 Partially impaired: cannot see medication labels or newsprint, but can see obstacles in path, and the surrounding layout; can count fingers at arm's length

## 2021-07-02 NOTE — PROGRESS NOTE ADULT - ASSESSMENT
47yo F with ESRD (2/2 Membranous Nephropathy, HD on M/W/F) and HTN p/w SOB in the setting of Pulm Edema/Fluid Overload with complaint of persistent chronic HA.  -CT Head without acute intracranial pathology    see below
48y female with PMH of nephrotic syndrome ESRD on dialysis M,W,F and HTN presenting with SOB. Started this evening woke her up from sleep.  Worse when supine. Was generally not feeling well yesterday.  Denies chest pain, cough, n/v/d, abdominal pain, LE swelling.  Still makes urine.  Medications: labetalol and lasix.    acute pulm edema secondary to fluid overload due to ESRD  hyperkalemia  - s/p lasix  - HD per renal    HTN  - cw labetalol    head ache  - secondary to nitrates  - Dilaudid  prn  - ct head is normal  - neuro eval called
48y female with PMH of nephrotic syndrome ESRD on dialysis M,W,F and HTN presenting with SOB. Started this evening woke her up from sleep.  Worse when supine. Was generally not feeling well yesterday.  Denies chest pain, cough, n/v/d, abdominal pain, LE swelling.  Still makes urine.  Medications: labetalol and lasix.    acute pulm edema secondary to fluid overload due to ESRD  hyperkalemia  - s/p lasix  - HD per renal    HTN  - cw labetalol    head ache  - secondary to nitrates  - Dilaudid  prn  - ct head is normal  - neuro eval noted    d/c home after HD today if ok with renal
48y female with PMH of nephrotic syndrome ESRD on dialysis M,W,F and HTN presenting with SOB. Started this evening woke her up from sleep.  Worse when supine. Was generally not feeling well yesterday.  Denies chest pain, cough, n/v/d, abdominal pain, LE swelling.  Still makes urine.  Medications: labetalol and lasix.    acute pulm edema secondary to fluid overload due to ESRD  hyperkalemia  - s/p lasix  - HD per renal  - had HD last night    HTN  - cw labetalol    head ache  - Suspect headache secondary to hypertensive urgency and then side effect of nitrate.   - Advised to discuss options for hemodialysis headache with renal. Continue optimize hypertension control  - Tylenol as needed but does not provide much relief. Unable to take NSAIDs.  - No further inpatient neuro reccs at this time except will try magnesium prior to hd, which was done on 1/23  - often changing HD parameters it is possible to diminish the headaches  - pt with neck pain, can get pt as outpt  - no neuro c/i to d/c.       d/c home
48yoF w/ nephrotic syndrome 2/2 membranous nephropathy, hepatitis B s/p treatment, HTN, ESRD on HD MWF, here for SOB.
49yo F with ESRD (2/2 Membranous Nephropathy, HD on M/W/F) and HTN p/w SOB in the setting of Pulm Edema/Fluid Overload with complaint of persistent chronic HA.  -CT Head without acute intracranial pathology    #Acute on Chronic Headache - mixed etiology including HTN and nitrate use  -patient's acute HA likely HTN-mediated and exacerbated by nitrate use  -HTN management as per Primary Team for better control  -chronic HA since October concerning for Hemodialysis-Induced Headache; would ask Renal for input whether dialysate is possible cause vs role for Magnesium supplementation
48yoF w/ nephrotic syndrome 2/2 membranous nephropathy, hepatitis B s/p treatment, HTN, ESRD on HD MWF, here for SOB x1 day found to be fluid overloaded s/p HD upon arrival to the ED with 1.3L removed. She was noted to be hypertensive upon arrival as well. Patient still makes urine. Pulmonary consulted for persistent dyspnea after HD.       1.  Dyspnea-  -Etiology: related to pulmonary edema  -Patient improved clinically on RA  -HD removed 1.3L, follow up renal  -PE: speaking in full sentences, 95% on RA, crackles to midfields bilaterally.   -Cont volume optimization per nephrology    Will sign off, call with questions.    Tuan Sears, DO  Pulmonary and Critical Fellow
Hold Lisinopril for LADI

## 2021-07-16 ENCOUNTER — TRANSCRIPTION ENCOUNTER (OUTPATIENT)
Age: 51
End: 2021-07-16

## 2021-08-05 ENCOUNTER — APPOINTMENT (OUTPATIENT)
Dept: HEPATOLOGY | Facility: CLINIC | Age: 51
End: 2021-08-05
Payer: COMMERCIAL

## 2021-08-05 VITALS
WEIGHT: 226 LBS | HEART RATE: 73 BPM | TEMPERATURE: 98 F | DIASTOLIC BLOOD PRESSURE: 104 MMHG | HEIGHT: 67 IN | SYSTOLIC BLOOD PRESSURE: 134 MMHG | RESPIRATION RATE: 15 BRPM | BODY MASS INDEX: 35.47 KG/M2 | OXYGEN SATURATION: 93 %

## 2021-08-05 PROCEDURE — 99214 OFFICE O/P EST MOD 30 MIN: CPT

## 2021-08-06 LAB
AFP-TM SERPL-MCNC: 2.2 NG/ML
ALBUMIN SERPL ELPH-MCNC: 4.3 G/DL
ALP BLD-CCNC: 93 U/L
ALT SERPL-CCNC: 13 U/L
ANION GAP SERPL CALC-SCNC: 18 MMOL/L
AST SERPL-CCNC: 16 U/L
BASOPHILS # BLD AUTO: 0.03 K/UL
BASOPHILS NFR BLD AUTO: 0.4 %
BILIRUB SERPL-MCNC: 0.4 MG/DL
BUN SERPL-MCNC: 17 MG/DL
CALCIUM SERPL-MCNC: 10.3 MG/DL
CHLORIDE SERPL-SCNC: 104 MMOL/L
CO2 SERPL-SCNC: 18 MMOL/L
CREAT SERPL-MCNC: 0.96 MG/DL
EOSINOPHIL # BLD AUTO: 0.41 K/UL
EOSINOPHIL NFR BLD AUTO: 5.3 %
HBV CORE IGG+IGM SER QL: REACTIVE
HBV E AB SER QL: REACTIVE
HBV E AG SER QL: NONREACTIVE
HBV SURFACE AB SER QL: NONREACTIVE
HBV SURFACE AG SER QL: REACTIVE
HCT VFR BLD CALC: 50.8 %
HGB BLD-MCNC: 15.6 G/DL
IMM GRANULOCYTES NFR BLD AUTO: 0.4 %
LYMPHOCYTES # BLD AUTO: 0.96 K/UL
LYMPHOCYTES NFR BLD AUTO: 12.3 %
MAN DIFF?: NORMAL
MCHC RBC-ENTMCNC: 28.8 PG
MCHC RBC-ENTMCNC: 30.7 GM/DL
MCV RBC AUTO: 93.7 FL
MONOCYTES # BLD AUTO: 0.66 K/UL
MONOCYTES NFR BLD AUTO: 8.5 %
NEUTROPHILS # BLD AUTO: 5.7 K/UL
NEUTROPHILS NFR BLD AUTO: 73.1 %
PLATELET # BLD AUTO: 237 K/UL
POTASSIUM SERPL-SCNC: 5.1 MMOL/L
PROT SERPL-MCNC: 6.9 G/DL
RBC # BLD: 5.42 M/UL
RBC # FLD: 14 %
SODIUM SERPL-SCNC: 140 MMOL/L
WBC # FLD AUTO: 7.79 K/UL

## 2021-08-07 LAB
HBV DNA # SERPL NAA+PROBE: NOT DETECTED IU/ML
HEPB DNA PCR LOG: NOT DETECTED LOG10IU/ML

## 2021-08-16 ENCOUNTER — OUTPATIENT (OUTPATIENT)
Dept: OUTPATIENT SERVICES | Facility: HOSPITAL | Age: 51
LOS: 1 days | End: 2021-08-16
Payer: COMMERCIAL

## 2021-08-16 ENCOUNTER — APPOINTMENT (OUTPATIENT)
Dept: ULTRASOUND IMAGING | Facility: IMAGING CENTER | Age: 51
End: 2021-08-16
Payer: COMMERCIAL

## 2021-08-16 DIAGNOSIS — B18.1 CHRONIC VIRAL HEPATITIS B WITHOUT DELTA-AGENT: ICD-10-CM

## 2021-08-16 DIAGNOSIS — Z00.8 ENCOUNTER FOR OTHER GENERAL EXAMINATION: ICD-10-CM

## 2021-08-16 PROCEDURE — 76700 US EXAM ABDOM COMPLETE: CPT | Mod: 26

## 2021-08-16 PROCEDURE — 76700 US EXAM ABDOM COMPLETE: CPT

## 2021-09-27 NOTE — PATIENT PROFILE ADULT - BRADEN FRICTION AND SHEAR
(3) no apparent problem Clofazimine Counseling:  I discussed with the patient the risks of clofazimine including but not limited to skin and eye pigmentation, liver damage, nausea/vomiting, gastrointestinal bleeding and allergy.

## 2021-09-28 NOTE — DISCHARGE NOTE ADULT - HAS THE PATIENT RECEIVED THE INFLUENZA VACCINE THIS SEASON?
Medicare Wellness Visit  Plan for Preventive Care    A good way for you to stay healthy is to use preventive care.  Medicare covers many services that can help you stay healthy.* The goal of these services is to find any health problems as quickly as possible. Finding problems early can help make them easier to treat.  Your personal plan below lists the services you may need and when they are due.     Health Maintenance Summary     Shingles Vaccine (2 of 3)  Overdue since 4/12/2017    Medicare Wellness Visit (Yearly)  Overdue - never done    Osteoporosis Screening (Once)  Order placed this encounter    Pneumococcal Vaccine 65+ (1 of 1 - PPSV23)  Overdue - never done    Influenza Vaccine (1)  Due since 9/1/2021    DTaP/Tdap/Td Vaccine (2 - Td or Tdap)  Next due on 12/28/2022    Breast Cancer Screening (Every 2 Years)  Next due on 8/2/2023    Colorectal Cancer Screen- (Cologuard - Every 3 Years)  Next due on 8/3/2023    Hepatitis B Vaccine   Aged Out    Hepatitis C Screening   Completed    COVID-19 Vaccine   Completed    Meningococcal Vaccine   Aged Out    HPV Vaccine   Aged Out           Preventive Care for Women and Men    Heart Screenings (Cardiovascular):  · Blood tests are used to check your cholesterol, lipid and triglyceride levels. High levels can increase your risk for heart disease and stroke. High levels can be treated with medications, diet and exercise. Lowering your levels can help keep your heart and blood vessels healthy.  Your provider will order these tests if they are needed.    · An ultrasound is done to see if you have an abdominal aortic aneurysm (AAA).  This is an enlargement of one of the main blood vessels that delivers blood to the body.   In the [place], 9,000 deaths are caused by AAA.  You may not even know you have this problem and as many as 1 in 3 people will have a serious problem if it is not treated.  Early diagnosis allows for more effective treatment and cure.  If you have a  family history of AAA or are a male age 65-75 who has smoked, you are at higher risk of an AAA.  Your provider can order this test, if needed.    Colorectal Screening:  · There are many tests that are used to check for cancer of your colon and rectum. You and your provider should discuss what test is best for you and when to have it done.  Options include:  · Screening Colonoscopy: exam of the entire colon, seen through a flexible lighted tube.  · Flexible Sigmoidoscopy: exam of the last third (sigmoid portion) of the colon and rectum, seen through a flexible lighted tube.  · Cologuard DNA stool test: a sample of your stool is used to screen for cancer and unseen blood in your stool.  · Fecal Occult Blood Test: a sample of your stool is studied to find any unseen blood    Flu Shot:  · An immunization that helps to prevent influenza (the flu). You should get this every year. The best time to get the shot is in the fall.    Pneumococcal Shot:  • Vaccines are available that can help prevent pneumococcal disease, which is any type of infection caused by Streptococcus pneumoniae bacteria.   Their use can prevent some cases of pneumonia, meningitis, and sepsis. There are two types of pneumococcal vaccines:   o Conjugate vaccines (PCV-13 or Prevnar 13®) - helps protect against the 13 types of pneumococcal bacteria that are the most common causes of serious infections in children and adults.    o Polysaccharide vaccine (PPSV23 or Cvmtdabbn51®) - helps protect against 23 types of pneumococcal bacteria for patients who are recommended to get it.  These vaccines should be given at least 12 months apart.  A booster is usually not needed.     Hepatitis B Shot:  · An immunization that helps to protect people from getting Hepatitis B. Hepatitis B is a virus that spreads through contact with infected blood or body fluids. Many people with the virus do not have symptoms.  The virus can lead to serious problems, such as liver  disease. Some people are at higher risk than others. Your doctor will tell you if you need this shot.     Diabetes Screening:  · A test to measure sugar (glucose) in your blood is called a fasting blood sugar. Fasting means you cannot have food or drink for at least 8 hours before the test. This test can detect diabetes long before you may notice symptoms.    Glaucoma Screening:  · Glaucoma screening is performed by your eye doctor. The test measures the fluid pressure inside your eyes to determine if you have glaucoma.     Hepatitis C Screening:  · A blood test to see if you have the hepatitis C virus.  Hepatitis C attacks the liver and is a major cause of chronic liver disease.  Medicare will cover a single screening for all adults born between 1945 & 1965, or high risk patients (people who have injected illegal drugs or people who have had blood transfusions).  High risk patients who continue to inject illegal drugs can be screened for Hepatitis C every year.    Smoking and Tobacco-Use Cessation Counseling:  · Tobacco is the single greatest cause of disease and early death in our country today. Medication and counseling together can increase a person’s chance of quitting for good.   · Medicare covers two quitting attempts per year, with four counseling sessions per attempt (eight sessions in a 12 month period)    Preventive Screening tests for Women    Screening Mammograms and Breast Exams:  · An x-ray of your breasts to check for breast cancer before you or your doctor may be able to feel it.  If breast cancer is found early it can usually be treated with success.    Pelvic Exams and Pap Tests:  · An exam to check for cervical and vaginal cancer. A Pap test is a lab test in which cells are taken from your cervix and sent to the lab to look for signs of cervical cancer. If cancer of the cervix is found early, chances for a cure are good. Testing can generally end at age 65, or if a woman has a hysterectomy for a  benign condition. Your provider may recommend more frequent testing if certain abnormal results are found.    Bone Mass Measurements:  · A painless x-ray of your bone density to see if you are at risk for a broken bone. Bone density refers to the thickness of bones or how tightly the bone tissue is packed.    Preventive Screening tests for Men    Prostate Screening:  · Should you have a prostate cancer test (PSA)?  It is up to you to decide if you want a prostate cancer test. Talk to your clinician to find out if the test is right for you.  Things for you to consider and talk about should include:  · Benefits and harms of the test  · Your family history  · How your race/ethnicity may influence the test  · If the test may impact other medical conditions you have  · Your values on screenings and treatments    *Medicare pays for many preventive services to keep you healthy. For some of these services, you might have to pay a deductible, coinsurance, and / or copayment.  The amounts vary depending on the type of services you need and the kind of Medicare health plan you have.    For further details on screenings offered by Medicare please visit: https://www.medicare.gov/coverage/preventive-screening-services    no...

## 2021-12-15 ENCOUNTER — TRANSCRIPTION ENCOUNTER (OUTPATIENT)
Age: 51
End: 2021-12-15

## 2022-01-01 NOTE — PAST MEDICAL HISTORY
[Increasing age ( >40 years old)] : Increasing age ( >40 years old) [No therapy indicated for cases scheduled for less than one hour] : No therapy indicated for cases scheduled for less than one hour. [FreeTextEntry1] : Malignant Hyperthermia Screening Tool and Risk of Bleeding Assessment \par \par \par \par Ms. ROSEMARY GUSTAFSON denies family of unexpected death following Anesthesia or Exercise.\par Denies Family history of Malignant Hyperthermia, Muscle or Neuromuscular disorder and High Temperature  following exercise.\par \par Ms. ROSEMARY GUSTAFSON denies history of Muscle Spasm, Dark or Chocolate- Colored and Unanticipated fever immediately following anaesthesia or serious exercise.\par Ms. ROSEMARY GUSTAFSON also denies bleeding tendencies/Risks of Bleeding.\par  1/13/22/negative

## 2022-02-10 ENCOUNTER — APPOINTMENT (OUTPATIENT)
Dept: HEPATOLOGY | Facility: CLINIC | Age: 52
End: 2022-02-10
Payer: COMMERCIAL

## 2022-02-10 VITALS
HEART RATE: 71 BPM | DIASTOLIC BLOOD PRESSURE: 91 MMHG | OXYGEN SATURATION: 99 % | TEMPERATURE: 97.5 F | RESPIRATION RATE: 16 BRPM | WEIGHT: 216 LBS | SYSTOLIC BLOOD PRESSURE: 134 MMHG | HEIGHT: 67 IN | BODY MASS INDEX: 33.9 KG/M2

## 2022-02-10 DIAGNOSIS — Z99.2 DEPENDENCE ON RENAL DIALYSIS: ICD-10-CM

## 2022-02-10 DIAGNOSIS — Z99.2 END STAGE RENAL DISEASE: ICD-10-CM

## 2022-02-10 DIAGNOSIS — N18.6 END STAGE RENAL DISEASE: ICD-10-CM

## 2022-02-10 PROCEDURE — 99214 OFFICE O/P EST MOD 30 MIN: CPT

## 2022-02-10 RX ORDER — CINACALCET HYDROCHLORIDE 30 MG/1
30 TABLET, COATED ORAL DAILY
Refills: 0 | Status: DISCONTINUED | COMMUNITY
Start: 2020-08-27 | End: 2022-02-10

## 2022-02-10 RX ORDER — SULFAMETHOXAZOLE AND TRIMETHOPRIM 400; 80 MG/1; MG/1
400-80 TABLET ORAL
Refills: 0 | Status: DISCONTINUED | COMMUNITY
End: 2022-02-10

## 2022-02-10 RX ORDER — SULFAMETHOXAZOLE AND TRIMETHOPRIM 400; 80 MG/1; MG/1
400-80 TABLET ORAL
Refills: 0 | Status: DISCONTINUED | COMMUNITY
Start: 2020-08-27 | End: 2022-02-10

## 2022-02-10 RX ORDER — TACROLIMUS 5 MG/1
5 CAPSULE, GELATIN COATED ORAL
Refills: 0 | Status: DISCONTINUED | COMMUNITY
Start: 2020-08-27 | End: 2022-02-10

## 2022-02-11 LAB
AFP-TM SERPL-MCNC: 2.1 NG/ML
ALBUMIN SERPL ELPH-MCNC: 5.2 G/DL
ALP BLD-CCNC: 79 U/L
ALT SERPL-CCNC: 15 U/L
ANION GAP SERPL CALC-SCNC: 16 MMOL/L
AST SERPL-CCNC: 16 U/L
BASOPHILS # BLD AUTO: 0.03 K/UL
BASOPHILS NFR BLD AUTO: 0.5 %
BILIRUB SERPL-MCNC: 0.6 MG/DL
BUN SERPL-MCNC: 13 MG/DL
CALCIUM SERPL-MCNC: 10.9 MG/DL
CHLORIDE SERPL-SCNC: 105 MMOL/L
CO2 SERPL-SCNC: 25 MMOL/L
CREAT SERPL-MCNC: 1.01 MG/DL
EOSINOPHIL # BLD AUTO: 0.21 K/UL
EOSINOPHIL NFR BLD AUTO: 3.8 %
HCT VFR BLD CALC: 51.7 %
HGB BLD-MCNC: 15.9 G/DL
IMM GRANULOCYTES NFR BLD AUTO: 0.2 %
LYMPHOCYTES # BLD AUTO: 1.04 K/UL
LYMPHOCYTES NFR BLD AUTO: 18.6 %
MAN DIFF?: NORMAL
MCHC RBC-ENTMCNC: 29.3 PG
MCHC RBC-ENTMCNC: 30.8 GM/DL
MCV RBC AUTO: 95.4 FL
MONOCYTES # BLD AUTO: 0.43 K/UL
MONOCYTES NFR BLD AUTO: 7.7 %
NEUTROPHILS # BLD AUTO: 3.87 K/UL
NEUTROPHILS NFR BLD AUTO: 69.2 %
PLATELET # BLD AUTO: 244 K/UL
POTASSIUM SERPL-SCNC: 4.6 MMOL/L
PROT SERPL-MCNC: 7.5 G/DL
RBC # BLD: 5.42 M/UL
RBC # FLD: 13.4 %
SODIUM SERPL-SCNC: 146 MMOL/L
WBC # FLD AUTO: 5.59 K/UL

## 2022-02-12 LAB
HBV CORE IGG+IGM SER QL: REACTIVE
HBV E AB SER QL: REACTIVE
HBV E AG SER QL: NONREACTIVE
HBV SURFACE AB SER QL: NONREACTIVE
HBV SURFACE AG SER QL: REACTIVE

## 2022-02-15 LAB
HBV DNA # SERPL NAA+PROBE: NOT DETECTED IU/ML
HEPB DNA PCR LOG: NOT DETECTED LOG10IU/ML

## 2022-03-09 ENCOUNTER — APPOINTMENT (OUTPATIENT)
Dept: ULTRASOUND IMAGING | Facility: IMAGING CENTER | Age: 52
End: 2022-03-09
Payer: COMMERCIAL

## 2022-03-09 ENCOUNTER — OUTPATIENT (OUTPATIENT)
Dept: OUTPATIENT SERVICES | Facility: HOSPITAL | Age: 52
LOS: 1 days | End: 2022-03-09
Payer: COMMERCIAL

## 2022-03-09 DIAGNOSIS — Z00.8 ENCOUNTER FOR OTHER GENERAL EXAMINATION: ICD-10-CM

## 2022-03-09 DIAGNOSIS — N18.6 END STAGE RENAL DISEASE: ICD-10-CM

## 2022-03-09 PROCEDURE — 76700 US EXAM ABDOM COMPLETE: CPT

## 2022-03-09 PROCEDURE — 76700 US EXAM ABDOM COMPLETE: CPT | Mod: 26

## 2022-03-23 ENCOUNTER — TRANSCRIPTION ENCOUNTER (OUTPATIENT)
Age: 52
End: 2022-03-23

## 2022-03-25 ENCOUNTER — APPOINTMENT (OUTPATIENT)
Dept: DISASTER EMERGENCY | Facility: HOSPITAL | Age: 52
End: 2022-03-25

## 2022-04-21 NOTE — ED ADULT TRIAGE NOTE - WEIGHT IN LBS
Can you contact this patient advised I reviewed x-rays and recommend a bunionectomy with distal metatarsal osteotomy.  This would be bilateral.  We would only do 1 at a time.  He can shoes which she would like to do 1st.  I did fill out pre-surgical paperwork.  It will be in today's folder.
199.9

## 2022-04-24 NOTE — ED ADULT NURSE NOTE - NEURO WDL
Alert and oriented to person, place and time, memory intact, behavior appropriate to situation, PERRL. 4 = No assist / stand by assistance

## 2022-05-03 NOTE — ED ADULT NURSE NOTE - CHIEF COMPLAINT
[FreeTextEntry1] : patient with appropriate preventative UTD \par no concerns on exam \par age appropriate counseling given\par Patient has a history of phimosis- taking lamisil as needed multiple epeidoses this eyar needs eval by uro to consider circumcision  The patient is a 48y Female complaining of shortness of breath.

## 2022-05-14 NOTE — PATIENT PROFILE ADULT. - NS PRO AD NO ADVANCE DIRECTIVE
History     Chief Complaint   Patient presents with     Nausea & Vomiting     Shaking     Irregular Heart Beat     HPI     Mattie Carranza is a 69 year old female with history of bipolar, hypothyroidism, hyperlipidemia, DM type II, JOSE, hypertension who presents ambulatory accompanied by her  for evaluation of potential reaction to buprenorphine.  She said Danya Quiroz at [facility] who suggested managing her chronic pain with buprenorphine to improve her quality of life.  She was started on buprenorphine 2 mg SL.  She took her first dose this morning around 9:45 AM.  About 45 minutes later she got lightheaded, nausea.  She went to lunch with some friends and was unable to eat due to nausea, vomiting.  She continues to have nausea, feeling clammy, headache, feeling shaky.  She did have an episode of midsternal chest discomfort that felt sharp, aching, pressure.  This lasted only a couple of minutes.  She has not had recurrence of this chest discomfort.  She denies dyspnea, dyspnea on exertion.  She endorses a history of palpitations.  She did experience some palpitations this afternoon after taking medication.  It did not feel like more than her usual palpitations that she does feel.  She denies feeling lightheaded or dizzy currently.  She has not had any syncopal episodes.  She denies any abdominal pain, diarrhea.    Allergies:  Allergies   Allergen Reactions     Arthrotec      GI upset     Aspirin Hives     Bupropion Nausea     pain     Codeine Nausea     Diagnostic X-Ray Materials Hives     Diazepam Other (See Comments)     Valium  Increased anxiety     Dye [Contrast Dye] Hives     IV dye, iodine containing contrast media     Fentanyl       (When mixed with Versed) Agitated and combative.     Iodine      Latex Hives     From gloves and elastic     Meperidine Nausea     FUM[facility] Dischg Summary. If given alone     Midazolam      F[facility] Dischg Summary. Patient unaware     Midazolam Hcl         anxiety     Other [Seasonal Allergies] Hives     Also allergic to sun.  Allergist told her she is allergic to the sun.     Penicillins Hives     Sulfa Drugs Hives     Sulfa (sulfonamide antibiotics)       Ziprasidone      FUMCM Dischg Summary. Patient unaware       Problem List:    Patient Active Problem List    Diagnosis Date Noted     Tobacco use disorder 03/08/2019     Priority: Medium     Fibromyalgia 03/08/2019     Priority: Medium     Morbid obesity (H) 03/08/2019     Priority: Medium     Heart murmur 08/03/2018     Priority: Medium     AV sclerosis       Bipolar I disorder (H) 08/03/2018     Priority: Medium     Asthma 08/03/2018     Priority: Medium     Hypothyroidism 08/03/2018     Priority: Medium     Chronic idiopathic thrombocytopenic purpura (H) 08/03/2018     Priority: Medium     Anxiety 10/27/2015     Priority: Medium     Depression 10/27/2015     Priority: Medium     JOSE (obstructive sleep apnea)- moderate (AHI 27) 04/27/2015     Priority: Medium     Sleep study 8/30/2012 (227#) Index 41 events per hour with a low SAT of 69%. Titrated to 13 cm nasal CPAP  Sleep study [facility] 12/05/2013 (228 pounds). No slow-wave or REM sleep. Apnea-hypopnea index of 15 events per hour. Low saturation was 83%. CPAP titrated to a level of 9 cm  Sleep Study [facility] 3/22/17 (251#) AHI 26.7/hour. Low O2 82%       Dysphonia 07/18/2014     Priority: Medium     TMJ (temporomandibular joint syndrome) 07/24/2013     Priority: Medium     Perennial allergic rhinitis 07/24/2013     Priority: Medium     Vision changes 07/24/2013     Priority: Medium     Orthostatic hypotension 07/24/2013     Priority: Medium     Diabetes mellitus, type 2 (H) 03/28/2013     Priority: Medium     Dyslipidemia 03/28/2013     Priority: Medium     Insomnia 09/20/2012     Priority: Medium     Advanced care planning/counseling discussion 04/26/2012     Priority: Medium     Irritable bowel syndrome 04/02/2009     Priority: Medium     Other  chronic nonalcoholic liver disease 10/31/2007     Priority: Medium     Hyperlipidemia 11/17/2006     Priority: Medium     Formatting of this note might be different from the original.  IMO Update 10/11       Essential hypertension 11/17/2006     Priority: Medium     Formatting of this note might be different from the original.  IMO Update       Urticaria 03/08/2019     Priority: Low        Past Medical History:    Past Medical History:   Diagnosis Date     Adenomatous colon polyp 10/17/2011     ADHD (attention deficit hyperactivity disorder) 10/17/2011     Atypical chest pain 8/3/2018     Degenerative disk disease 10/17/2011     Diverticulosis 10/17/2011     Dizziness 09/03/2013     Epistaxis 9/3/2013     Head trauma 7/13/2012     Psychosis (H) 2/3/2016     Seizures 07/13/2012       Past Surgical History:    Past Surgical History:   Procedure Laterality Date     APPENDECTOMY       CHOLECYSTECTOMY  1983     COLONOSCOPY  2012    diverticulosis -- due 2017     DENTAL SURGERY  2012    biopsy oral lesion [Other]     ENT SURGERY  1990    upper and lower jaw [Other]     GYN SURGERY  1981    tx of ectopic pregnancy with left tube removal [Other]     JOINT REPLACEMENT  2009    TOTAL KNEE ARTHROPLASTY 2009 Left      OSTEOTOMY FOOT Left 8/2/2018    Procedure: OSTEOTOMY FOOT;  LEFT FOOT/ANKLE: FLATFOOT RECONSTRUCTION: calcaneal osteotomy, medial arch tendon repair/transfer & ligament repair; midfoot osteootmy, gastrocnemius recessionTranfer,Spring Ligament Repair,Cotton Osteotomy;  Surgeon: Mehdi Vidal DPM;  Location: [facility]  1992     uretherostomy  2005       Family History:    Family History   Problem Relation Age of Onset     Cancer Mother 68        colon; cause of death     Diabetes Mother      Other - See Comments Mother         hyperthyroidism     Diabetes Father      Heart Disease Father 68        myocardial infarction; cause of death     Allergies Father      Other - See Comments Brother 64         "CAD s/p stenting     Diabetes Brother      Cancer Brother         non hodgkins lymphoma     Hypertension Brother      Other - See Comments Brother 41        quadraplegia; cause of death       Social History:  Marital Status:   [2]  Social History     Tobacco Use     Smoking status: Former Smoker     Packs/day: 0.50     Years: 40.00     Pack years: 20.00     Types: Cigarettes     Start date: 11/21/2020     Smokeless tobacco: Never Used   Substance Use Topics     Alcohol use: Yes     Comment: \"very rare\", but states that she drank yesterday and today, hx of ETOH abuse     Drug use: No        Medications:    acetaminophen (TYLENOL) 325 MG tablet  albuterol (ACCUNEB) 0.63 MG/3ML neb solution  albuterol (PROAIR HFA/PROVENTIL HFA/VENTOLIN HFA) 108 (90 Base) MCG/ACT Inhaler  beclomethasone (QNASL) 80 MCG/ACT nasal aerosol  cetirizine (ZYRTEC) 10 MG tablet  cholecalciferol 5000 UNITS CAPS  ClonazePAM (KLONOPIN PO)  EPINEPHrine (ANY BX GENERIC EQUIV) 0.3 MG/0.3ML injection 2-pack  fluticasone (FLONASE) 50 MCG/ACT nasal spray  fluticasone (FLOVENT HFA) 110 MCG/ACT Inhaler  fluticasone (VERAMYST) 27.5 MCG/SPRAY nasal spray  Garlic 10 MG CAPS  glucose blood VI test strips (PRECISION XTRA GLUCOSE) strip  KRILL OIL PO  LANCETS MISC.  LEVOTHYROXINE SODIUM PO  loratadine (CLARITIN) 10 MG tablet  metFORMIN (GLUCOPHAGE) 500 MG tablet  montelukast (SINGULAIR) 10 MG tablet  Multiple Vitamin (MULTI VITAMIN DAILY PO)  Nitroglycerin (NITROSTAT SL)  order for DME  SUBLINGUAL IMMUNOTHERAPY, SLIT,  TRAZODONE HCL PO  Turmeric 500 MG CAPS  vitamin C (ASCORBIC ACID) 250 MG TABS tablet  Zinc 25 MG TABS          Review of Systems   Constitutional: Negative for appetite change, chills, fatigue and fever.   HENT: Negative for congestion, ear pain, rhinorrhea and sore throat.    Eyes: Negative for photophobia and visual disturbance.   Respiratory: Negative for cough and shortness of breath.    Cardiovascular: Positive for chest pain and " palpitations. Negative for leg swelling.   Gastrointestinal: Positive for nausea and vomiting. Negative for abdominal pain, constipation and diarrhea.   Genitourinary: Negative for difficulty urinating, dysuria, frequency and urgency.   Musculoskeletal: Positive for arthralgias (chronic) and back pain (chronic).   Skin: Negative for color change, rash and wound.   Neurological: Positive for dizziness, light-headedness and headaches. Negative for weakness.       Physical Exam   BP: (!) 185/100  Pulse: 97  Temp: 98.1  F (36.7  C)  Resp: 16  SpO2: 93 %      Physical Exam  Constitutional:       General: She is not in acute distress.     Appearance: Normal appearance. She is not ill-appearing or toxic-appearing.   HENT:      Head: Normocephalic.      Right Ear: Tympanic membrane, ear canal and external ear normal.      Left Ear: Tympanic membrane, ear canal and external ear normal.      Nose: Nose normal.      Mouth/Throat:      Lips: Pink.      Mouth: Mucous membranes are dry.      Pharynx: Oropharynx is clear. Uvula midline.   Eyes:      General: Lids are normal.      Extraocular Movements: Extraocular movements intact.      Conjunctiva/sclera: Conjunctivae normal.      Pupils: Pupils are equal, round, and reactive to light.   Cardiovascular:      Rate and Rhythm: Normal rate. Rhythm regularly irregular.      Pulses:           Posterior tibial pulses are 2+ on the right side and 2+ on the left side.      Heart sounds: S1 normal and S2 normal. No murmur heard.    No friction rub. No gallop.   Pulmonary:      Effort: Pulmonary effort is normal.      Breath sounds: Normal breath sounds.   Abdominal:      General: Bowel sounds are normal.      Palpations: Abdomen is soft.      Tenderness: There is no abdominal tenderness. There is no guarding or rebound.   Musculoskeletal:      Cervical back: Neck supple. No rigidity.      Right lower leg: No edema.      Left lower leg: No edema.      Comments: FROM of upper and lower  extremities   Skin:     General: Skin is warm and dry.      Capillary Refill: Capillary refill takes less than 2 seconds.      Coloration: Skin is not pale.   Neurological:      Mental Status: She is alert and oriented to person, place, and time.      Cranial Nerves: Cranial nerves are intact.      Gait: Gait is intact.   Psychiatric:         Speech: Speech normal.         Behavior: Behavior is cooperative.         ED Course              ED Course as of 05/13/22 2141   Fri May 13, 2022   2055      EKG Interpretation:     EKG Number: 1  Interpreted by Arabella Collins CNP  Symptoms at time of EKG: nausea, vomiting, diaphoresis   Rhythm: normal sinus   Rate: normal - 86 bpm  Axis: NORMAL  Ectopy: none  Conduction: normal NURIA 170 ms, normal  ms, normal  ms, normal QTc 476 ms  ST Segments/ T Waves: No ST-T wave changes  Q Waves: none  Comparison to prior:     Clinical Impression: sinus rhythm with sinus arrhythmia, no acute ischemic changes, normal rate and rhythm    Arabella Collins CNP on 5/13/2022 at 8:59 PM         Procedures           Results for orders placed or performed during the hospital encounter of 05/13/22 (from the past 24 hour(s))   Glucose by meter   Result Value Ref Range    GLUCOSE BY METER POCT 120 (H) 70 - 99 mg/dL   CBC with platelets differential    Narrative    The following orders were created for panel order CBC with platelets differential.  Procedure                               Abnormality         Status                     ---------                               -----------         ------                     CBC with platelets and d...[[id number]]  Abnormal            Final result                 Please view results for these tests on the individual orders.   Comprehensive metabolic panel   Result Value Ref Range    Sodium 140 133 - 144 mmol/L    Potassium 4.0 3.4 - 5.3 mmol/L    Chloride 107 94 - 109 mmol/L    Carbon Dioxide (CO2) 27 20 - 32 mmol/L    Anion Gap 6 3 - 14 mmol/L     Urea Nitrogen 14 7 - 30 mg/dL    Creatinine 0.58 0.52 - 1.04 mg/dL    Calcium 9.5 8.5 - 10.1 mg/dL    Glucose 137 (H) 70 - 99 mg/dL    Alkaline Phosphatase 89 40 - 150 U/L    AST 38 0 - 45 U/L    ALT 58 (H) 0 - 50 U/L    Protein Total 8.1 6.8 - 8.8 g/dL    Albumin 4.5 3.4 - 5.0 g/dL    Bilirubin Total 1.3 0.2 - 1.3 mg/dL    GFR Estimate >90 >60 mL/min/1.73m2   TSH with free T4 reflex   Result Value Ref Range    TSH 1.18 0.40 - 4.00 mU/L   Troponin I   Result Value Ref Range    Troponin I High Sensitivity 5 <54 ng/L   CBC with platelets and differential   Result Value Ref Range    WBC Count 12.8 (H) 4.0 - 11.0 10e3/uL    RBC Count 5.40 (H) 3.80 - 5.20 10e6/uL    Hemoglobin 16.0 (H) 11.7 - 15.7 g/dL    Hematocrit 48.1 (H) 35.0 - 47.0 %    MCV 89 78 - 100 fL    MCH 29.6 26.5 - 33.0 pg    MCHC 33.3 31.5 - 36.5 g/dL    RDW 13.2 10.0 - 15.0 %    Platelet Count 168 150 - 450 10e3/uL    % Neutrophils 85 %    % Lymphocytes 10 %    % Monocytes 4 %    % Eosinophils 0 %    % Basophils 0 %    % Immature Granulocytes 1 %    NRBCs per 100 WBC 0 <1 /100    Absolute Neutrophils 11.0 (H) 1.6 - 8.3 10e3/uL    Absolute Lymphocytes 1.2 0.8 - 5.3 10e3/uL    Absolute Monocytes 0.5 0.0 - 1.3 10e3/uL    Absolute Eosinophils 0.0 0.0 - 0.7 10e3/uL    Absolute Basophils 0.0 0.0 - 0.2 10e3/uL    Absolute Immature Granulocytes 0.1 <=0.4 10e3/uL    Absolute NRBCs 0.0 10e3/uL   Extra Tube    Narrative    The following orders were created for panel order Extra Tube.  Procedure                               Abnormality         Status                     ---------                               -----------         ------                     Extra Red Top Tube[[id number]]                               In process                   Please view results for these tests on the individual orders.       Medications   ondansetron (ZOFRAN) injection 4 mg (4 mg Intravenous Given 5/13/22 2100)   0.9% sodium chloride BOLUS (1,000 mLs Intravenous New Bag 5/13/22  2100)       Assessments & Plan (with Medical Decision Making)     I have reviewed the nursing notes.    I have reviewed the findings, diagnosis, plan and need for follow up with the patient.  (T50.779J) Adverse effect of drug, initial encounter  (primary encounter diagnosis)  (R11.2) Nausea with vomiting  (R00.2) Palpitations  Alert, pleasant 69-year-old female with history of chronic pain who was recently started on buprenorphine 2 mg sublingual tablets presents to the emergency department for evaluation of nausea, vomiting, lightheaded, clamminess, palpitations that have started since taking her first dose.  She denies any history of chronic opioid use.  She denies any history of illicit drug use.  Symptoms and timing of onset of symptoms being within 45 minutes of taking buprenorphine most consistent with adverse reaction.  I do wonder if this could be related to the dosing as she is opioid naïve.  I would recommend she not take buprenorphine and contact the provider who prescribed these Monday morning.  She is not having any concerning symptoms of dyspnea, respiratory depression, lethargy/somnolence.  It has been at least 12 hours since she took her first dose.  Although buprenorphine has a long half-life I would anticipate symptoms to continue improving. Plan for discharge home, symptomatic treatments.  Recommend:  - Do not take anymore buprenorphine   - Use ondansetron (Zofran) 4 mg every 6-8 hours as needed for nausea, vomiting  - Stay hydrated          Arabella Collins CNP        New Prescriptions    No medications on file       Final diagnoses:   Adverse effect of drug, initial encounter   Nausea with vomiting   Palpitations       5/13/2022   [facility]     Arabella Collins CNP  05/15/22 7812     No

## 2022-06-17 ENCOUNTER — APPOINTMENT (OUTPATIENT)
Dept: ORTHOPEDIC SURGERY | Facility: CLINIC | Age: 52
End: 2022-06-17
Payer: COMMERCIAL

## 2022-06-17 ENCOUNTER — APPOINTMENT (OUTPATIENT)
Dept: ORTHOPEDIC SURGERY | Facility: CLINIC | Age: 52
End: 2022-06-17

## 2022-06-17 VITALS — WEIGHT: 213 LBS | BODY MASS INDEX: 33.43 KG/M2 | HEIGHT: 67 IN

## 2022-06-17 DIAGNOSIS — M54.50 LOW BACK PAIN, UNSPECIFIED: ICD-10-CM

## 2022-06-17 PROCEDURE — 72100 X-RAY EXAM L-S SPINE 2/3 VWS: CPT

## 2022-06-17 PROCEDURE — 99214 OFFICE O/P EST MOD 30 MIN: CPT

## 2022-06-17 NOTE — DISCUSSION/SUMMARY
[de-identified] : The underlying pathophysiology was reviewed in great detail with the patient as well as the various treatment options, including ice, Physical therapy, epidural injection.\par \par Patient with lumbar radiculopathy. I recommended a course of physical therapy for the lumbar spine. A script was provided today. \par \par FU in 6 weeks. If her symptoms worsen we may obtain an MRI to further evaluate for HNP.	 \par \par All questions were answered, all alternatives discussed and the patient is in complete agreement with that plan. Follow-up appointment as instructed. Any issues and the patient will call or come in sooner.

## 2022-06-17 NOTE — END OF VISIT
[FreeTextEntry3] : All medical record entries made by the [facility] were at my, Dr. Juvenal Eid, direction and personally dictated by me on 06/17/2022. I have reviewed the chart and agree that the record accurately reflects my personal performance of the history, physical exam, assessment and plan. I have also personally directed, reviewed, and agreed with the chart.

## 2022-06-17 NOTE — ADDENDUM
Rodríguez Mckeon contacted pt back and advised her to go to labor and delivery. She stated she would   [FreeTextEntry1] : I, Mitesh Mckeon, acted solely as a scribe for Dr. Juvenal Eid on this date 06/17/2022.

## 2022-06-17 NOTE — HISTORY OF PRESENT ILLNESS
[de-identified] : Patient is a 52 year-old female who presents to the office today for initial evaluation of lower back pain. She has had this pain for about 1 year. She denies any injury or inciting event. She works in an office, does a lot of sitting. The pain is constant and located in the right lower aspect of the spine. The pain does not radiate often. She gets "sciatica" type symptoms that present from time to time. Currently, walking and bending are uncomfortable. When she walks 1/2 a block, she has to stop to allow the pain to cool down before proceeding. She does not take any medications due to history of hepatitis B and renal transplant (06/01/2020). She presents today with some tingling in the right foot when sciatic symptoms occur, but normally, no paresthesias. She denies bowel or bladder dysfunction. She denies saddle anesthesia. She is ambulating with a cane.

## 2022-06-17 NOTE — PHYSICAL EXAM
[Normal RLE] : Right Lower Extremity: No scars, rashes, lesions, ulcers, skin intact [Normal LLE] : Left Lower Extremity: No scars, rashes, lesions, ulcers, skin intact [Normal Touch] : sensation intact for touch [Normal] : Alert and in no acute distress [de-identified] : Lumbosacral Spine:\par Musculoskeletal Examination\par ¦ Inspection : no visible rash, no deformities\par ¦ Palpation : paraspinal musculature nontender, SI joint tenderness\par ¦ Stability : no subluxations present\par ¦ Range of Motion : unable to forward flexion, unable to right lateral flexion, minimal left lateral flexion\par ¦ Muscle Strength : paraspinal muscle strength and tone within normal limits\par ¦ Strength : hip flexion 5/5\par ¦ Muscle Tone : paraspinal muscle strength and tone within normal limits\par ¦ Tests/Signs : Straight Leg Raise Test (+ on right) . Patellar and Achilles Reflexes (2+) bilaterally.  [de-identified] : o Lumbosacral Spine : AP and lateral views were obtained, there are no soft tissue abnormalities, no fractures, alignment is normal, mild degenerative disc changes, mild anterior osteophyte formation, normal foraminae, normal bone density, no bony lesions.

## 2022-06-21 NOTE — H&P ADULT - PROBLEM SELECTOR PLAN 1
Lipid abnormalities stable.  Not on a statin.   - patient with shortness of breath. Patient's Xray concerning for pulmonary congestion, elevated BNP  - s/p 80 IV lasix  - continue BIPAP  - monitor symptoms - patient with shortness of breath. Patient's Xray concerning for pulmonary congestion, elevated BNP in the setting of hypertensive emergency  - s/p 80 IV lasix as patient still making urine  - continue BIPAP  - monitor symptoms

## 2022-07-14 NOTE — H&P ADULT - CARDIOVASCULAR
details… Regular rate & rhythm, normal S1, S2; no murmurs, gallops or rubs; no S3, S4 Ivermectin Counseling:  Patient instructed to take medication on an empty stomach with a full glass of water.  Patient informed of potential adverse effects including but not limited to nausea, diarrhea, dizziness, itching, and swelling of the extremities or lymph nodes.  The patient verbalized understanding of the proper use and possible adverse effects of ivermectin.  All of the patient's questions and concerns were addressed.

## 2022-08-01 ENCOUNTER — APPOINTMENT (OUTPATIENT)
Dept: ORTHOPEDIC SURGERY | Facility: CLINIC | Age: 52
End: 2022-08-01

## 2022-08-01 VITALS — BODY MASS INDEX: 32.65 KG/M2 | HEIGHT: 67 IN | WEIGHT: 208 LBS

## 2022-08-01 DIAGNOSIS — M54.16 RADICULOPATHY, LUMBAR REGION: ICD-10-CM

## 2022-08-01 PROCEDURE — 99214 OFFICE O/P EST MOD 30 MIN: CPT

## 2022-09-01 ENCOUNTER — APPOINTMENT (OUTPATIENT)
Dept: HEPATOLOGY | Facility: CLINIC | Age: 52
End: 2022-09-01

## 2022-09-28 ENCOUNTER — APPOINTMENT (OUTPATIENT)
Dept: HEPATOLOGY | Facility: CLINIC | Age: 52
End: 2022-09-28

## 2022-09-28 VITALS
WEIGHT: 204 LBS | HEIGHT: 67 IN | BODY MASS INDEX: 32.02 KG/M2 | RESPIRATION RATE: 16 BRPM | OXYGEN SATURATION: 98 % | HEART RATE: 67 BPM | TEMPERATURE: 97.6 F | DIASTOLIC BLOOD PRESSURE: 92 MMHG | SYSTOLIC BLOOD PRESSURE: 134 MMHG

## 2022-09-28 DIAGNOSIS — B18.1 CHRONIC VIRAL HEPATITIS B W/OUT DELTA-AGENT: ICD-10-CM

## 2022-09-28 DIAGNOSIS — K76.0 FATTY (CHANGE OF) LIVER, NOT ELSEWHERE CLASSIFIED: ICD-10-CM

## 2022-09-28 PROCEDURE — 99215 OFFICE O/P EST HI 40 MIN: CPT

## 2022-09-28 PROCEDURE — 91200 LIVER ELASTOGRAPHY: CPT

## 2022-09-28 RX ORDER — ENTECAVIR 0.5 MG/1
0.5 TABLET, FILM COATED ORAL DAILY
Qty: 90 | Refills: 3 | Status: DISCONTINUED | COMMUNITY
Start: 2020-08-27 | End: 2022-09-28

## 2022-09-29 PROBLEM — K76.0 FATTY LIVER: Status: ACTIVE | Noted: 2022-09-29

## 2022-09-29 LAB
25(OH)D3 SERPL-MCNC: 26.2 NG/ML
AFP-TM SERPL-MCNC: 2.4 NG/ML
ALBUMIN SERPL ELPH-MCNC: 4.6 G/DL
ALP BLD-CCNC: 66 U/L
ALT SERPL-CCNC: 15 U/L
ANION GAP SERPL CALC-SCNC: 11 MMOL/L
AST SERPL-CCNC: 14 U/L
BASOPHILS # BLD AUTO: 0.03 K/UL
BASOPHILS NFR BLD AUTO: 0.5 %
BILIRUB SERPL-MCNC: 1 MG/DL
BUN SERPL-MCNC: 9 MG/DL
CALCIUM SERPL-MCNC: 10.3 MG/DL
CHLORIDE SERPL-SCNC: 105 MMOL/L
CHOLEST SERPL-MCNC: 202 MG/DL
CO2 SERPL-SCNC: 23 MMOL/L
CREAT SERPL-MCNC: 0.92 MG/DL
EGFR: 76 ML/MIN/1.73M2
EOSINOPHIL # BLD AUTO: 0.3 K/UL
EOSINOPHIL NFR BLD AUTO: 5 %
ESTIMATED AVERAGE GLUCOSE: 97 MG/DL
HBA1C MFR BLD HPLC: 5 %
HBV DNA # SERPL NAA+PROBE: <10 IU/ML
HBV E AB SER QL: REACTIVE
HBV E AG SER QL: NONREACTIVE
HCT VFR BLD CALC: 51 %
HDLC SERPL-MCNC: 51 MG/DL
HEPB DNA PCR INT: DETECTED
HEPB DNA PCR LOG: <1 LOGIU/ML
HGB BLD-MCNC: 16.4 G/DL
IMM GRANULOCYTES NFR BLD AUTO: 0.3 %
INR PPP: 1.04 RATIO
LDLC SERPL CALC-MCNC: 121 MG/DL
LYMPHOCYTES # BLD AUTO: 0.94 K/UL
LYMPHOCYTES NFR BLD AUTO: 15.7 %
MAN DIFF?: NORMAL
MCHC RBC-ENTMCNC: 29.1 PG
MCHC RBC-ENTMCNC: 32.2 GM/DL
MCV RBC AUTO: 90.4 FL
MONOCYTES # BLD AUTO: 0.47 K/UL
MONOCYTES NFR BLD AUTO: 7.9 %
NEUTROPHILS # BLD AUTO: 4.22 K/UL
NEUTROPHILS NFR BLD AUTO: 70.6 %
NONHDLC SERPL-MCNC: 151 MG/DL
PLATELET # BLD AUTO: 229 K/UL
POTASSIUM SERPL-SCNC: 4.4 MMOL/L
PROT SERPL-MCNC: 7 G/DL
PT BLD: 12.1 SEC
RBC # BLD: 5.64 M/UL
RBC # FLD: 13.7 %
SODIUM SERPL-SCNC: 140 MMOL/L
TRIGL SERPL-MCNC: 151 MG/DL
WBC # FLD AUTO: 5.98 K/UL

## 2022-09-29 NOTE — ASSESSMENT
[FreeTextEntry1] : Ms. ROSEMARY GUSTAFSON is 52 year old female who is being seen for follow up visit with [facility] on entecavir 0.5 mg a day.  \par \par # [facility] panel: Detected HBV DNA < 10/1 log. Reactive to anti-HBe, HBsAg and anti-HBc; NR to HBeAg and anti- HBs, HBV DNA ND consistently since 06/24/2019, Detected HBV DNA 14830 (03/20/2019) and 189 (09/11/2017) She had been taking entecavir 0.5 mg every day.  I have discussed with her at length regarding the importance of taking this medication.  She is currently having it prescribed by her transplant physicians in [place].\par \par >> Fibrosis - Fibroscan 09/28/2022 shows F0-1 (E kPa) and S0 (CAP dB/m)\par >> HCC screening – AFP and US with no lesion in the Feb-Mar 2022 surveillance. She will need to have blood tests and a sonogram every 6 months.\par \par # Fatty Liver - US ab on 03/09/2022: Mild fatty infiltration of the liver. No distinct cystic or solid masses were seen. Although the Fibroscan 09/28/2022 shows S0 (CAP dB/m)\par + RF - Labs on 09/28/2022: WDL- A1C with elevated Tgl/Cho 151/202, , \par + Hb/Hct < 16.4/51 – Advised to f/u with PCP.\par  >> Insufficient Vit D 26.2 – Advised to take OTC daily supplements. \par \par # Immune to HAV (02/27/2017) NR - HCV and HEV.\par \par # P/history of nephrotic syndrome - Atrophic kidneys. Transplanted kidney was identified in the right lower quadrant in 03/2022 US ab. She underwent a kidney transplant at [facility] June 1, 2020 and appears to be doing well. Was +hepatitis B prior to her kidney transplant. SxH/o cholecystectomy.\par \par # Colonoscopy April 19, 2019 with multiple small and large amount of diverticuli in the sigmoid and descending colon.\par \par PLAN to F/U in 6 months with repeat laboratory tests and abdominal sonogram if there are no significant abnormalities on her tests ordered today.  \par Encouraged to call back in the interim with any issues or concerns so that we can address and assist as required.\par

## 2022-09-29 NOTE — HISTORY OF PRESENT ILLNESS
[FreeTextEntry1] : Ms. ROSEMARY GUSTAFSON is 52 year old female who is being seen for follow up visit with [facility] on entecavir 0.5 mg a day. She has no complaints at this time.  \par \par P/history of nephrotic syndrome.  She underwent a kidney transplant at [facility] June 1, 2020 and appears to be doing well. Was +hepatitis B prior to her kidney transplant. SxH/o cholecystectomy.\par \par Colonoscopy April 19, 2019 with multiple small and large amount of diverticuli in the sigmoid and descending colon\par \par Fibroscan 09/28/2022 shows F0-1 (E kPa) and S0 (CAP dB/m)\par Fibroscan June 14, 2019 with F0-1, S1 disease\par Fibroscan March 3, 2015 showed F0-1 disease.\par Immune to HAV (02/27/2017) NR - HCV and HEV. \par \par *HBV panel: Reactive to anti-HBe, HBsAg and anti-HBc; NR to HBeAg and anti- HBs, HBV DNA ND consistently since 06/24/2019, Detected HBV DNA 67675 (03/20/2019) and 189 (09/11/2017) \par \par Labs on 09/28/2022: WDL- AFP, CMP, INR, Plt 229, A1C with elevated Tgl/Cho 151/202, , Hb/Hct < 16.4/51, Insufficient Vit D 26.2. Detected HBV DNA < 10/1 log.\par US ab on 03/09/2022: Mild fatty infiltration of the liver. No distinct cystic or solid masses were seen. Atrophic kidneys. Transplanted kidney was identified in the right lower quadrant. \par \par Blood test June 24, 2021 ALT 13\par Blood tests August 20, 2020 ALT 11, AST 12, alkaline phosphatase 133, Prograf level 8.8.\par \par Blood tests March 20, 2019 HBV DNA 48514, ALT 12, AST 13, total bilirubin 0.4, albumin 4.0\par Abdominal sonogram April 2019 without lesions in the liver. Blood tests March 6, 2019 ALT 9. \par \par Blood tests October 2018 hepatitis B surface antigen positive, hepatitis B surface antibody negative, HBV DNA 5277.\par \par Laboratory tests October 14, 2017 creatinine 3.03, ALT 13, GFR 18. Blood tests September 2, 2017 ALT 16, AST 15\par \par Blood tests August 15, 2016 CBC with platelets unremarkable, ALT 22, creatinine 1.3, alpha-fetoprotein 2.9, hepatitis B E. antibody positive, hepatitis B E. antigen negative, and hepatitis B surface antigen positive, HBV DNA not detected. Abdominal sonogram September 8, 2016 without lesions in the liver.\par \par Blood tests March 2015 ALT 28, creatinine 1.1, alpha-fetoprotein 2.1, HBV DNA not detected, hepatitis B E antigen negative, hepatitis B E. antibody\par Blood tests from August 11, 2014 HBV DNA not detected, E. antigen negative, E. antibody positive, surface antigen positive, alpha-fetoprotein 2.2, creatinine 1.31, ALT 26, GFR 45, hemoglobin 11.8\par

## 2022-09-29 NOTE — PHYSICAL EXAM
[Non-Tender] : non-tender [Cognitive Mini-Mental Status Normal?] : Cognitive Mini Mental Status Exam is normal [General Appearance - Alert] : alert [General Appearance - In No Acute Distress] : in no acute distress [Sclera] : the sclera and conjunctiva were normal [PERRL With Normal Accommodation] : pupils were equal in size, round, and reactive to light [Extraocular Movements] : extraocular movements were intact [Outer Ear] : the ears and nose were normal in appearance [Oropharynx] : the oropharynx was normal [Neck Appearance] : the appearance of the neck was normal [Neck Cervical Mass (___cm)] : no neck mass was observed [Jugular Venous Distention Increased] : there was no jugular-venous distention [Thyroid Diffuse Enlargement] : the thyroid was not enlarged [Thyroid Nodule] : there were no palpable thyroid nodules [Auscultation Breath Sounds / Voice Sounds] : lungs were clear to auscultation bilaterally [Heart Rate And Rhythm] : heart rate was normal and rhythm regular [Heart Sounds] : normal S1 and S2 [Heart Sounds Gallop] : no gallops [Murmurs] : no murmurs [Heart Sounds Pericardial Friction Rub] : no pericardial rub [Edema] : there was no peripheral edema [Bowel Sounds] : normal bowel sounds [Abdomen Soft] : soft [Abdomen Tenderness] : non-tender [Abdomen Mass (___ Cm)] : no abdominal mass palpated [No CVA Tenderness] : no ~M costovertebral angle tenderness [No Spinal Tenderness] : no spinal tenderness [Abnormal Walk] : normal gait [Nail Clubbing] : no clubbing  or cyanosis of the fingernails [Musculoskeletal - Swelling] : no joint swelling seen [Motor Tone] : muscle strength and tone were normal [Skin Color & Pigmentation] : normal skin color and pigmentation [Skin Turgor] : normal skin turgor [] : no rash [Deep Tendon Reflexes (DTR)] : deep tendon reflexes were 2+ and symmetric [Sensation] : the sensory exam was normal to light touch and pinprick [No Focal Deficits] : no focal deficits [Oriented To Time, Place, And Person] : oriented to person, place, and time [Impaired Insight] : insight and judgment were intact [Affect] : the affect was normal [Cervical Lymph Nodes Enlarged Posterior Bilaterally] : posterior cervical [Supraclavicular Lymph Nodes Enlarged Bilaterally] : supraclavicular [Scleral Icterus] : No Scleral Icterus [Hepatojugular Reflux] : patient did not have a sustained hepatojugular reflux [Abdominal Bruit] : no abdominal bruit [Abdominal  Ascites] : no ascites [Splenomegaly] : no splenomegaly [Asterixis] : no asterixis observed [Jaundice] : No jaundice [Palmar Erythema] : no Palmar Erythema [Depression] : no depression [FreeTextEntry1] : REFUSED

## 2022-10-03 ENCOUNTER — APPOINTMENT (OUTPATIENT)
Dept: ORTHOPEDIC SURGERY | Facility: CLINIC | Age: 52
End: 2022-10-03

## 2022-10-30 ENCOUNTER — APPOINTMENT (OUTPATIENT)
Dept: ULTRASOUND IMAGING | Facility: IMAGING CENTER | Age: 52
End: 2022-10-30

## 2022-10-30 ENCOUNTER — OUTPATIENT (OUTPATIENT)
Dept: OUTPATIENT SERVICES | Facility: HOSPITAL | Age: 52
LOS: 1 days | End: 2022-10-30
Payer: COMMERCIAL

## 2022-10-30 DIAGNOSIS — Z00.8 ENCOUNTER FOR OTHER GENERAL EXAMINATION: ICD-10-CM

## 2022-10-30 DIAGNOSIS — B18.1 CHRONIC VIRAL HEPATITIS B WITHOUT DELTA-AGENT: ICD-10-CM

## 2022-10-30 PROCEDURE — 76700 US EXAM ABDOM COMPLETE: CPT

## 2022-10-30 PROCEDURE — 76700 US EXAM ABDOM COMPLETE: CPT | Mod: 26

## 2023-01-29 NOTE — ED ADULT NURSE NOTE - CAS ELECT INFOMATION PROVIDED
Problem: At Risk for Falls  Goal: # Patient does not fall  Outcome: Outcome Met, Continue evaluating goal progress toward completion  Goal: # Takes action to control fall-related risks  Outcome: Outcome Met, Continue evaluating goal progress toward completion  Goal: # Verbalizes understanding of fall risk/precautions  Description: Document education using the patient education activity  Outcome: Outcome Met, Continue evaluating goal progress toward completion     Problem: At Risk for Injury Due to Fall  Goal: # Patient does not fall  Outcome: Outcome Met, Continue evaluating goal progress toward completion  Goal: # Takes action to control condition specific risks  Outcome: Outcome Met, Continue evaluating goal progress toward completion  Goal: # Verbalizes understanding of fall-related injury personal risks  Description: Document education using the patient education activity  Outcome: Outcome Met, Continue evaluating goal progress toward completion     Problem: Impaired Physical Mobility  Goal: # Bed mobility, ambulation, and ADLs are maintained or returned to baseline during hospitalization  Outcome: Outcome Met, Continue evaluating goal progress toward completion     Problem: Urinary Retention  Goal: Urinary retention is resolved/improved  Outcome: Outcome Met, Continue evaluating goal progress toward completion  Goal: Verbalizes understanding of voiding strategies for urinary retention  Description: Document on Patient Education Activity  Outcome: Outcome Met, Continue evaluating goal progress toward completion     Problem: Pain  Goal: #Acceptable pain level achieved/maintained at rest using NRS/Faces  Description: This goal is used for patients who can self-report.  Acceptable means the level is at or below the identified comfort/function goal.  Outcome: Outcome Met, Continue evaluating goal progress toward completion  Goal: # Acceptable pain level achieved/maintained at rest using NRS/Faces without  oversedation (opioid naive or PCA/Epidural infusion)  Description: This goal is used if Opioid-naïve or on PCA/Epidural Infusion.  Outcome: Outcome Met, Continue evaluating goal progress toward completion  Goal: # Acceptable pain level achieved/maintained with activity using NRS/Faces  Description: This goal is used for patients who can self-report and are not achieving acceptable pain control during activity.  Outcome: Outcome Met, Continue evaluating goal progress toward completion  Goal: # Verbalizes understanding of pain management  Description: Documented in Patient Education Activity  Outcome: Outcome Met, Continue evaluating goal progress toward completion      DC instructions

## 2023-03-29 ENCOUNTER — APPOINTMENT (OUTPATIENT)
Dept: HEPATOLOGY | Facility: CLINIC | Age: 53
End: 2023-03-29

## 2023-04-03 NOTE — DISCHARGE NOTE ADULT - PRINCIPAL DIAGNOSIS
CKD (chronic kidney disease), stage V
Class II - visualization of the soft palate, fauces, and uvula

## 2023-08-30 VITALS
DIASTOLIC BLOOD PRESSURE: 98 MMHG | BODY MASS INDEX: 30.61 KG/M2 | WEIGHT: 195 LBS | SYSTOLIC BLOOD PRESSURE: 134 MMHG | HEIGHT: 67 IN | HEART RATE: 70 BPM

## 2023-12-16 ENCOUNTER — NON-APPOINTMENT (OUTPATIENT)
Age: 53
End: 2023-12-16

## 2024-01-12 ENCOUNTER — NON-APPOINTMENT (OUTPATIENT)
Age: 54
End: 2024-01-12

## 2024-01-12 DIAGNOSIS — Z92.89 PERSONAL HISTORY OF OTHER MEDICAL TREATMENT: ICD-10-CM

## 2024-01-12 DIAGNOSIS — N95.1 MENOPAUSAL AND FEMALE CLIMACTERIC STATES: ICD-10-CM

## 2024-01-12 DIAGNOSIS — R10.2 PELVIC AND PERINEAL PAIN: ICD-10-CM

## 2024-01-12 DIAGNOSIS — Z78.9 OTHER SPECIFIED HEALTH STATUS: ICD-10-CM

## 2024-02-17 NOTE — DISCHARGE NOTE PROVIDER - NSDCCPCAREPLAN_GEN_ALL_CORE_FT
PRINCIPAL DISCHARGE DIAGNOSIS  Diagnosis: Hypervolemia  Assessment and Plan of Treatment: You were admitted for shortness of breath due to fluid overload. You were treated with BIPAP and IV Lasix in the ED and dialysis. Your shortness of breath resolved prior to dialysis and you no longer required BIPAP. Dialysis assisted in removing excess fluid. Please follow up with your outpatient nephrologist within one week.      SECONDARY DISCHARGE DIAGNOSES  Diagnosis: Hepatitis B surface antigen positive  Assessment and Plan of Treatment: You are known to have active hepatitis B. Your surface antigen was found to be positive. Please follow up with your outpatient hepatologist.    Diagnosis: Hypertensive emergency  Assessment and Plan of Treatment: You were admitted with systolic blood pressure in the 200s. You were treated with two doses of hydralazine in the ED, and you started on your home dose of labetalol and treated with dialysis. Please follow up with your outpatient nephrologist within one week.    Diagnosis: ESRD (end stage renal disease) on dialysis  Assessment and Plan of Treatment: You presented with hypervolemia and elevated blood pressure as a result of your ESRD. You received dialysis while admitted. Please continue your dialysis treatments and follow up with your outpatient nephrologist. None

## 2024-02-26 ENCOUNTER — APPOINTMENT (OUTPATIENT)
Dept: OBGYN | Facility: CLINIC | Age: 54
End: 2024-02-26

## 2024-07-03 ENCOUNTER — APPOINTMENT (OUTPATIENT)
Dept: OBGYN | Facility: CLINIC | Age: 54
End: 2024-07-03

## 2024-10-10 NOTE — PATIENT PROFILE ADULT - ANY SIGNIFICANT CHANGE IN ABILITY TO PERFORM THE FOLLOWING ADL SINCE THE ONSET OF PRESENT ILLNESS?
Subjective:     Meera Cordova     Justin Anaya MD;  Location: [facility] OR [address] [place] [address];  [employer] [place] + Tongs + Pads;  Surgeon: Taqueria Draper MD;  Location: [facility] OR [address] [place] [address];  Justin Anaya MD;  [facility];  Aimee     Cancer Sister Aimee         breast    HIV Sister Traci     No Known Problems Daughter Danuta     No Known Problems Daughter Christina     Lung cancer Brother      No Known Problems Brother Rudy     No Known Problems Brother Darius     No Known Problems Son Sid     [place] and one lives in [place].    [id number]::"Neutral"}   C2 {OMTcervical:41852::"Neutral"}   C3 {OMTcervical:98263::"Neutral"}   C4 {OMTcervical:36805::"Neutral"}   C5 {OMTcervical:57453::"Neutral"}   C6 {OMTcervical:23981::"Neutral"}   C7 {OMTcervical:02033::"Neutral"}      Thoracic Spine   T1 {OMTT/L:72760::"Neutral"}   T2 {OMTT/L:33148::"Neutral"}   T3 {OMTT/L:78261::"Neutral"}   T4 {OMTT/L:43894::"Neutral"}   T5 {OMTT/L:63751::"Neutral"}   T6 {OMTT/L:60911::"Neutral"}   T7 {OMTT/L:83941::"Neutral"}   T8 {OMTT/L:98783::"Neutral"}   T9 {OMTT/L:71147::"Neutral"}   T10 {OMTT/L:93484::"Neutral"}   T11 {OMTT/L:07103::"Neutral"}   T12 {OMTT/L:12923::"Neutral"}     Rib cage: Neutral     Lumbar Spine   L1 {OMTT/L:05451::"Neutral"}   L2 {OMTT/L:39876::"Neutral"}   L3 {OMTT/L:64498::"Neutral"}   L4 {OMTT/L:08940::"Neutral"}   L5 {OMTT/L:96277::"Neutral"}     Pelvis:  Innominate:{OMTinnom:34317}  Pubic bone:{OMTpub:83797}    Sacrum:{OMTsacrum:58366::"Neutral"}    Key   no

## 2024-11-30 ENCOUNTER — EMERGENCY (EMERGENCY)
Facility: HOSPITAL | Age: 54
LOS: 1 days | Discharge: HOME | End: 2024-11-30
Attending: EMERGENCY MEDICINE
Payer: COMMERCIAL

## 2024-11-30 VITALS
SYSTOLIC BLOOD PRESSURE: 108 MMHG | TEMPERATURE: 98 F | DIASTOLIC BLOOD PRESSURE: 74 MMHG | OXYGEN SATURATION: 98 % | HEART RATE: 68 BPM | RESPIRATION RATE: 16 BRPM

## 2024-11-30 VITALS
SYSTOLIC BLOOD PRESSURE: 128 MMHG | TEMPERATURE: 97 F | HEART RATE: 106 BPM | HEIGHT: 67 IN | DIASTOLIC BLOOD PRESSURE: 87 MMHG | RESPIRATION RATE: 18 BRPM | WEIGHT: 195.99 LBS | OXYGEN SATURATION: 97 %

## 2024-11-30 LAB
ALBUMIN SERPL ELPH-MCNC: 4.2 G/DL — SIGNIFICANT CHANGE UP (ref 3.3–5)
ALP SERPL-CCNC: 71 U/L — SIGNIFICANT CHANGE UP (ref 40–120)
ALT FLD-CCNC: 21 U/L — SIGNIFICANT CHANGE UP (ref 10–45)
ANION GAP SERPL CALC-SCNC: 13 MMOL/L — SIGNIFICANT CHANGE UP (ref 5–17)
APTT BLD: 34.7 SEC — SIGNIFICANT CHANGE UP (ref 24.5–35.6)
AST SERPL-CCNC: 17 U/L — SIGNIFICANT CHANGE UP (ref 10–40)
BASOPHILS # BLD AUTO: 0.03 K/UL — SIGNIFICANT CHANGE UP (ref 0–0.2)
BASOPHILS NFR BLD AUTO: 0.5 % — SIGNIFICANT CHANGE UP (ref 0–2)
BILIRUB SERPL-MCNC: 0.8 MG/DL — SIGNIFICANT CHANGE UP (ref 0.2–1.2)
BUN SERPL-MCNC: 16 MG/DL — SIGNIFICANT CHANGE UP (ref 7–23)
CALCIUM SERPL-MCNC: 10 MG/DL — SIGNIFICANT CHANGE UP (ref 8.4–10.5)
CHLORIDE SERPL-SCNC: 106 MMOL/L — SIGNIFICANT CHANGE UP (ref 96–108)
CO2 SERPL-SCNC: 21 MMOL/L — LOW (ref 22–31)
CREAT SERPL-MCNC: 0.78 MG/DL — SIGNIFICANT CHANGE UP (ref 0.5–1.3)
EGFR: 91 ML/MIN/1.73M2 — SIGNIFICANT CHANGE UP
EOSINOPHIL # BLD AUTO: 0.11 K/UL — SIGNIFICANT CHANGE UP (ref 0–0.5)
EOSINOPHIL NFR BLD AUTO: 1.9 % — SIGNIFICANT CHANGE UP (ref 0–6)
GLUCOSE SERPL-MCNC: 117 MG/DL — HIGH (ref 70–99)
HCT VFR BLD CALC: 44.6 % — SIGNIFICANT CHANGE UP (ref 34.5–45)
HGB BLD-MCNC: 14.5 G/DL — SIGNIFICANT CHANGE UP (ref 11.5–15.5)
IMM GRANULOCYTES NFR BLD AUTO: 0.9 % — SIGNIFICANT CHANGE UP (ref 0–0.9)
INR BLD: 0.99 RATIO — SIGNIFICANT CHANGE UP (ref 0.85–1.16)
LYMPHOCYTES # BLD AUTO: 1.27 K/UL — SIGNIFICANT CHANGE UP (ref 1–3.3)
LYMPHOCYTES # BLD AUTO: 21.6 % — SIGNIFICANT CHANGE UP (ref 13–44)
MAGNESIUM SERPL-MCNC: 1.8 MG/DL — SIGNIFICANT CHANGE UP (ref 1.6–2.6)
MCHC RBC-ENTMCNC: 29.4 PG — SIGNIFICANT CHANGE UP (ref 27–34)
MCHC RBC-ENTMCNC: 32.5 G/DL — SIGNIFICANT CHANGE UP (ref 32–36)
MCV RBC AUTO: 90.5 FL — SIGNIFICANT CHANGE UP (ref 80–100)
MONOCYTES # BLD AUTO: 0.5 K/UL — SIGNIFICANT CHANGE UP (ref 0–0.9)
MONOCYTES NFR BLD AUTO: 8.5 % — SIGNIFICANT CHANGE UP (ref 2–14)
NEUTROPHILS # BLD AUTO: 3.92 K/UL — SIGNIFICANT CHANGE UP (ref 1.8–7.4)
NEUTROPHILS NFR BLD AUTO: 66.6 % — SIGNIFICANT CHANGE UP (ref 43–77)
NRBC # BLD: 0 /100 WBCS — SIGNIFICANT CHANGE UP (ref 0–0)
PHOSPHATE SERPL-MCNC: 1.7 MG/DL — LOW (ref 2.5–4.5)
PLATELET # BLD AUTO: 217 K/UL — SIGNIFICANT CHANGE UP (ref 150–400)
POTASSIUM SERPL-MCNC: 4 MMOL/L — SIGNIFICANT CHANGE UP (ref 3.5–5.3)
POTASSIUM SERPL-SCNC: 4 MMOL/L — SIGNIFICANT CHANGE UP (ref 3.5–5.3)
PROT SERPL-MCNC: 7 G/DL — SIGNIFICANT CHANGE UP (ref 6–8.3)
PROTHROM AB SERPL-ACNC: 11.3 SEC — SIGNIFICANT CHANGE UP (ref 9.9–13.4)
RBC # BLD: 4.93 M/UL — SIGNIFICANT CHANGE UP (ref 3.8–5.2)
RBC # FLD: 13.2 % — SIGNIFICANT CHANGE UP (ref 10.3–14.5)
SODIUM SERPL-SCNC: 140 MMOL/L — SIGNIFICANT CHANGE UP (ref 135–145)
WBC # BLD: 5.88 K/UL — SIGNIFICANT CHANGE UP (ref 3.8–10.5)
WBC # FLD AUTO: 5.88 K/UL — SIGNIFICANT CHANGE UP (ref 3.8–10.5)

## 2024-11-30 PROCEDURE — 86850 RBC ANTIBODY SCREEN: CPT

## 2024-11-30 PROCEDURE — 85025 COMPLETE CBC W/AUTO DIFF WBC: CPT

## 2024-11-30 PROCEDURE — 83735 ASSAY OF MAGNESIUM: CPT

## 2024-11-30 PROCEDURE — 96375 TX/PRO/DX INJ NEW DRUG ADDON: CPT

## 2024-11-30 PROCEDURE — 86901 BLOOD TYPING SEROLOGIC RH(D): CPT

## 2024-11-30 PROCEDURE — 72148 MRI LUMBAR SPINE W/O DYE: CPT | Mod: 26,MC

## 2024-11-30 PROCEDURE — 85610 PROTHROMBIN TIME: CPT

## 2024-11-30 PROCEDURE — 84100 ASSAY OF PHOSPHORUS: CPT

## 2024-11-30 PROCEDURE — 80053 COMPREHEN METABOLIC PANEL: CPT

## 2024-11-30 PROCEDURE — 96374 THER/PROPH/DIAG INJ IV PUSH: CPT

## 2024-11-30 PROCEDURE — 85730 THROMBOPLASTIN TIME PARTIAL: CPT

## 2024-11-30 PROCEDURE — 72148 MRI LUMBAR SPINE W/O DYE: CPT | Mod: MC

## 2024-11-30 PROCEDURE — 99285 EMERGENCY DEPT VISIT HI MDM: CPT

## 2024-11-30 PROCEDURE — 86900 BLOOD TYPING SEROLOGIC ABO: CPT

## 2024-11-30 PROCEDURE — 99285 EMERGENCY DEPT VISIT HI MDM: CPT | Mod: 25

## 2024-11-30 RX ORDER — DIAZEPAM 10 MG/1
2 TABLET ORAL ONCE
Refills: 0 | Status: DISCONTINUED | OUTPATIENT
Start: 2024-11-30 | End: 2024-11-30

## 2024-11-30 RX ORDER — SODIUM,POTASSIUM PHOSPHATES 278-250MG
1 POWDER IN PACKET (EA) ORAL ONCE
Refills: 0 | Status: DISCONTINUED | OUTPATIENT
Start: 2024-11-30 | End: 2024-12-04

## 2024-11-30 RX ORDER — DEXAMETHASONE 1.5 MG/1
10 TABLET ORAL ONCE
Refills: 0 | Status: COMPLETED | OUTPATIENT
Start: 2024-11-30 | End: 2024-11-30

## 2024-11-30 RX ORDER — LIDOCAINE 40 MG/G
1 CREAM TOPICAL ONCE
Refills: 0 | Status: COMPLETED | OUTPATIENT
Start: 2024-11-30 | End: 2024-11-30

## 2024-11-30 RX ORDER — ACETAMINOPHEN 500MG 500 MG/1
2 TABLET, COATED ORAL
Qty: 112 | Refills: 0
Start: 2024-11-30 | End: 2024-12-13

## 2024-11-30 RX ORDER — 0.9 % SODIUM CHLORIDE 0.9 %
1000 INTRAVENOUS SOLUTION INTRAVENOUS
Refills: 0 | Status: DISCONTINUED | OUTPATIENT
Start: 2024-11-30 | End: 2024-12-04

## 2024-11-30 RX ORDER — GABAPENTIN 300 MG/1
1 CAPSULE ORAL
Qty: 42 | Refills: 0
Start: 2024-11-30 | End: 2024-12-13

## 2024-11-30 RX ORDER — ACETAMINOPHEN 500MG 500 MG/1
1000 TABLET, COATED ORAL ONCE
Refills: 0 | Status: COMPLETED | OUTPATIENT
Start: 2024-11-30 | End: 2024-11-30

## 2024-11-30 RX ORDER — GABAPENTIN 300 MG/1
100 CAPSULE ORAL ONCE
Refills: 0 | Status: COMPLETED | OUTPATIENT
Start: 2024-11-30 | End: 2024-11-30

## 2024-11-30 RX ORDER — OXYCODONE HYDROCHLORIDE 30 MG/1
1 TABLET ORAL
Qty: 12 | Refills: 0
Start: 2024-11-30 | End: 2024-12-02

## 2024-11-30 RX ORDER — METHYLPREDNISOLONE SOD SUCC 125 MG
1 VIAL (EA) INJECTION
Qty: 1 | Refills: 0
Start: 2024-11-30 | End: 2024-11-30

## 2024-11-30 RX ADMIN — DIAZEPAM 2 MILLIGRAM(S): 10 TABLET ORAL at 18:14

## 2024-11-30 RX ADMIN — Medication 150 MILLILITER(S): at 15:10

## 2024-11-30 RX ADMIN — GABAPENTIN 100 MILLIGRAM(S): 300 CAPSULE ORAL at 15:06

## 2024-11-30 RX ADMIN — DEXAMETHASONE 100 MILLIGRAM(S): 1.5 TABLET ORAL at 19:43

## 2024-11-30 RX ADMIN — ACETAMINOPHEN 500MG 400 MILLIGRAM(S): 500 TABLET, COATED ORAL at 15:07

## 2024-11-30 NOTE — ED PROVIDER NOTE - NSFOLLOWUPINSTRUCTIONS_ED_ALL_ED_FT
You were evaluated in the Emergency Department today for your back pain.  You received an MRI in the ED, which showed degenerative disease of your back. You were seen by our neurosurgeons, who recommend a new pain regimen and to follow up with Dr. Finley.    Please schedule an appointment for follow-up with Dr. Finley in 1-2 weeks.    Return to the Emergency Department if you experience worsening back pain, difficulty walking, fevers, numbness, tingling, incontinence, or any other concerning symptoms.

## 2024-11-30 NOTE — CONSULT NOTE ADULT - ASSESSMENT
- Pain: Tylenol, Oxy, Gabapentin (No NSAIDs)  - MR L spine w/o contrast  - Dispo pending MR L spine (radiology and MRI aware, MRI form completed) - Please coordinate as soon as possible  - Do not consult ortho for spine
No acute neurosurgical intervention  Give 10 dex now  D/c home on Tylenol, Oxy, Gabapentin (100 TID)  O/p f/u w/ Dr. Finley in 1-2 weeks

## 2024-11-30 NOTE — ED PROVIDER NOTE - PATIENT PORTAL LINK FT
You can access the FollowMyHealth Patient Portal offered by [facility] by registering at the following website: [email or web address]. By joining [employer]’s FollowMyHealth portal, you will also be able to view your health information using other applications (apps) compatible with our system.

## 2024-11-30 NOTE — ED PROVIDER NOTE - PHYSICAL EXAMINATION
Gen: in severe pain.   HEENT: Pupils reactive, EOMI, no nasal discharge, mucous membranes moist  CV: RRR, +S1/S2, no M/R/G, 2+ radial pulses b/l  Resp: CTAB, no W/R/R, no accessory muscle use, no increased work of breathing  GI: Abdomen soft non-distended, No tenderness to palpation.   MSK: Midline lumbar spinal tenderness. Positive straight leg test bilaterally but worse on the left. No open wounds, no bruising, no LE edema  Neuro: A&Ox4, following commands, moving all four extremities spontaneously. Sensations and pulses intact in bilateral lower extremities.   Psych: appropriate mood Gen: in severe pain   HEENT: Pupils reactive, EOMI, no nasal discharge, mucous membranes moist  CV: RRR, +S1/S2, no M/R/G, 2+ radial pulses b/l  Resp: CTAB, no W/R/R, no accessory muscle use, no increased work of breathing  GI: Abdomen soft non-distended, No tenderness to palpation.   MSK: Midline lumbar spinal tenderness. Positive straight leg test bilaterally but worse on the left. No open wounds, no bruising, no LE edema  Neuro: A&Ox4, following commands, moving all four extremities spontaneously. Sensations and pulses intact in bilateral lower extremities.   Psych: appropriate mood

## 2024-11-30 NOTE — ED PROVIDER NOTE - CLINICAL SUMMARY MEDICAL DECISION MAKING FREE TEXT BOX
see MD Note see MD Note    54-year-old female with past medical history of renal transplant presents emergency department due to back pain. Pain radiates down legs. Patient afebrile and no saddle anesthesia or incontinence. Positive straight leg tests bilaterally.   Neurosurgery requested MRI. Will get MRI and basic labs. WIll work to control pain. Will not use NSAIDs s/p kidney transplant in 2020.   WIll reassess patient.

## 2024-11-30 NOTE — ED ADULT NURSE NOTE - OBJECTIVE STATEMENT
54 year old female, A&OX4, PMH Scatica, kidney transplant presenting to ED complaining of severe back pain. Patient had xray done last week     b/l sciatica pain started 2 days ago hx kidney transplant x-ray done last week told she has a pinched nerve sent to PT and thinks that aggravated the nerve pain 54 year old female, A&OX4, PMH Scatica, kidney transplant presenting to ED complaining of severe back pain. Patient had xray done last week and was sent to PT, Feels that PT made symptoms worse.

## 2024-11-30 NOTE — ED PROVIDER NOTE - OBJECTIVE STATEMENT
54-year-old female with past medical history of renal transplant presents emergency department due to back pain.  Patient transferred from 1 section of the ED to the section of the ED after neurosurgery requested MRI imaging.  Patient has been having back pain radiating down to the legs for approximately the past 2 months.  Pain is started to get worse since Tuesday, unsure why.  No inciting injury to the back or legs.  Patient was unable to walk earlier today.  She had an x-ray on Saturday of last week to evaluate further and was told that she may have a pinched nerve and radiculopathy.  Denies fever, chills, vomiting, abdominal pain, headache, vision changes, saddle anesthesia, incontinence.

## 2024-11-30 NOTE — ED PROVIDER NOTE - PROGRESS NOTE DETAILS
Ara Simmons, PGY-3: Patient not yet seen by ED team, however was expected by neurosurgery team as she is known by neurosurgery attending. Patient currently in waiting room.  Discussed case with neurosurgery resident Dr. Tutu Pringle.  Patient is here for sciatic pain and lower back pain. Neurosurgery team requesting MRI of the lumbar spine for patient. MRI ordered by neurosurgery.  Waiting for patient to be placed in room for assessment/history taking. Ara Simmons, PGY-3: patient slotted for another section. will be evaluated by another ED team Ara Simmons, PGY-3: Patient not yet seen by ED team, however was expected by neurosurgery team as she is known by neurosurgery attending. Patient currently in waiting room.  Discussed case with neurosurgery resident Dr. Tutu Pringle.  Patient is here for sciatic pain and lower back pain. Neurosurgery team requesting MRI of the lumbar spine for patient. MRI ordered by neurosurgery, who will also discuss with radiology to coordinate MRI.  Waiting for patient to be placed in room for assessment/history taking. Patient received MRI and requesting more pain control now. Offered valium but patient declining for something else.  MRI shows mild degenerative disc disease from L3-S1 without spinal stenosis or disc herniation. MD Saida (PGY-3) Received sign out on patient. In brief, 55-year-old female with past medical history of idiopathic renal failure, status post transplant 2020, presenting with lower back pain with bilateral sciatica.  Patient had MRI.  MRI with degenerative disease.  Patient followed by neurosurgery.  Neurosurgery recommending administration of 10 of Decadron right now.  Plan to be discharged on Tylenol, oxy, gabapentin.  Patient to follow-up with Dr. Finley.  Discussed with pt results of work up, strict return precautions, and need for follow up.  Pt expressed understanding and agrees with plan...

## 2024-11-30 NOTE — ED PROVIDER NOTE - ATTENDING CONTRIBUTION TO CARE
------------ATTENDING NOTE------------  pt c/o low back pain w/ bilateral sciatica, L > R, worse w/ walking, no trauma, no incontinence/retention, sent to ED by [facility] for MRI, complicated by renal transpalnt, no fevers, ED sign out 1500 pending labs/imaging and consults for tx/dispo decisions.  - Mj Nguyen MD   ---------------------------------------------------------

## 2024-11-30 NOTE — CONSULT NOTE ADULT - SUBJECTIVE AND OBJECTIVE BOX
52F, no AC/AP, PMH idiopathic renal failure, s/p kidney transplant 2020, distant hx endometreosis s/p hysterecotmy, gallbladder rxn pw LBP bl radic (L>R) x1wk on 2mo. Symptoms worsened after working w/ PT 1wk ago. Pt cannot tolerate ambulating 2/2 pain. Only tried oxy for pain. No bladder/ bowel sxs. MR L-spine w L3/L4 paracentral disc, near traversing L4 root. Exam, pain limited, though AOx3, BUE 5/5, BLE 5/5, including DF/PF, SILT.   
Aminova, Aranza  52F, no AC/AP, PMH idiopathic renal failure, s/p kidney transplant 2020, distant hx endometreosis s/p hysterecotmy, gallbladder rxn pw LBP bl radic (L>R) x1wk on 2mo. Symptoms worsened after working w/ PT 1wk ago. Pt cannot tolerate ambulating 2/2 pain. Has not tried gabapentin. No bladder/ bowel sxs. Exam, pain limited, though AOx3, BUE 5/5, BLE 5/5, including DF/PF, SILT.

## 2024-11-30 NOTE — ED ADULT TRIAGE NOTE - CHIEF COMPLAINT QUOTE
b/l sciatica pain started 2 days ago hx kidney transplant x-ray done last week told she has a pinched nerve sent to PT and thinks that aggravated the nerve pain

## 2024-12-04 ENCOUNTER — APPOINTMENT (OUTPATIENT)
Dept: NEUROSURGERY | Facility: CLINIC | Age: 54
End: 2024-12-04
Payer: COMMERCIAL

## 2024-12-04 ENCOUNTER — NON-APPOINTMENT (OUTPATIENT)
Age: 54
End: 2024-12-04

## 2024-12-04 VITALS
RESPIRATION RATE: 17 BRPM | BODY MASS INDEX: 30.61 KG/M2 | HEART RATE: 63 BPM | DIASTOLIC BLOOD PRESSURE: 91 MMHG | WEIGHT: 195 LBS | SYSTOLIC BLOOD PRESSURE: 143 MMHG | OXYGEN SATURATION: 99 % | HEIGHT: 67 IN

## 2024-12-04 DIAGNOSIS — M54.16 RADICULOPATHY, LUMBAR REGION: ICD-10-CM

## 2024-12-04 DIAGNOSIS — M51.26 OTHER INTERVERTEBRAL DISC DISPLACEMENT, LUMBAR REGION: ICD-10-CM

## 2024-12-04 PROCEDURE — 99203 OFFICE O/P NEW LOW 30 MIN: CPT

## 2024-12-04 RX ORDER — PREGABALIN 75 MG/1
75 CAPSULE ORAL EVERY 8 HOURS
Qty: 90 | Refills: 1 | Status: ACTIVE | COMMUNITY
Start: 2024-12-04 | End: 1900-01-01

## 2024-12-04 RX ORDER — DEXAMETHASONE 4 MG/1
4 TABLET ORAL
Qty: 20 | Refills: 0 | Status: ACTIVE | COMMUNITY
Start: 2024-12-04 | End: 1900-01-01

## 2024-12-04 RX ORDER — PANTOPRAZOLE 40 MG/1
40 TABLET, DELAYED RELEASE ORAL DAILY
Qty: 30 | Refills: 0 | Status: ACTIVE | COMMUNITY
Start: 2024-12-04 | End: 1900-01-01

## 2024-12-12 NOTE — PATIENT PROFILE ADULT - NSPROEDALEARNPREF_GEN_A_NUR
L anterior hip precautions/fall precautions
individual instruction/skill demonstration/verbal instruction

## 2025-01-14 ENCOUNTER — NON-APPOINTMENT (OUTPATIENT)
Age: 55
End: 2025-01-14

## 2025-01-14 ENCOUNTER — APPOINTMENT (OUTPATIENT)
Dept: NEUROSURGERY | Facility: CLINIC | Age: 55
End: 2025-01-14
Payer: COMMERCIAL

## 2025-01-14 VITALS
BODY MASS INDEX: 30.76 KG/M2 | HEIGHT: 67 IN | HEART RATE: 75 BPM | WEIGHT: 196 LBS | OXYGEN SATURATION: 96 % | DIASTOLIC BLOOD PRESSURE: 85 MMHG | SYSTOLIC BLOOD PRESSURE: 126 MMHG

## 2025-01-14 DIAGNOSIS — M54.16 RADICULOPATHY, LUMBAR REGION: ICD-10-CM

## 2025-01-14 PROCEDURE — 99212 OFFICE O/P EST SF 10 MIN: CPT

## 2025-01-18 NOTE — PATIENT PROFILE ADULT - NUMBER OF YRS
ADULT SWALLOWING EVALUATION    ASSESSMENT    ASSESSMENT/OVERALL IMPRESSION:      Proper PPE worn. Hands sanitized upon entrance/exit Pt room.       BSE ordered 2/2 stroke protocol. Pt admitted 1/17/25 with cerebellar stroke. Pt on solid/thin liquid diet at home with dtr. PMH includes DM, HTN, COPD. [facility]. Pt denies any past swallowing difficulty. Current diet solid/thin liquids.     MRI Brain 1/18/25:  CONCLUSION:    Limited 3-sequence stroke protocol study was performed. Within these parameters:      1. Acute to early subacute lacunar infarctions in the left cerebellar hemisphere.   2. Stable postoperative changes from a remote frontal craniotomy and anterior parafalcine aneurysm clipping.   3. Stable mild chronic microangiopathic ischemic changes.         CXR 1/17/25:  CONCLUSION:   1. Unchanged chronic fibrotic changes throughout the lungs.   2. Cardiomegaly.         Pt alert, on room air, afebrile and assessed sitting upright in bed (after consulting with RN). Dtr present. Pt cooperative. Learning preference verbal. No verbal or non-verbal indication of pain. Vocal quality/intensity adequate. Volitional swallow and cough present; considered functional. Facial symmetry at rest. Oral motor exam unremarkable. Functional dentition. Pt self-fed solid and thin liquid trials. Bilabial seal adequate; no anterior loss. Lingual skills adequate for bolus formation, preparation and control of all consistencies. Bite reflex of solid functional in strength. Rotary, coordinated mastication skills. Oral cavity clear post swallows.     Pharyngeal response appeared timely per hyolaryngeal elevation to completion (considered functional in strength/rise to palpation). No overt CSA on solid nor thin liquids, including chain swallows of thin liquids via straw. Overall, swallow function appeared coordinated. Sp02 ~94% during this assessment.        IMPRESSIONS:    Pt presents with clinically functional oral and  pharyngeal swallowing skills. Collaborated with RN regarding Pt's swallowing plan of care. BSE results/recommendations discussed with Pt/family. Pt/family v/u/fair understanding; would benefit from additional reinforcement. Swallowing precautions written on white board in room for reinforcement/carry-over of skills for Pt, family and staff. Call light within Pt's reach upon SLP discharge from room.          PLAN: No skilled swallowing intervention required secondary to swallowing skills WNL. To f/u with [facility] evaluation per stroke protocol.     RECOMMENDATIONS   Diet Recommendations - Solids: Regular  Diet Recommendations - Liquids: Thin Liquids    Compensatory Strategies Recommended:  (reviewed standard swallowing precautions)  Medication Administration Recommendations: No restrictions  Treatment Plan/Recommendations: Communication evaluation (per protocol)    HISTORY   MEDICAL HISTORY  Reason for Referral: Stroke protocol    Problem List  Principal Problem:    Cerebellar stroke (HCC)  Active Problems:    Dizziness    Right hand paresthesia    Thrombus of aorta (HCC)    Bilateral pulmonary embolism (HCC)    Acute cystitis without hematuria      Past Medical History  Past Medical History:    Anxiety state    Cancer (HCC)    breast cancer 2018    Cerebellar stroke (HCC)    Chronic obstructive pulmonary disease, unspecified (HCC)    Chronic obstructive pulmonary disease, unspecified (HCC)    COPD (chronic obstructive pulmonary disease) (HCC)    Cystic thyroid nodule    Diabetes (HCC)    Diabetes mellitus (HCC)    Essential hypertension    Exposure to medical diagnostic radiation    High blood pressure    High cholesterol    History of blood transfusion    low hemoglobin    Osteoarthritis    PONV (postoperative nausea and vomiting)    Pulmonary embolism (HCC)    Pulmonary emphysema (HCC)    Rheumatoid arthritis (HCC)       Prior Living Situation:  (Home with dtr)  Diet Prior to Admission: Regular;Thin  liquids  Precautions: Aspiration    Patient/Family Goals: to be discharged    SWALLOWING HISTORY  Current Diet Consistency: Regular;Thin liquids    OBJECTIVE   ORAL MOTOR EXAMINATION  Dentition: Natural;Functional  Symmetry: Within Functional Limits  Strength: Within Functional Limits  Range of Motion: Within Functional Limits  Rate of Motion: Within Functional Limits    Voice Quality: Clear  Respiratory Status: Unlabored  Consistencies Trialed: Thin liquids;Hard solid  Method of Presentation: Self presentation;Cup;Straw  Patient Positioned: Upright;Midline    Oral Phase of Swallow: Within Functional Limits  Pharyngeal Phase of Swallow: Within Functional Limits     (Please note: Silent aspiration cannot be evaluated clinically. Videofluoroscopic Swallow Study is required to rule-out silent aspiration.)    FOLLOW UP  Treatment Plan/Recommendations: Communication evaluation (per protocol)  Duration: 1 week  Follow Up Needed (Documentation Required): Yes  SLP Follow-up Date: 01/20/25    Thank you for your referral.   If you have any questions, please contact   Misa Ricketts M.S. CCC/SLP  Speech-Language Pathologist  [facility]  #[phone]     20

## 2025-02-02 NOTE — ED ADULT NURSE NOTE - PAIN RATING/NUMBER SCALE (0-10): ACTIVITY
Take the Tamiflu as prescribed.  Take Robitussin as needed as directed for cough.  Take Motrin or Tylenol as needed as directed for fever.  Rest.  Drink plenty of fluids.  Call Marli Pete for follow-up appointment within 1 week.  Return to the emergency department if there is worsening shortness of breath, no urinary output in 6 to 8 hours, worsening way at all.   0

## 2025-03-17 NOTE — PATIENT PROFILE ADULT - NSASFUNCLEVELADLTOILET_GEN_A_NUR
Transitional Care Management Telephone Call Attempt    Discharge Date: 3/13/25  [facility]    Call Attempt Date: 3/17/2025  Call Attempt: Second  
0 = independent

## 2025-03-26 ENCOUNTER — APPOINTMENT (OUTPATIENT)
Dept: NEUROSURGERY | Facility: CLINIC | Age: 55
End: 2025-03-26
Payer: COMMERCIAL

## 2025-03-26 VITALS
DIASTOLIC BLOOD PRESSURE: 87 MMHG | HEART RATE: 66 BPM | WEIGHT: 196 LBS | OXYGEN SATURATION: 98 % | HEIGHT: 60 IN | BODY MASS INDEX: 38.48 KG/M2 | SYSTOLIC BLOOD PRESSURE: 129 MMHG

## 2025-03-26 DIAGNOSIS — M51.26 OTHER INTERVERTEBRAL DISC DISPLACEMENT, LUMBAR REGION: ICD-10-CM

## 2025-03-26 DIAGNOSIS — M54.16 RADICULOPATHY, LUMBAR REGION: ICD-10-CM

## 2025-03-26 PROCEDURE — 99212 OFFICE O/P EST SF 10 MIN: CPT
